# Patient Record
Sex: FEMALE | Race: OTHER | HISPANIC OR LATINO | Employment: FULL TIME | URBAN - METROPOLITAN AREA
[De-identification: names, ages, dates, MRNs, and addresses within clinical notes are randomized per-mention and may not be internally consistent; named-entity substitution may affect disease eponyms.]

---

## 2017-04-05 ENCOUNTER — ALLSCRIPTS OFFICE VISIT (OUTPATIENT)
Dept: OTHER | Facility: OTHER | Age: 32
End: 2017-04-05

## 2017-07-27 ENCOUNTER — ALLSCRIPTS OFFICE VISIT (OUTPATIENT)
Dept: OTHER | Facility: OTHER | Age: 32
End: 2017-07-27

## 2017-07-27 DIAGNOSIS — Z11.3 ENCOUNTER FOR SCREENING FOR INFECTIONS WITH PREDOMINANTLY SEXUAL MODE OF TRANSMISSION: ICD-10-CM

## 2017-08-08 ENCOUNTER — APPOINTMENT (OUTPATIENT)
Dept: LAB | Facility: CLINIC | Age: 32
End: 2017-08-08
Payer: COMMERCIAL

## 2017-08-08 ENCOUNTER — TRANSCRIBE ORDERS (OUTPATIENT)
Dept: LAB | Facility: CLINIC | Age: 32
End: 2017-08-08

## 2017-08-08 DIAGNOSIS — Z11.3 SCREENING EXAMINATION FOR VENEREAL DISEASE: ICD-10-CM

## 2017-08-08 DIAGNOSIS — Z11.3 SCREENING EXAMINATION FOR VENEREAL DISEASE: Primary | ICD-10-CM

## 2017-08-08 DIAGNOSIS — Z11.3 ENCOUNTER FOR SCREENING FOR INFECTIONS WITH PREDOMINANTLY SEXUAL MODE OF TRANSMISSION: ICD-10-CM

## 2017-08-08 LAB — HBV SURFACE AB SER-ACNC: 260.58 MIU/ML

## 2017-08-08 PROCEDURE — 36415 COLL VENOUS BLD VENIPUNCTURE: CPT

## 2017-08-08 PROCEDURE — 86694 HERPES SIMPLEX NES ANTBDY: CPT

## 2017-08-08 PROCEDURE — 87591 N.GONORRHOEAE DNA AMP PROB: CPT

## 2017-08-08 PROCEDURE — 86592 SYPHILIS TEST NON-TREP QUAL: CPT

## 2017-08-08 PROCEDURE — 87389 HIV-1 AG W/HIV-1&-2 AB AG IA: CPT

## 2017-08-08 PROCEDURE — 86706 HEP B SURFACE ANTIBODY: CPT

## 2017-08-08 PROCEDURE — 87491 CHLMYD TRACH DNA AMP PROBE: CPT

## 2017-08-09 LAB
CHLAMYDIA DNA CVX QL NAA+PROBE: ABNORMAL
HIV 1+2 AB+HIV1 P24 AG SERPL QL IA: NORMAL
HSV1+2 IGM SER IA-ACNC: <0.91 RATIO (ref 0–0.9)
N GONORRHOEA DNA GENITAL QL NAA+PROBE: ABNORMAL
RPR SER QL: NORMAL

## 2017-08-11 ENCOUNTER — GENERIC CONVERSION - ENCOUNTER (OUTPATIENT)
Dept: OTHER | Facility: OTHER | Age: 32
End: 2017-08-11

## 2018-01-11 NOTE — RESULT NOTES
Verified Results  (1) THIN PREP PAP WITH IMAGING [id number] 12:00AM Hilary Issa 350     Test Name Result Flag Reference   LAB AP CASE REPORT (Report)     Gynecologic Cytology Report            Case: [id number]                  Authorizing Provider: Leslie Feliciano MD    Collected:      08/22/2016           First Screen:     MICHAEL Kingsley    Received:      08/24/2016 1105        Rescreen:       MICHAEL Vivas                             Specimen:  LIQUID-BASED PAP, SCREENING, Endocervical   HPV HIGH RISK RESULT (Report)     HPV, High Risk: HPV NEG, HPV16 NEG, HPV18 NEG      Other High Risk HPV Negative, HPV 16 Negative, HPV 18 Negative  HPV types: 16,18,31,33,35,39,45,51,52,56,58,59,66 and 68 DNA are undetectable or below the pre-set threshold  Roche?s FDA approved Hiren 4800 is utilized with strict adherence to the ?s instruction  manual to test for the presence of High-Risk HPV DNA, as well as HPV 16 and HPV 18  This instrument  has been validated by our laboratory and/or by the   A negative result does not preclude the presence of HPV infection because results depend on adequate  specimen collection, absence of inhibitors and sufficient DNA to be detected  Additionally, HPV negative  results are not intended to prevent women from proceeding to colposcopy if clinically warranted  Positive HPV test results indicate the presence of any one or more of the high risk types, but since patients  are often co-infected with low-risk types it does not rule out the presence of low-risk types in patients  with mixed infections  LAB AP GYN PRIMARY INTERPRETATION      Negative for intraepithelial lesion or malignancy  Electronically signed by MICHAEL Vivas on 9/2/2016 at 11:19 AM   LAB AP GYN SPECIMEN ADEQUACY      Satisfactory for evaluation  Endocervical/transformation zone component present     LAB AP GYN ADDITIONAL INFORMATION (Report)     [employer] FDA approved ,  and ThinPrep Imaging System are   utilized with strict adherence to the 's instruction manual to   prepare gynecologic and non-gynecologic cytology specimens for the   production of ThinPrep slides as well as for gynecologic ThinPrep imaging  These processes have been validated by our laboratory and/or by the     The Pap test is not a diagnostic procedure and should not be used as the   sole means to detect cervical cancer  It is only a screening procedure to   aid in the detection of cervical cancer and its precursors  Both   false-negative and false-positive results have been experienced  Your   patient's test result should be interpreted in this context together with   the history and clinical findings         Signatures   Electronically signed by : JOCY Godoy ; Sep 12 2016  9:39AM EST                       (Author)

## 2018-01-11 NOTE — RESULT NOTES
Discussion/Summary   SUMMARY OF TEST RESULTS:      --POSITIVE chlamydia test   May or may not cause symptoms in women (vaginal burning, discharge), but as a precaution we should go ahead and treat with antibiotic (one time dose; Rx sent to pharmacy)  --Other tests negative  Hepatitis B test shows that you have immunity (presumably you were immunized in the past)  Let me know if you have any questions--Daniel      Verified Results  (1) CHLAMYDIA/GC AMPLIFIED DNA, PCR 08Aug2017 10:16AM Zeke Fury     Test Name Result Flag Reference   CHLAMYDIA,AMPLIFIED DNA PROBE  A C  trachomatis Amplified DNA Negative   C  trachomatis Amplified DNA POSITIVE   For optimal microbe detection, urine samples should be a first catch specimen (20-60 ml of urine)  Patient should not have urinated for at least 1 hour prior to collection  A specimen not collected in this manner may have falsely negative results  Calos Prado AMPLIFIED DNA   N  gonorrhoeae Amplified DNA Negative   N  gonorrhoeae Amplified DNA Negative     (1) RPR 60Rgj8223 10:15AM Zeke Fury    Order Number: [id number]     Test Name Result Flag Reference   RPR Non-Reactive  Non-Reactive     (1) HIV AG/AB Eulice Saas GEN [id number] 10:15AM Zeke Fury TW Order Number: [id number]     Test Name Result Flag Reference   HIV 1/2 AND P24 Non-Reactive  Non-Reactive   This test detects HIV 1, HIV2 and p24 Antigen       (1) HEP B SURFACE ANTIBODY 44Tdq3741 10:15AM Zeke Fury TW Order Number: [id number]     Test Name Result Flag Reference   HEPATITIS B SURFACE ANTIBODY 260 58 mIU/mL     Protective Immunity: Hep B Surface Antibody >= 10 mIu/ml (Traceable to [facility] Reference Preparation)     (1) HERPES I/II ANTIBODIES, IGM 97Pjr0036 10:15AM Zeke Fury     Test Name Result Flag Reference   HSVI/II COMB AB IGM <0 91 Ratio  0 00 - 0 90   Negative        <0 91                                   Equivocal 0 91 - 1 09 Positive        >1 09  Performed at:  [address]  [id number]  : Lauren Mendez MD, Phone:  [phone]       Plan  Chlamydia Trachomatis Urethritis    · Azithromycin 500 MG Oral Tablet; TAKE 2 TABLETS X 1   TAKE WITH FOOD

## 2018-01-13 VITALS
HEIGHT: 65 IN | HEART RATE: 85 BPM | BODY MASS INDEX: 30.82 KG/M2 | DIASTOLIC BLOOD PRESSURE: 70 MMHG | SYSTOLIC BLOOD PRESSURE: 128 MMHG | WEIGHT: 185 LBS | TEMPERATURE: 98.3 F | OXYGEN SATURATION: 98 %

## 2018-01-14 VITALS
HEIGHT: 65 IN | BODY MASS INDEX: 32.56 KG/M2 | DIASTOLIC BLOOD PRESSURE: 84 MMHG | SYSTOLIC BLOOD PRESSURE: 132 MMHG | WEIGHT: 195.44 LBS

## 2018-01-18 NOTE — PROGRESS NOTES
Assessment    1  Well adult exam (V70 0) (Z00 00)   2  Chronic migraine without aura without status migrainosus, not intractable (346 70)   (G43 709)   3  Carpal tunnel syndrome, bilateral (354 0) (G56 03)   4  Obesity (278 00) (E66 9)   5  Routine screening for STI (sexually transmitted infection) (V74 5) (Z11 3)   6  Bunion, right foot (727 1) (M21 611)   7  Mild depression (311) (F32 0)    Plan  Bunion, right foot    · 2 - Corey Giles DPM  (Podiatry) Co-Management  *  Status: Active  Requested for:  54ZKJ0949  Care Summary provided  : Yes  Routine screening for STI (sexually transmitted infection)    · (1) HEP B SURFACE ANTIBODY; Status:Active; Requested for:38Cgb8766;    · (1) HIV AG/AB COMBO, 4TH GEN; [Do Not Release]; Status:Active; Requested  for:67Acb5013;    · (1) RPR; Status:Active; Requested for:86Pqc3064;    · CHLAMYDIA/N  GONORRHEAE RNA; Status:Active; Requested for:15God2833;    · HSV 1/2 AB (IGM), IFA W/RFL TO TITER, SERUM; Status:Active; Requested  for:84Cjl6047;   Screening for depression    · *VB-Depression Screening; Status:Complete;   Done: 01CIW2631 06:56PM  Well adult exam    · Call [phone] if: You find a new or different kind of lump in your breast ;  Status:Complete;   Done: 97EIX0533   · Call [phone] if: You have any warning signs of skin cancer ; Status:Complete;    Done: 53OMZ9238   · Always use a seat belt and shoulder strap when riding or driving a motor vehicle ;  Status:Complete;   Done: 35WMZ6449   · Begin or continue regular aerobic exercise   Gradually work up to at least 3 sessions of 30  minutes of exercise a week ; Status:Complete;   Done: 69QQG2169   · Brush your teeth 3 times a day and floss at least once a day ; Status:Complete;   Done:  12TLM2384   · Decreasing the stress in your life may help your condition improve ; Status:Complete;    Done: 73NKR4695   · Limit your use of alcohol to 2 drinks or cans of beer a day ; Status:Complete;   Done:  69DIH4445   · Stretch and warm up your muscles during the first 10 minutes , then cool down your  muscles for the last 10 minutes of exercise ; Status:Complete;   Done: 99LCJ4133   · There are many ways to reduce your risk of catching or spreading a sexually transmitted  Infection ; Status:Complete;   Done: 48EXB7459   · Vitamins can help you get daily requirements that your diet may not be giving you ;  Status:Complete;   Done: 76TZA6212   · We recommend routine visits to a dentist ; Status:Complete;   Done: 05NQF0758   · We recommend that you bring your body mass index down to 26 ; Status:Complete;    Done: 69RGY8571   · We recommend that you follow the "Mediterranean diet "; Status:Complete;   Done:  75SLH9503   · You need to stop smoking  Though it is not easy, more than half of all adult smokers  have quit  We encourage you to write down all the reasons you should quit smoking and  set a quit date for yourself  Ask us how we can help  You may also call  [facility] for free resources and assistance ; Status:Complete;   Done:  06MRT0082    Discussion/Summary  health maintenance visit Currently, she eats an adequate diet and has an adequate exercise regimen  cervical cancer screening is managed by GYN Breast cancer screening: monthly self breast exam was advised and breast cancer screening is managed by GYN  Colorectal cancer screening: colorectal cancer screening is not indicated  Osteoporosis screening: bone mineral density testing is not indicated  The immunizations are up to date  Advice and education were given regarding nutrition, aerobic exercise and weight loss  Preventative + obesity: Applauded for her recent weight loss efforts  Encouraged to continue with this  Normal lipids, A1C, TSH in the past 2 years  UTD with GYN care  UTD with Tdap  Slips given for STI testing per patient request--asymptomatic  Migraines: Controlled on Topamax  Advil, Tylenol prn  Followed by neurology       Mild depression: Attributes to her recent separation from her   Moods improving  Good support system, declines need for additional intervention at this time  Regular exercise encouraged  Right bunion: Podiatry referral  Continued weight loss measures encouraged  Bilateral CTS (mild): Continue Advil, other conservative therapy  Call if becomes more bothersome-->ortho hand  RTO 1 year  Chief Complaint  Pt here for annual physical exam      History of Present Illness  HM, Adult Female: The patient is being seen for a health maintenance evaluation  The last health maintenance visit was 1 year(s) ago  Social History: Household members include 1 son(s), mother and father  She is   Work status: working full time  The patient has never smoked cigarettes  She reports rare alcohol use  She has never used illicit drugs  General Health: The patient's health since the last visit is described as good  She has regular dental visits  She denies vision problems  She denies hearing loss  Immunizations status: up to date  Lifestyle:  She consumes a diverse and healthy diet  She has weight concerns  She exercises regularly  She does not use tobacco  She denies alcohol use  She denies drug use  Reproductive health: the patient is premenopausal   she reports abnormal menses  Menstrual history: Menstrual Problems: dysmenorrhea  she is sexually active  Screening: cancer screening reviewed and updated  metabolic screening reviewed and updated  risk screening reviewed and updated  HPI:   Here for routine well exam      Migraines improved on Topamax  Once a week on average  Advil, Tylenol helps   from  2-3 months ago because he was cheating on her  Mildly depressed initially, but doing better now  Would like to get full STI testing done just in case  No symptoms  Currently living with her parents, 3year old son  Working full time at [employer]  Fairly physically demanding job   Ongoing mild CTS symptoms, both hands  Right great toe more prominent, painful over the past 2 months  No injury  On her feet a lot at work  Tries to wear comfortable shoes  Tries to eat healthy  Has cut down on her portion size  Tdap 2014 (at time of miscarriage)  Review of Systems    Constitutional: recent 10 lb weight loss, but no fever and not feeling tired  Eyes: no eyesight problems  ENT: no sore throat and no hearing loss  Cardiovascular: no palpitations  Respiratory: no shortness of breath and no cough  Gastrointestinal: no abdominal pain, no nausea, no vomiting, no constipation, no diarrhea and no blood in stools  Genitourinary: no dysuria and no incontinence  Musculoskeletal: as noted in HPI  Integumentary: no rashes  Neurological: as noted in HPI  Psychiatric: as noted in HPI and no sleep disturbances  Hematologic/Lymphatic: no swollen glands  Over the past 2 weeks, how often have you been bothered by the following problems? 1 ) Little interest or pleasure in doing things? Several days  2 ) Feeling down, depressed or hopeless? Several days  3 ) Trouble falling asleep or sleeping too much? Half the days or more  4 ) Feeling tired or having little energy? Nearly every day  5 ) Poor appetite or overeating? Half the days or more  6 ) Feeling bad about yourself, or that you are a failure, or have let yourself or your family down? Not at all    7 ) Trouble concentrating on things, such as reading a newspaper or watching television? Several days  8 ) Moving or speaking so slowly that other people could have noticed, or the opposite, moving or speaking faster than usual? Not at all    9 ) Thoughts that you would be better off dead or of hurting yourself in some way? Not at all  Score 10      Active Problems    1  Attempting to conceive (V49 89) (Z78 9)   2  History of Candidal intertrigo (112 3) (B37 2)   3  Carpal tunnel syndrome, bilateral (354 0) (G56 03)   4  Chlamydia Trachomatis Urethritis (099 41)   5  Chronic migraine without aura without status migrainosus, not intractable (346 70)   (G43 709)   6  Contraception (V25 9) (Z30 9)   7  Dyspareunia (625 0)   8  Female pelvic pain (625 9) (R10 2)   9  Flu vaccine need (V04 81) (Z23)   10  Laboratory examination ordered as part of a routine general medical examination    (V72 62) (Z00 00)   11  History of Mid back pain (724 5) (M54 9)   12  Obesity (278 00) (E66 9)   13  Oligomenorrhea (626 1) (N91 5)   14  Pregnancy with threatened  (640 00) (O20 0)   15  Screening examination for sexually transmitted disease (V74 5) (Z11 3)   16  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)   17  Spontaneous  (634 9)   18  Vaginal discharge (623 5) (N89 8)   19   Well female exam with routine gynecological exam (V72 31) (Z01 419)    Past Medical History    · History of Age At First Period 15 Years Old (Menarche)   · Bunion, right foot (727 1) (M21 611)   · History of Candidal intertrigo (112 3) (B37 2)   · History of vitamin D deficiency (V12 1) (Z86 39)   · Laboratory examination ordered as part of a routine general medical examination  (V72 62) (Z00 00)   · History of Mid back pain (724 5) (M54 9)   · Mild depression (311) (F32 0)   · Obesity (278 00) (E66 9)   · History of Previous Spontaneous Vaginal Delivery   · Routine screening for STI (sexually transmitted infection) (V74 5) (Z11 3)   · History of Urinary Tract Infection (V13 02)   · Well adult exam (V70 0) (Z00 00)    Surgical History    · Denied: History Of Prior Surgery    Family History  Father    · Family history of gout (V18 19) (Z82 69)   · Family history of hypertension (V17 49) (Z82 49)  Brother    · Family history of Leukemia (V16 6)  Maternal Grandfather    · Family history of diabetes mellitus (V18 0) (Z83 3)  Family History    · Family history of Alzheimer's disease (V17 2) (Z82 0)   · Family history of anemia (V18 2) (Z83 2)   · Family history of migraine headaches (V17 2) (Z82 0)   · Family history of Vitamin D deficiency    Social History    · Denied: History of Alcohol Use (History)   · Attempting to conceive (V49 89) (Z78 9)   · Denied: History of Drug Use   ·    · Never A Smoker   · One child   · Unemployed (V62 0) (Z56 0)    Current Meds   1  Advil TABS; PRN; Therapy: (Recorded:05Apr2017) to Recorded   2  B-6 50 MG Oral Tablet; TAKE 1 TABLET DAILY; Therapy: 57KGQ5490 to (Evaluate:63Yvy5902)  Requested for: 45ITO8836; Last   Rx:16Nov2016 Ordered   3  Cyproheptadine HCl - 4 MG Oral Tablet; TAKE 1 TABLET AT BEDTIME; Therapy: 60VHH0484 to (Evaluate:11Nov2017)  Requested for: 48BCE7758; Last   Rx:16Nov2016 Ordered   4  Topiramate 25 MG Oral Tablet; Take 1 tab at bedtime for 1 week then 2 tabs at bedtime   for 1 week then 3 tabs at bedtime for 1 week and then 4 tabs at bedtime; Therapy: 77FZZ0302 to (Evaluate:61Mcv1367)  Requested for: 05Apr2017; Last   Rx:05Apr2017 Ordered   5  Tylenol CAPS; PRN; Therapy: (Recorded:05Apr2017) to Recorded    Allergies    1  Milk-related Compounds    Vitals   Recorded: 89Cdx8788 06:50PM   Temperature 98 3 F, Tympanic   Heart Rate 85   Systolic 848   Diastolic 70   Height 5 ft 5 in   Weight 185 lb    BMI Calculated 30 79   BSA Calculated 1 91   O2 Saturation 98     Physical Exam    Constitutional   General appearance: No acute distress, well appearing and well nourished  Head and Face   Head and face: Normal     Eyes   Conjunctiva and lids: No swelling, erythema or discharge  Pupils and irises: Equal, round, reactive to light  Ears, Nose, Mouth, and Throat   External inspection of ears and nose: Normal     Otoscopic examination: Tympanic membranes translucent with normal light reflex  Canals patent without erythema  Nasal mucosa, septum, and turbinates: Normal without edema or erythema  Lips, teeth, and gums: Normal, good dentition      Oropharynx: Normal with no erythema, edema, exudate or lesions  Neck   Neck: Supple, symmetric, trachea midline, no masses  Thyroid: Normal, no thyromegaly  Pulmonary   Respiratory effort: No increased work of breathing or signs of respiratory distress  Auscultation of lungs: Clear to auscultation  Cardiovascular   Auscultation of heart: Normal rate and rhythm, normal S1 and S2, no murmurs  Abdomen   Abdomen: Non-tender, no masses  Liver and spleen: No hepatomegaly or splenomegaly  Lymphatic   Palpation of lymph nodes in neck: No lymphadenopathy  Musculoskeletal   Gait and station: Normal     Joints, bones, and muscles: Abnormal   Mild medial prominence + tenderness of right 1st MTP joint  No erythema, swelling  Muscle strength/tone: Normal     Skin   Skin and subcutaneous tissue: Normal without rashes or lesions  Neurologic   Reflexes: 2+ and symmetric  Psychiatric   Orientation to person, place, and time: Normal     Mood and affect: Normal        Results/Data  *VB-Depression Screening 19Dxs6748 06:56PM Cameron Prisca     Test Name Result Flag Reference   Depression Scale Result      Depression Screen - Positive Findings       Health Management  Well female exam with routine gynecological exam   Health Maintenance Flow Sheet; every 5 years; Last 06ZNV6226; Next Due: 30KCZ0785;   Overdue    Future Appointments    Date/Time Provider Specialty Site   08/17/2017 09:30 AM Ana Maria Galdamez MD Neurology  [address] [facility]     Signatures   Electronically signed by : Faizan Araiza, [address] [place] [address]; Jul 27 2017  7:48PM EST                       (Author)    Electronically signed by : JOCY Hercules ; Jul 28 2017 11:40AM EST

## 2018-04-13 RX ORDER — CYPROHEPTADINE HYDROCHLORIDE 4 MG/1
1 TABLET ORAL
COMMUNITY
Start: 2016-11-16 | End: 2018-04-17 | Stop reason: ALTCHOICE

## 2018-04-13 RX ORDER — TOPIRAMATE 25 MG/1
TABLET ORAL
COMMUNITY
Start: 2017-04-05 | End: 2018-04-17 | Stop reason: ALTCHOICE

## 2018-04-13 RX ORDER — IBUPROFEN 200 MG
TABLET ORAL AS NEEDED
COMMUNITY
End: 2018-08-21

## 2018-04-13 RX ORDER — PYRIDOXINE HCL (VITAMIN B6) 50 MG
1 TABLET ORAL DAILY
COMMUNITY
Start: 2016-11-16 | End: 2018-08-21 | Stop reason: ALTCHOICE

## 2018-04-17 ENCOUNTER — OFFICE VISIT (OUTPATIENT)
Dept: NEUROLOGY | Facility: CLINIC | Age: 33
End: 2018-04-17
Payer: COMMERCIAL

## 2018-04-17 VITALS
DIASTOLIC BLOOD PRESSURE: 100 MMHG | SYSTOLIC BLOOD PRESSURE: 160 MMHG | WEIGHT: 193 LBS | HEART RATE: 88 BPM | BODY MASS INDEX: 32.15 KG/M2 | HEIGHT: 65 IN

## 2018-04-17 DIAGNOSIS — G43.711 INTRACTABLE CHRONIC MIGRAINE WITHOUT AURA AND WITH STATUS MIGRAINOSUS: Primary | ICD-10-CM

## 2018-04-17 PROCEDURE — 99214 OFFICE O/P EST MOD 30 MIN: CPT | Performed by: PSYCHIATRY & NEUROLOGY

## 2018-04-17 PROCEDURE — 96372 THER/PROPH/DIAG INJ SC/IM: CPT | Performed by: PSYCHIATRY & NEUROLOGY

## 2018-04-17 RX ORDER — PROCHLORPERAZINE MALEATE 10 MG
TABLET ORAL
Qty: 10 TABLET | Refills: 0 | Status: SHIPPED | OUTPATIENT
Start: 2018-04-17 | End: 2018-08-21 | Stop reason: ALTCHOICE

## 2018-04-17 RX ORDER — KETOROLAC TROMETHAMINE 30 MG/ML
60 INJECTION, SOLUTION INTRAMUSCULAR; INTRAVENOUS ONCE
Status: COMPLETED | OUTPATIENT
Start: 2018-04-17 | End: 2018-04-17

## 2018-04-17 RX ORDER — KETOROLAC TROMETHAMINE 10 MG/1
TABLET, FILM COATED ORAL
Qty: 10 TABLET | Refills: 0 | Status: SHIPPED | OUTPATIENT
Start: 2018-04-17 | End: 2018-08-21 | Stop reason: ALTCHOICE

## 2018-04-17 RX ORDER — PROPRANOLOL HYDROCHLORIDE 20 MG/1
TABLET ORAL
Qty: 60 TABLET | Refills: 3 | Status: SHIPPED | OUTPATIENT
Start: 2018-04-17 | End: 2018-08-21 | Stop reason: ALTCHOICE

## 2018-04-17 RX ADMIN — KETOROLAC TROMETHAMINE 60 MG: 30 INJECTION, SOLUTION INTRAMUSCULAR; INTRAVENOUS at 14:20

## 2018-04-17 NOTE — PROGRESS NOTES
Patient ID: Lee Knox is a 28 y o  female  Assessment/Plan:   Problem List Items Addressed This Visit        Cardiovascular and Mediastinum    Intractable chronic migraine without aura and with status migrainosus - Primary    Relevant Medications    ibuprofen (ADVIL) 200 mg tablet    Acetaminophen (TYLENOL) 325 MG CAPS    propranolol (INDERAL) 20 mg tablet    ketorolac (TORADOL) 10 mg tablet    prochlorperazine (COMPAZINE) 10 mg tablet    ketorolac (TORADOL) 60 mg/2 mL IM injection 60 mg (Start on 4/17/2018  2:00 PM)    prochlorperazine edisylate (COMPAZINE) injection 10 mg (Start on 4/17/2018  2:00 PM)            preventive therapy for migraine headaches:  -  I will start her on propanolol 20 mg twice a day   abortive therapy for migraine headaches  -  At the onset of a migraine headache she is to take Toradol  ( ketorolac ) 10 mg  And Compazine (  prochlorperazine) 10 mg      hyper pressure was elevated today  We will see if blood pressure continues to stay elevated with propanolol  We will see patient back in 2 months    Subjective:  HPI  We had the pleasure of evaluating Lee Knox in neurological follow-up today  As you know she is a 32year old right handed female  She has been working at an [employer]  Carpal tunnel syndrome:   - At her last appt she had been experiencing paresthesias in the bilat hands in the median nerve distribution for the past 2 weeks  They would awaken her at night and occur when she is using her phone or driving  She started B6 and the symptoms resolved  Chronic Migraine headaches:   PREVENTIVE: magnesium, B2, cyproheptadine  ABORTIVE: Advil, Tylenol    - Headaches are worse if the patient: putting hair in a tight band  - Headache trigger: stress, Weather change - heat    Aura: None     current headache is 7-8/10  How often do the headaches occur - 2 to 3 times a week  This week she had almost daily headaches    She says that they were really severe headaches  She states that even taking Motrin was not helping  She also noted that with this she had severe neck pain  Which started on the right side and then involved the whole neck  What time of the day do the headaches start - can happen at any time  How long do the headaches last - a few hours  Where are they located - bilateral temporal, frontal, occipital, top of head  What is the intensity of pain - 3-5/10  Describe your usual headache - throbbing, pulsing, achy, stabbing, pressure  Associated symptoms: photophobia, blurred vision, phonophobia, sensitive to smells, Problem with concentration, lacrimation, prefer to be alone and in a dark room    Reviewed ROS  The following portions of the patient's history were reviewed and updated as appropriate: allergies, current medications, past family history, past medical history, past social history, past surgical history and problem list        Objective:    Blood pressure 160/100, pulse 88, height 5' 5" (1 651 m), weight 87 5 kg (193 lb)  Physical Exam   Constitutional: She appears well-developed  Eyes: EOM are normal  Pupils are equal, round, and reactive to light  Neck: Normal range of motion  Neck supple  Cardiovascular: Normal rate  Pulmonary/Chest: Effort normal    Abdominal: Soft  Musculoskeletal: Normal range of motion  Neurological: She has normal strength and normal reflexes  Gait and coordination normal    Skin: Skin is warm  Psychiatric: She has a normal mood and affect  Her speech is normal        Neurological Exam    Mental Status  The patient is alert  Her speech is normal  Her language is fluent with no aphasia  She has normal attention span and concentration       Cranial Nerves    CN II: The patient's visual acuity and visual fields are normal   CN III, IV, VI: The patient's pupils are equally round and reactive to light and ocular movements are normal   CN V: The patient has normal facial sensation  CN VII:  The patient has symmetric facial movement  CN VIII:  The patient's hearing is normal   CN IX, X: The patient has symmetric palate movement and normal gag reflex  CN XI: The patient's shoulder shrug strength is normal   CN XII: The patient's tongue is midline without atrophy or fasciculations  Motor  The patient has normal muscle bulk throughout  Her overall muscle tone is normal throughout  Her strength is 5/5 throughout all four extremities  Sensory  The patient's sensation is normal in all four extremities  Reflexes  Deep tendon reflexes are 2+ and symmetric in all four extremities with downgoing toes bilaterally  Gait and Coordination  The patient has normal gait and station and normal casual, toe, heel, and tandem gait  She has normal coordination bilaterally  ROS:  Review of Systems   Constitutional: Negative  HENT: Positive for ear pain  Eyes: Positive for photophobia and pain  Respiratory: Negative  Cardiovascular: Negative  Gastrointestinal: Negative  Endocrine: Negative  Genitourinary: Negative  Musculoskeletal: Positive for neck pain  Skin: Negative  Allergic/Immunologic: Negative  Neurological: Positive for dizziness, light-headedness and headaches  Hematological: Negative  Psychiatric/Behavioral: Positive for sleep disturbance

## 2018-05-31 NOTE — PROGRESS NOTES
Patient ID: Adi Steward is a 28 y o  female  Assessment/Plan:    Paresthesia  We will proceed with EMG testing on your upper extremities to evaluate your symptoms  Call us if anything worsens    Chronic migraine without aura without status migrainosus, not intractable  Preventative:  Magnesium 400 mg a day  Vitamin B2 (riboflavin) 400 mg a day  Continue propranolol twice a day    Abortive: At onset of migraine, take rizatriptan  May repeat in 2 hours if needed  Limit of 3/week or 9/month  Limit Tylenol and Ibuprofen to less than 3 times a week         Problem List Items Addressed This Visit        Cardiovascular and Mediastinum    Chronic migraine without aura without status migrainosus, not intractable - Primary     Preventative:  Magnesium 400 mg a day  Vitamin B2 (riboflavin) 400 mg a day  Continue propranolol twice a day    Abortive: At onset of migraine, take rizatriptan  May repeat in 2 hours if needed  Limit of 3/week or 9/month  Limit Tylenol and Ibuprofen to less than 3 times a week         Relevant Medications    rizatriptan (MAXALT) 10 MG tablet       Other    Paresthesia     We will proceed with EMG testing on your upper extremities to evaluate your symptoms  Call us if anything worsens         Relevant Orders    EMG 2 Limb Upper Extremity             Subjective:    HPI  We had the pleasure of evaluating Adi Steward in neurological follow-up today  As you know she is a 32year old right handed female  She has been working at an [employer]          Carpal tunnel syndrome:   - At her last appt she had been experiencing paresthesias in the bilat hands in the median nerve distribution for the past 2 weeks  They would awaken her at night and occur when she is using her phone or driving  Was better but now worse  Complains of numbness in thumb, and first 2 fingers    Also has numbness in both hands after sleeping and sometimes even just with arm extended     Chronic Migraine headaches: PREVENTIVE: magnesium, B2, cyproheptadine, Indural  ABORTIVE: Advil, Tylenol  Headaches are worse if the patient: putting hair in a tight band  Headache trigger: stress, Weather change - heat     Aura: None  current headache is 2/10  How often do the headaches occur - 2 times a week  What time of the day do the headaches start - can happen at any time  How long do the headaches last - a few hours (3-4 hours), occasionally can last into next day  Where are they located - bilateral temporal, frontal, occipital, top of head  What is the intensity of pain - 6-10/10 (average 5-6/10)  Describe your usual headache - throbbing, pulsing, achy, stabbing, pressure  Associated symptoms: photophobia, blurred vision, phonophobia, sensitive to smells, Problem with concentration, lacrimation, prefer to be alone and in a dark room  Not currently pregnant, however may have more children in future     The following portions of the patient's history were reviewed and updated as appropriate: allergies, current medications, past family history, past medical history, past social history, past surgical history and problem list          Objective:    Blood pressure 146/90, pulse 74, height 5' 5" (1 651 m), weight 89 kg (196 lb 1 6 oz)  Physical Exam   Constitutional: She appears well-developed and well-nourished  HENT:   Head: Normocephalic and atraumatic  Eyes: EOM are normal  Pupils are equal, round, and reactive to light  Neck: Normal range of motion  Cardiovascular: Normal rate  Pulmonary/Chest: Effort normal    Musculoskeletal: Normal range of motion  Neurological: She has normal strength and normal reflexes  Gait and coordination normal    Skin: Skin is warm and dry  Psychiatric: She has a normal mood and affect  Her speech is normal    Nursing note and vitals reviewed  Neurological Exam    Mental Status  The patient is alert and oriented to person, place, time, and situation   Her recent and remote memory are normal  She has no visuospatial neglect  Her speech is normal  Her language is fluent with no aphasia  She has normal attention span and concentration  She has a normal fund of knowledge  Cranial Nerves    CN II: The patient's visual acuity and visual fields are normal   CN III, IV, VI: The patient's pupils are equally round and reactive to light and ocular movements are normal   CN V: The patient has normal facial sensation  CN VII:  The patient has symmetric facial movement  CN VIII:  The patient's hearing is normal   CN IX, X: The patient has symmetric palate movement and normal gag reflex  CN XI: The patient's shoulder shrug strength is normal   CN XII: The patient's tongue is midline without atrophy or fasciculations  Motor  The patient has normal muscle bulk throughout  Her overall muscle tone is normal throughout  Her strength is 5/5 throughout all four extremities  Sensory  The patient's sensation is normal in all four extremities  She has normal cortical sensation    Reflexes  Deep tendon reflexes are 2+ and symmetric in all four extremities with downgoing toes bilaterally  Gait and Coordination  The patient has normal gait and station and normal casual, toe, heel, and tandem gait  She has normal coordination bilaterally  ROS:    Review of Systems   Constitutional: Positive for chills and fatigue  HENT: Negative  Eyes: Negative  Respiratory: Negative  Cardiovascular: Negative  Gastrointestinal: Negative  Endocrine: Negative  Genitourinary: Negative  Musculoskeletal: Positive for neck pain  Skin: Negative  Allergic/Immunologic: Negative  Neurological: Positive for dizziness, light-headedness, numbness (b/l hands ) and headaches  Hematological: Negative  Psychiatric/Behavioral: Positive for sleep disturbance

## 2018-06-05 ENCOUNTER — OFFICE VISIT (OUTPATIENT)
Dept: NEUROLOGY | Facility: CLINIC | Age: 33
End: 2018-06-05
Payer: COMMERCIAL

## 2018-06-05 ENCOUNTER — OFFICE VISIT (OUTPATIENT)
Dept: FAMILY MEDICINE CLINIC | Facility: OTHER | Age: 33
End: 2018-06-05
Payer: COMMERCIAL

## 2018-06-05 VITALS
SYSTOLIC BLOOD PRESSURE: 146 MMHG | HEART RATE: 74 BPM | DIASTOLIC BLOOD PRESSURE: 90 MMHG | BODY MASS INDEX: 32.67 KG/M2 | HEIGHT: 65 IN | WEIGHT: 196.1 LBS

## 2018-06-05 VITALS
WEIGHT: 195.1 LBS | HEART RATE: 80 BPM | HEIGHT: 66 IN | TEMPERATURE: 98.1 F | BODY MASS INDEX: 31.36 KG/M2 | DIASTOLIC BLOOD PRESSURE: 96 MMHG | SYSTOLIC BLOOD PRESSURE: 152 MMHG | OXYGEN SATURATION: 99 %

## 2018-06-05 DIAGNOSIS — I10 ESSENTIAL HYPERTENSION: Primary | ICD-10-CM

## 2018-06-05 DIAGNOSIS — R20.2 PARESTHESIA: ICD-10-CM

## 2018-06-05 DIAGNOSIS — E66.9 CLASS 1 OBESITY WITH BODY MASS INDEX (BMI) OF 31.0 TO 31.9 IN ADULT, UNSPECIFIED OBESITY TYPE, UNSPECIFIED WHETHER SERIOUS COMORBIDITY PRESENT: ICD-10-CM

## 2018-06-05 DIAGNOSIS — B37.0 ORAL CANDIDIASIS: ICD-10-CM

## 2018-06-05 DIAGNOSIS — G43.709 CHRONIC MIGRAINE WITHOUT AURA WITHOUT STATUS MIGRAINOSUS, NOT INTRACTABLE: Primary | ICD-10-CM

## 2018-06-05 DIAGNOSIS — G43.709 CHRONIC MIGRAINE WITHOUT AURA WITHOUT STATUS MIGRAINOSUS, NOT INTRACTABLE: ICD-10-CM

## 2018-06-05 DIAGNOSIS — G47.30 SLEEP-DISORDERED BREATHING: ICD-10-CM

## 2018-06-05 DIAGNOSIS — R35.89 POLYURIA: ICD-10-CM

## 2018-06-05 DIAGNOSIS — G56.03 CARPAL TUNNEL SYNDROME, BILATERAL: ICD-10-CM

## 2018-06-05 PROCEDURE — 99214 OFFICE O/P EST MOD 30 MIN: CPT | Performed by: PHYSICIAN ASSISTANT

## 2018-06-05 PROCEDURE — 99214 OFFICE O/P EST MOD 30 MIN: CPT | Performed by: NURSE PRACTITIONER

## 2018-06-05 RX ORDER — RIZATRIPTAN BENZOATE 10 MG/1
10 TABLET ORAL ONCE AS NEEDED
Qty: 9 TABLET | Refills: 0 | Status: SHIPPED | OUTPATIENT
Start: 2018-06-05 | End: 2018-08-21 | Stop reason: ALTCHOICE

## 2018-06-05 NOTE — PATIENT INSTRUCTIONS
Preventative:  Magnesium 400 mg a day  Vitamin B2 (riboflavin) 400 mg a day  Continue propranolol twice a day    Abortive: At onset of migraine, take rizatriptan  May repeat in 2 hours if needed  Limit of 3/week or 9/month  Limit Tylenol and Ibuprofen to less than 3 times a week    We will proceed with EMG testing on your upper extremities to evaluate your symptoms  Call us if anything worsens    May take these over-the-counter supplements to decrease intensity and frequency of migraines  - Magnesium Oxide 400-500 mg a day  If any diarrhea or upset stomach, decrease dose  as tolerated  -  B2 200 mg a day  May take once a day in am  This supplement will change the color of the urine to fluorescent yellow no matter how hydrated, which is normal      Discussed side effects of all medications prescribed today to the patient in detail  Patient education was completed today and we also discussed precautions for rebound headaches  When patient has a moderate to severe headache, they should seek rest, initiate relaxation and apply cold compresses to the head  1  Maintain regular sleep schedule  Adults need at least 7-8 hours of uninterrupted a night  2  Limit over the counter medications such as Tylenol, Ibuprofen, Aleve, Excedrin  (No more than 3 times a week)  3  Maintain headache diary  We discussed an VLADIMIR for a smart phone is "Migraine eDiary"  4  Limit caffeine to 1-2 cups a day or less  5  Avoid dietary trigger  (list given to the patient and reviewed with them)  6  Patient is to have regular frequent meals to prevent headache onset  7   Please drink at least 64 ounces of water a day to help remain hydrated

## 2018-06-05 NOTE — ASSESSMENT & PLAN NOTE
We will proceed with EMG testing on your upper extremities to evaluate your symptoms  Call us if anything worsens

## 2018-06-05 NOTE — ASSESSMENT & PLAN NOTE
Preventative:  Magnesium 400 mg a day  Vitamin B2 (riboflavin) 400 mg a day  Continue propranolol twice a day    Abortive: At onset of migraine, take rizatriptan  May repeat in 2 hours if needed    Limit of 3/week or 9/month  Limit Tylenol and Ibuprofen to less than 3 times a week

## 2018-06-05 NOTE — PATIENT INSTRUCTIONS

## 2018-06-05 NOTE — PROGRESS NOTES
Assessment/Plan:         Problem List Items Addressed This Visit     Essential hypertension   --Elevated, despite daily compliance with B-blocker, which was recently prescribed by neurologist for her migraines  Potential contributing factors including obesity, stress level, regular NSAID use, possible ANNMARIE  --No recent labs-->will perform secondary workup including sleep study, per below  --Given female of childbearing age, will avoid additional medication including ACEI/ARBs for now  --Weight loss, dietary interventions, home BP readings, stress relieving measures encouraged   Other Relevant Orders   CBC and differential   Comprehensive metabolic panel   HEMOGLOBIN A1C W/ EAG ESTIMATION   Lipid Panel with Direct LDL reflex   TSH, 3rd generation with T4 reflex   Aldosterone/Renin Ratio   Chronic migraine without aura without status migrainosus, not intractable  --Followed by  neurology  --Remains on B-blocker + abortive therapy prn (NSAID, Compazine)  Avoid overuse of NSAIDs  Carpal tunnel syndrome, bilateral    Other Relevant Orders    Ambulatory referral to Physical Therapy    Obesity  --Continued weight loss measures encouraged      Sleep-disordered breathing  --Weight loss measres    Other Relevant Orders    Diagnostic Sleep Study    Polyuria    Other Relevant Orders    Urinalysis with reflex to microscopic    Osmolality    Diagnostic Sleep Study    Osmolality, urine  Hgb A1C      Other Visit Diagnoses     Oral candidiasis        Relevant Medications    nystatin (MYCOSTATIN) 100,000 units/mL suspension            RTO 1 month  Call for new/worsening symptoms in the interim    Subjective:      Patient ID: Sami Teague is a 28 y o  female  Here because of recent elevated blood pressure readings at neurologist, most recently this morning  This is despite taking B-blocker (propranolol) twice a day, as prescribed, since April  Started by neurologist for her migraines    She states she has not missed any doses  May be helping a bit with her headaches  Frequency down to twice a week on average  Takes OTC NSAID often also, most recently two days ago (four [address])  Rates headache 3/10 at present  No associated N/V lately  Occasional dizziness  No vision changes, photophobia, CP, palpitations  BP was previously (last year) normal without medication  No recent dietary changes, other reasons for her blood pressure to be high that she can think of, although admits to ongoing high stress level, mainly attributed to warehouse job at [employer]  Frequent urination the past 2-3 months  3-4 times a night  Every 2 hours during the day also  No polydipsia, but tends to drink a lot (non-caffeinated, no ETOH)  Urine dark, however  No dysuria, odor  No recent labs  Ongoing bilateral CTS  Needs extensive disability form completed  Was told by neurologist that PCP needs to fill out  Not currently undergoing PT  Fairly healthy diet  Regular fruits and vegetables  No added salt  Unrefreshed sleep most days, despite adequate hours in bed  Frequent night time wakening (either to urinate or for no particular reason)  Son states she snores loudly, but not aware of any apnea  No nighttime choking episodes  Headaches sometimes present when she wakes in morning  Tingling, burning sensation in mouth (tongue primarily) x 2-3 weeks  No recent change in mouthwash, toothpaste  No alcohol  1 cup of coffee per day  Father with hypertension  The following portions of the patient's history were reviewed and updated as appropriate: allergies, current medications, past family history, past medical history, past social history, past surgical history and problem list     Review of Systems   Constitutional: Positive for fatigue  Negative for fever and unexpected weight change  HENT: Negative for sore throat and trouble swallowing  Eyes: Negative for visual disturbance  Respiratory: Negative for shortness of breath  Cardiovascular: Negative for chest pain and palpitations  Gastrointestinal: Negative for abdominal pain, diarrhea, nausea and vomiting  Genitourinary: Positive for frequency  Negative for dysuria  Musculoskeletal: Negative for arthralgias  Neurological: Positive for dizziness and headaches  Hematological: Negative for adenopathy  Psychiatric/Behavioral:        Per HPI         Objective:      /98 (BP Location: Left arm, Patient Position: Sitting, Cuff Size: Adult)   Pulse 80   Temp 98 1 °F (36 7 °C) (Tympanic)   Ht 5' 5 5" (1 664 m)   Wt 88 5 kg (195 lb 1 6 oz)   SpO2 99%   BMI 31 97 kg/m²          Physical Exam   Constitutional: She is oriented to person, place, and time  She appears well-developed and well-nourished  HENT:   Head: Normocephalic  Right Ear: External ear normal    Left Ear: External ear normal    Nose: Nose normal    Posterior half of tongue, dorsal aspect, with adherent white exudate  Remainder of oropharynx WNL  Eyes: Conjunctivae are normal  Pupils are equal, round, and reactive to light  Neck: Normal range of motion  Neck supple  No thyromegaly present  Cardiovascular: Normal rate, regular rhythm and normal heart sounds  Pulmonary/Chest: Effort normal and breath sounds normal    Abdominal: Soft  Bowel sounds are normal  There is no tenderness  Musculoskeletal: She exhibits no edema  Lymphadenopathy:     She has no cervical adenopathy  Neurological: She is alert and oriented to person, place, and time  She has normal reflexes  She displays normal reflexes  Skin: Skin is warm and dry  Psychiatric: She has a normal mood and affect   Her behavior is normal  Thought content normal

## 2018-06-12 ENCOUNTER — TELEPHONE (OUTPATIENT)
Dept: FAMILY MEDICINE CLINIC | Facility: OTHER | Age: 33
End: 2018-06-12

## 2018-06-12 ENCOUNTER — TRANSCRIBE ORDERS (OUTPATIENT)
Dept: LAB | Facility: CLINIC | Age: 33
End: 2018-06-12

## 2018-06-12 ENCOUNTER — APPOINTMENT (OUTPATIENT)
Dept: LAB | Facility: CLINIC | Age: 33
End: 2018-06-12
Payer: COMMERCIAL

## 2018-06-12 DIAGNOSIS — R35.89 POLYURIA: Primary | ICD-10-CM

## 2018-06-12 DIAGNOSIS — I10 ESSENTIAL HYPERTENSION: ICD-10-CM

## 2018-06-12 DIAGNOSIS — R35.89 POLYURIA: ICD-10-CM

## 2018-06-12 LAB
ALBUMIN SERPL BCP-MCNC: 3.5 G/DL (ref 3.5–5)
ALP SERPL-CCNC: 81 U/L (ref 46–116)
ALT SERPL W P-5'-P-CCNC: 18 U/L (ref 12–78)
ANION GAP SERPL CALCULATED.3IONS-SCNC: 8 MMOL/L (ref 4–13)
AST SERPL W P-5'-P-CCNC: 16 U/L (ref 5–45)
BACTERIA UR QL AUTO: ABNORMAL /HPF
BASOPHILS # BLD AUTO: 0.02 THOUSANDS/ΜL (ref 0–0.1)
BASOPHILS NFR BLD AUTO: 0 % (ref 0–1)
BILIRUB SERPL-MCNC: 0.6 MG/DL (ref 0.2–1)
BILIRUB UR QL STRIP: NEGATIVE
BUN SERPL-MCNC: 7 MG/DL (ref 5–25)
CALCIUM SERPL-MCNC: 8.2 MG/DL (ref 8.3–10.1)
CHLORIDE SERPL-SCNC: 104 MMOL/L (ref 100–108)
CHOLEST SERPL-MCNC: 164 MG/DL (ref 50–200)
CLARITY UR: CLEAR
CO2 SERPL-SCNC: 26 MMOL/L (ref 21–32)
COLOR UR: YELLOW
CREAT SERPL-MCNC: 0.62 MG/DL (ref 0.6–1.3)
EOSINOPHIL # BLD AUTO: 0.18 THOUSAND/ΜL (ref 0–0.61)
EOSINOPHIL NFR BLD AUTO: 2 % (ref 0–6)
ERYTHROCYTE [DISTWIDTH] IN BLOOD BY AUTOMATED COUNT: 12 % (ref 11.6–15.1)
EST. AVERAGE GLUCOSE BLD GHB EST-MCNC: 100 MG/DL
GFR SERPL CREATININE-BSD FRML MDRD: 120 ML/MIN/1.73SQ M
GLUCOSE P FAST SERPL-MCNC: 89 MG/DL (ref 65–99)
GLUCOSE UR STRIP-MCNC: NEGATIVE MG/DL
HBA1C MFR BLD: 5.1 % (ref 4.2–6.3)
HCT VFR BLD AUTO: 43.4 % (ref 34.8–46.1)
HDLC SERPL-MCNC: 53 MG/DL (ref 40–60)
HGB BLD-MCNC: 14.9 G/DL (ref 11.5–15.4)
HGB UR QL STRIP.AUTO: ABNORMAL
KETONES UR STRIP-MCNC: NEGATIVE MG/DL
LDLC SERPL CALC-MCNC: 85 MG/DL (ref 0–100)
LEUKOCYTE ESTERASE UR QL STRIP: ABNORMAL
LYMPHOCYTES # BLD AUTO: 2.04 THOUSANDS/ΜL (ref 0.6–4.47)
LYMPHOCYTES NFR BLD AUTO: 25 % (ref 14–44)
MCH RBC QN AUTO: 28.3 PG (ref 26.8–34.3)
MCHC RBC AUTO-ENTMCNC: 34.3 G/DL (ref 31.4–37.4)
MCV RBC AUTO: 82 FL (ref 82–98)
MONOCYTES # BLD AUTO: 0.27 THOUSAND/ΜL (ref 0.17–1.22)
MONOCYTES NFR BLD AUTO: 3 % (ref 4–12)
NEUTROPHILS # BLD AUTO: 5.79 THOUSANDS/ΜL (ref 1.85–7.62)
NEUTS SEG NFR BLD AUTO: 70 % (ref 43–75)
NITRITE UR QL STRIP: NEGATIVE
NON-SQ EPI CELLS URNS QL MICRO: ABNORMAL /HPF
OSMOLALITY UR/SERPL-RTO: 284 MMOL/KG (ref 282–298)
OSMOLALITY UR: 679 MMOL/KG
PH UR STRIP.AUTO: 7 [PH] (ref 4.5–8)
PLATELET # BLD AUTO: 263 THOUSANDS/UL (ref 149–390)
PMV BLD AUTO: 9.7 FL (ref 8.9–12.7)
POTASSIUM SERPL-SCNC: 3.7 MMOL/L (ref 3.5–5.3)
PROT SERPL-MCNC: 7.7 G/DL (ref 6.4–8.2)
PROT UR STRIP-MCNC: NEGATIVE MG/DL
RBC # BLD AUTO: 5.27 MILLION/UL (ref 3.81–5.12)
RBC #/AREA URNS AUTO: ABNORMAL /HPF
SODIUM SERPL-SCNC: 138 MMOL/L (ref 136–145)
SP GR UR STRIP.AUTO: 1.01 (ref 1–1.03)
TRIGL SERPL-MCNC: 131 MG/DL
TSH SERPL DL<=0.05 MIU/L-ACNC: 1.33 UIU/ML (ref 0.36–3.74)
UROBILINOGEN UR QL STRIP.AUTO: 0.2 E.U./DL
WBC # BLD AUTO: 8.3 THOUSAND/UL (ref 4.31–10.16)
WBC #/AREA URNS AUTO: ABNORMAL /HPF

## 2018-06-12 PROCEDURE — 80053 COMPREHEN METABOLIC PANEL: CPT

## 2018-06-12 PROCEDURE — 82088 ASSAY OF ALDOSTERONE: CPT

## 2018-06-12 PROCEDURE — 84244 ASSAY OF RENIN: CPT

## 2018-06-12 PROCEDURE — 83935 ASSAY OF URINE OSMOLALITY: CPT | Performed by: NURSE PRACTITIONER

## 2018-06-12 PROCEDURE — 85025 COMPLETE CBC W/AUTO DIFF WBC: CPT

## 2018-06-12 PROCEDURE — 83036 HEMOGLOBIN GLYCOSYLATED A1C: CPT

## 2018-06-12 PROCEDURE — 81001 URINALYSIS AUTO W/SCOPE: CPT

## 2018-06-12 PROCEDURE — 84443 ASSAY THYROID STIM HORMONE: CPT

## 2018-06-12 PROCEDURE — 36415 COLL VENOUS BLD VENIPUNCTURE: CPT

## 2018-06-12 PROCEDURE — 83930 ASSAY OF BLOOD OSMOLALITY: CPT

## 2018-06-12 PROCEDURE — 80061 LIPID PANEL: CPT

## 2018-06-12 NOTE — TELEPHONE ENCOUNTER
I left message for to call back     ----- Message from Ana Client, Marie Barreto sent at 6/12/2018  2:11 PM EDT -----  Can we let Alyssa Saba know that her blood work results came back normal including blood sugar, thyroid, and very good cholesterol numbers    Thanks

## 2018-06-14 LAB — ALDOST SERPL-MCNC: 7.1 NG/DL (ref 0–30)

## 2018-06-19 LAB — RENIN PLAS-CCNC: 2.17 NG/ML/HR (ref 0.17–5.38)

## 2018-07-03 ENCOUNTER — TELEPHONE (OUTPATIENT)
Dept: NEUROLOGY | Facility: CLINIC | Age: 33
End: 2018-07-03

## 2018-07-09 ENCOUNTER — EVALUATION (OUTPATIENT)
Dept: OCCUPATIONAL THERAPY | Facility: CLINIC | Age: 33
End: 2018-07-09
Payer: COMMERCIAL

## 2018-07-09 DIAGNOSIS — G56.03 CARPAL TUNNEL SYNDROME, BILATERAL: ICD-10-CM

## 2018-07-09 DIAGNOSIS — G56.03 CARPAL TUNNEL SYNDROME ON BOTH SIDES: Primary | ICD-10-CM

## 2018-07-09 PROCEDURE — G8985 CARRY GOAL STATUS: HCPCS | Performed by: OCCUPATIONAL THERAPIST

## 2018-07-09 PROCEDURE — 97165 OT EVAL LOW COMPLEX 30 MIN: CPT | Performed by: OCCUPATIONAL THERAPIST

## 2018-07-09 PROCEDURE — G8984 CARRY CURRENT STATUS: HCPCS | Performed by: OCCUPATIONAL THERAPIST

## 2018-07-09 NOTE — PROGRESS NOTES
OT Evaluation     Today's date: 2018  Patient name: Tushar Dejesus  : 1985  MRN: [id number]  Referring provider: LITA Prasad  Dx:   Encounter Diagnosis     ICD-10-CM    1  Carpal tunnel syndrome on both sides G56 03        Start Time: 1630  Stop Time: 1715  Total time in clinic (min): 45 minutes    Assessment  Impairments: impaired physical strength and pain with function    Assessment details: 34 yo female presents to OT with complaints of pain in her  B hands  MD diagnosed with Bilateral CTS  [place] normal on B hands  Negative  tinel at wrist and elbows B  She notes she does have electrical shocks at wrist at times  Medium nerve glides increased symptoms of numbness and tingling  She has one right wrist splint but doesn't wear  She was requested to bring in to assess for proper fit  Physically, she presents with decreased strength and increased pain  Functionally, her job is repetitive over 10 hours and elicits symptoms of numbness and pain  She reports that she has nocturnal awakening and her entire hand can become numb  Izzy Sinks She would benefit from skilled OT to remediate symptoms of pain, numbness and decreased strength as well as splint management  Understanding of Dx/Px/POC: good   Prognosis: good    Goals  STG:  In 3 weeks  1  Decreased pain to 2-3/10 when performing work/IADL tasks for periods of 2 to 4 hours  2   Patient will be compliant with hs splint schedule for right hand  3   Assess need for left splint  4   I with HEP  LTG 6 weeks  1  Patient will have decreased pain 0-2 after a 10 hour work day  2   Increase FOTO by 10 points  3   Sufficient strength for a 40 hour work week  3   Assess need for left splint  3   Improve FOTO score 10%    LTG's 8 weeks  1    Patient will report minimal to no pain with all activities  2   Sufficient strength to perform ADL's and IADL's        Plan  Patient would benefit from: custom splinting, orthotics, OT eval and skilled occupational therapy  Planned modality interventions: ultrasound, thermotherapy: paraffin bath, thermotherapy: hydrocollator packs and cryotherapy  Planned therapy interventions: joint mobilization, neuromuscular re-education, patient education, body mechanics training, strengthening, stretching, therapeutic activities, therapeutic exercise, home exercise program, graded exercise, graded activity, functional ROM exercises, flexibility and orthotic fitting/training  Frequency: 2x week  Duration in weeks: 6        Subjective Evaluation    History of Present Illness  Date of onset: 2017  Mechanism of injury: At work I lift heavy stuff every day doing the same movement  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  She works 10 hour shifts and will perform the same task for the entire 10 hours  Has been employed at this job for 2 years /40 hour work week  Patient reports the symptoms have worsened over the past year  Recurrent probem    Pain  Current pain ratin  At best pain ratin  At worst pain ratin  Location: Pain eminates on dorsum of hand    Quality: sharp  Relieving factors: rest  Aggravating factors: lifting  Progression: worsening    Social Support  Lives in: multiple-level home    Employment status: working  Hand dominance: right      Diagnostic Tests  No diagnostic tests performed  Abnormal EMG results: scheduled in the first week in August   Treatments  Previous treatment: medication  Patient Goals  Patient goals for therapy: decreased pain  Patient goal:  " I need my hands back with no pain"        Objective     Active Range of Motion     Left Wrist   Wrist flexion: 65 degrees   Wrist extension: 55 degrees   Radial deviation: 15 degrees   Ulnar deviation: 30 degrees     Right Wrist   Wrist flexion: 72 degrees   Wrist extension: 60 degrees   Radial deviation: 20 degrees   Ulnar deviation: 30 degrees     Strength/Myotome Testing     Left Wrist/Hand   Normal wrist strength     (2nd hand position)     Trial 1: 45    Trial 2: 30    Trial 3: 26    Thumb Strength  Key/Lateral Pinch     Trail 1: 5 5  Tip/Two-Point Pinch     Trial 1: 2  Palmar/Three-Point Pinch     Trial 1: 4    Right Wrist/Hand   Normal wrist strength     (2nd hand position)     Trial 1: 29    Trial 2: 28    Trial 3: 30    Thumb Strength   Key/Lateral Pinch     Trial 1: 9 5  Tip/Two-Point Pinch     Trial 1: 2  Palmar/Three-Point Pinch     Trial 1: 4    Tests     Additional Tests Details  - tinel at wrist and elbow bilateral  + [place]in on left with tenderness noted in the 1 st dorsal compartment  [place] 2 83 bilateral all digits          Flowsheet Rows      Most Recent Value   PT/OT G-Codes   Current Score  56   Projected Score  66   FOTO information reviewed  Yes   Assessment Type  Evaluation   G code set  Carrying, Moving & Handling Objects   Carrying, Moving and Handling Objects Current Status ()  CK   Carrying, Moving and Handling Objects Goal Status ()  CJ          Precautions: none    Daily Treatment Diary     Manual  7/9            IASTM                                                                     Exercise Diary              TGE HEP            MNG HEP                                                                                                                                                                                                                                                          Modalities                           CP

## 2018-07-10 ENCOUNTER — TELEPHONE (OUTPATIENT)
Dept: SLEEP CENTER | Facility: CLINIC | Age: 33
End: 2018-07-10

## 2018-07-10 NOTE — TELEPHONE ENCOUNTER
----- Message from Deborah Orozco MD sent at 7/10/2018  8:55 AM EDT -----  Regarding: RE: sleep study approval  Approved  ----- Message -----  From: Fredy Blow: 7/6/2018  11:50 AM  To: [facility] [place], #  Subject: sleep study approval                             Please review for approval or denial   Facundo [email or web address]/5/18 note

## 2018-07-16 ENCOUNTER — OFFICE VISIT (OUTPATIENT)
Dept: OCCUPATIONAL THERAPY | Facility: CLINIC | Age: 33
End: 2018-07-16
Payer: COMMERCIAL

## 2018-07-16 DIAGNOSIS — G56.03 CARPAL TUNNEL SYNDROME ON BOTH SIDES: Primary | ICD-10-CM

## 2018-07-16 PROCEDURE — 97140 MANUAL THERAPY 1/> REGIONS: CPT | Performed by: OCCUPATIONAL THERAPIST

## 2018-07-16 NOTE — PROGRESS NOTES
OT Evaluation     Today's date: 2018  Patient name: Jem Duff  : 1985  MRN: [id number]  Referring provider: LITA Trujillo  Dx:   Encounter Diagnosis     ICD-10-CM    1  Carpal tunnel syndrome on both sides G56 03                   Assessment  Impairments: impaired physical strength and pain with function    Assessment details: 34 yo female presents to OT with complaints of pain in her  B hands  MD diagnosed with Bilateral CTS  [place] normal on B hands  Negative  tinel at wrist and elbows B  She notes she does have electrical shocks at wrist at times  Medium nerve glides increased symptoms of numbness and tingling  She has one right wrist splint but doesn't wear  She was requested to bring in to assess for proper fit  Physically, she presents with decreased strength and increased pain  Functionally, her job is repetitive over 10 hours and elicits symptoms of numbness and pain  She reports that she has nocturnal awakening and her entire hand can become numb  Miguel Ángel Bishop She would benefit from skilled OT to remediate symptoms of pain, numbness and decreased strength as well as splint management  Understanding of Dx/Px/POC: good   Prognosis: good    Goals  STG:  In 3 weeks  1  Decreased pain to 2-3/10 when performing work/IADL tasks for periods of 2 to 4 hours  2   Patient will be compliant with hs splint schedule for right hand  3   Assess need for left splint  4   I with HEP  LTG 6 weeks  1  Patient will have decreased pain 0-2 after a 10 hour work day  2   Increase FOTO by 10 points  3   Sufficient strength for a 40 hour work week  3   Assess need for left splint  3   Improve FOTO score 10%    LTG's 8 weeks  1    Patient will report minimal to no pain with all activities  2   Sufficient strength to perform ADL's and IADL's        Plan  Patient would benefit from: custom splinting, orthotics, OT eval and skilled occupational therapy  Planned modality interventions: ultrasound, thermotherapy: paraffin bath, thermotherapy: hydrocollator packs and cryotherapy  Planned therapy interventions: joint mobilization, neuromuscular re-education, patient education, body mechanics training, strengthening, stretching, therapeutic activities, therapeutic exercise, home exercise program, graded exercise, graded activity, functional ROM exercises, flexibility and orthotic fitting/training  Frequency: 2x week  Duration in weeks: 6        Subjective Evaluation    History of Present Illness  Date of onset: 2017  Mechanism of injury: At work I lift heavy stuff every day doing the same movement  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  She works 10 hour shifts and will perform the same task for the entire 10 hours  Has been employed at this job for 2 years /40 hour work week  Patient reports the symptoms have worsened over the past year  Recurrent probem    Pain  Current pain ratin  At best pain ratin  At worst pain ratin  Location: Pain eminates on dorsum of hand    Quality: sharp  Relieving factors: rest  Aggravating factors: lifting  Progression: worsening    Social Support  Lives in: multiple-level home    Employment status: working  Hand dominance: right      Diagnostic Tests  No diagnostic tests performed  Abnormal EMG results: scheduled in the first week in August   Treatments  Previous treatment: medication  Patient Goals  Patient goals for therapy: decreased pain  Patient goal:  " I need my hands back with no pain"        Objective     Active Range of Motion     Left Wrist   Wrist flexion: 65 degrees   Wrist extension: 55 degrees   Radial deviation: 15 degrees   Ulnar deviation: 30 degrees     Right Wrist   Wrist flexion: 72 degrees   Wrist extension: 60 degrees   Radial deviation: 20 degrees   Ulnar deviation: 30 degrees     Strength/Myotome Testing     Left Wrist/Hand   Normal wrist strength     (2nd hand position)     Trial 1: 45    Trial 2: 30 Trial 3: 26    Thumb Strength  Key/Lateral Pinch     Trail 1: 5 5  Tip/Two-Point Pinch     Trial 1: 2  Palmar/Three-Point Pinch     Trial 1: 4    Right Wrist/Hand   Normal wrist strength     (2nd hand position)     Trial 1: 29    Trial 2: 28    Trial 3: 30    Thumb Strength   Key/Lateral Pinch     Trial 1: 9 5  Tip/Two-Point Pinch     Trial 1: 2  Palmar/Three-Point Pinch     Trial 1: 4    Tests     Additional Tests Details  - tinel at wrist and elbow bilateral  + finklestein on left with tenderness noted in the 1 st dorsal compartment  [place] 2 83 bilateral all digits              Precautions: none    Daily Treatment Diary     Manual  7/9            IASTM                                                                     Exercise Diary              TGE HEP            MNG HEP                                                                                                                                                                                                                                                          Modalities                           CP

## 2018-07-16 NOTE — PROGRESS NOTES
Daily Note     Today's date: 2018  Patient name: Damir Delong  : 1985  MRN: [id number]  Referring provider: LITA Aguilar  Dx:   Encounter Diagnosis     ICD-10-CM    1  Carpal tunnel syndrome on both sides G56 03                   Subjective: "I do the same thing for 8 hours a day"      Objective: See treatment diary below      Assessment:  R sided MN tension >L  Numbness during MNG with medium excursion  Also demonstrating some EDC irritation  Tolerated treatment well  Patient would benefit from continued OT  Plan: Progress treatment as tolerated        Precautions: Universal     Daily Treatment Diary      Manual                     IASTM                       Supine B/L MNG    10'                                                                                                 Exercise Diary                        TGE HEP                     MNG HEP                     Self MNG    3x B/L                   Keypegs   1x B/L                   Wrist strength   3# 2x10                                                                                                                                                                                                                                                                                                                                                                                                 Modalities                        MH  10'                     CP

## 2018-07-19 ENCOUNTER — APPOINTMENT (OUTPATIENT)
Dept: OCCUPATIONAL THERAPY | Facility: CLINIC | Age: 33
End: 2018-07-19
Payer: COMMERCIAL

## 2018-07-23 ENCOUNTER — APPOINTMENT (OUTPATIENT)
Dept: OCCUPATIONAL THERAPY | Facility: CLINIC | Age: 33
End: 2018-07-23
Payer: COMMERCIAL

## 2018-07-24 ENCOUNTER — OFFICE VISIT (OUTPATIENT)
Dept: OCCUPATIONAL THERAPY | Facility: CLINIC | Age: 33
End: 2018-07-24
Payer: COMMERCIAL

## 2018-07-24 DIAGNOSIS — G56.03 CARPAL TUNNEL SYNDROME ON BOTH SIDES: Primary | ICD-10-CM

## 2018-07-24 PROCEDURE — 97140 MANUAL THERAPY 1/> REGIONS: CPT | Performed by: OCCUPATIONAL THERAPIST

## 2018-07-24 PROCEDURE — 97110 THERAPEUTIC EXERCISES: CPT | Performed by: OCCUPATIONAL THERAPIST

## 2018-07-24 NOTE — PROGRESS NOTES
Daily Note     Today's date: 2018  Patient name: Tone Martínez  : 1985  MRN: [id number]  Referring provider: LITA Meyers  Dx:   No diagnosis found  Subjective:       Objective: See treatment diary below      Assessment:  Median nerve tension greatest at last 30 degrees EE  R>L  Numbness persists during gliding in the RUE  Tolerated treatment fair  Patient would benefit from continued OT  Plan: Progress treatment as tolerated        Precautions: Universal     Daily Treatment Diary      Manual                   IASTM                       Supine B/L MNG    10'  15'                                                                                               Exercise Diary                      TGE HEP                     MNG HEP                     Self MNG    3x B/L  2x B/L                 Keypegs   1x B/L  1x B/L                 Wrist strength   3# 2x10  3# 2x10                                                                                                                                                                                                                                                                                                                                                                                               Modalities                        10'  10'                   CP

## 2018-07-26 ENCOUNTER — OFFICE VISIT (OUTPATIENT)
Dept: OCCUPATIONAL THERAPY | Facility: CLINIC | Age: 33
End: 2018-07-26
Payer: COMMERCIAL

## 2018-07-26 DIAGNOSIS — G56.03 CARPAL TUNNEL SYNDROME ON BOTH SIDES: Primary | ICD-10-CM

## 2018-07-26 PROCEDURE — 97110 THERAPEUTIC EXERCISES: CPT | Performed by: OCCUPATIONAL THERAPIST

## 2018-07-26 PROCEDURE — 97140 MANUAL THERAPY 1/> REGIONS: CPT | Performed by: OCCUPATIONAL THERAPIST

## 2018-07-26 PROCEDURE — 97010 HOT OR COLD PACKS THERAPY: CPT | Performed by: OCCUPATIONAL THERAPIST

## 2018-07-26 NOTE — PROGRESS NOTES
Daily Note     Today's date: 2018  Patient name: Justino Baca  : 1985  MRN: [id number]  Referring provider: LITA Abdi  Dx:   Encounter Diagnosis     ICD-10-CM    1  Carpal tunnel syndrome on both sides G56 03                   Subjective:       Objective: See treatment diary below      Assessment:  Continued neural tension, however reports some improvement  Trialing kinesiotape R  Tolerated treatment fair  Patient would benefit from continued OT  Plan: Progress treatment as tolerated        Precautions: Universal     Daily Treatment Diary      Manual                 IASTM                       Supine B/L MNG    10'  15'  15                                                                                             Exercise Diary                    TGE HEP                     MNG HEP                     Self MNG    3x B/L  2x B/L                 Keypegs   1x B/L  1x B/L  1x B               Wrist strength   3# 2x10  3# 2x10  3# 2x10                EDC/ABp       Y gum x 30               Opposition roll       Green ball x 12                                                                                                                                                                                                                                                                                                                                             Modalities                    MH  10'  10'  10                 CP

## 2018-07-30 ENCOUNTER — APPOINTMENT (OUTPATIENT)
Dept: OCCUPATIONAL THERAPY | Facility: CLINIC | Age: 33
End: 2018-07-30
Payer: COMMERCIAL

## 2018-08-02 ENCOUNTER — APPOINTMENT (OUTPATIENT)
Dept: OCCUPATIONAL THERAPY | Facility: CLINIC | Age: 33
End: 2018-08-02
Payer: COMMERCIAL

## 2018-08-06 ENCOUNTER — OFFICE VISIT (OUTPATIENT)
Dept: OCCUPATIONAL THERAPY | Facility: CLINIC | Age: 33
End: 2018-08-06
Payer: COMMERCIAL

## 2018-08-06 DIAGNOSIS — G56.03 CARPAL TUNNEL SYNDROME ON BOTH SIDES: Primary | ICD-10-CM

## 2018-08-06 PROCEDURE — 97140 MANUAL THERAPY 1/> REGIONS: CPT | Performed by: OCCUPATIONAL THERAPIST

## 2018-08-06 PROCEDURE — 97112 NEUROMUSCULAR REEDUCATION: CPT | Performed by: OCCUPATIONAL THERAPIST

## 2018-08-06 PROCEDURE — 97110 THERAPEUTIC EXERCISES: CPT | Performed by: OCCUPATIONAL THERAPIST

## 2018-08-06 PROCEDURE — 97010 HOT OR COLD PACKS THERAPY: CPT | Performed by: OCCUPATIONAL THERAPIST

## 2018-08-06 NOTE — PROGRESS NOTES
Pt did not return for follow up visits  Daily Note     Today's date: 2018  Patient name: Elif Doctor  : 1985  MRN: [id number]  Referring provider: LITA Leon  Dx:   Encounter Diagnosis     ICD-10-CM    1  Carpal tunnel syndrome on both sides G56 03                   Subjective: "I guess it's a little better"      Objective: See treatment diary below      Assessment: Focusing on  Proximal decompression/posture training  EMG tomorrow, continued neural tension and pain  Plan: Progress treatment as tolerated        Precautions: Universal     Daily Treatment Diary      Manual    8             IASTM                       Supine B/L MNG    10'  15'  15  10                                                                                           Exercise Diary       86             TGE HEP                     MNG HEP                     Self MNG    3x B/L  2x B/L                 Keypegs   1x B/L  1x B/L  1x B  x1 B             Wrist strength   3# 2x10  3# 2x10  3# 2x10                EDC/ABp       Y gum x 30  Y gum x 30 B             Opposition roll       Green ball x 12  Green nx12 B               Scap adduction U/M/L         RTB 3x10                                                                                                                                                                                                                                                                                                                   Modalities     8/6               MH  10'  10'  10  10               CP

## 2018-08-07 ENCOUNTER — OFFICE VISIT (OUTPATIENT)
Dept: FAMILY MEDICINE CLINIC | Facility: OTHER | Age: 33
End: 2018-08-07
Payer: COMMERCIAL

## 2018-08-07 ENCOUNTER — HOSPITAL ENCOUNTER (OUTPATIENT)
Dept: RADIOLOGY | Facility: CLINIC | Age: 33
Discharge: HOME | End: 2018-08-07
Admitting: PHYSICAL MEDICINE & REHABILITATION
Payer: COMMERCIAL

## 2018-08-07 VITALS
SYSTOLIC BLOOD PRESSURE: 132 MMHG | HEART RATE: 87 BPM | TEMPERATURE: 98.7 F | WEIGHT: 196.8 LBS | BODY MASS INDEX: 32.25 KG/M2 | DIASTOLIC BLOOD PRESSURE: 76 MMHG | OXYGEN SATURATION: 98 %

## 2018-08-07 DIAGNOSIS — G43.709 CHRONIC MIGRAINE WITHOUT AURA WITHOUT STATUS MIGRAINOSUS, NOT INTRACTABLE: Primary | ICD-10-CM

## 2018-08-07 DIAGNOSIS — R20.2 PARESTHESIA: ICD-10-CM

## 2018-08-07 DIAGNOSIS — I10 ESSENTIAL HYPERTENSION: ICD-10-CM

## 2018-08-07 DIAGNOSIS — Z3A.01 LESS THAN 8 WEEKS GESTATION OF PREGNANCY: ICD-10-CM

## 2018-08-07 PROCEDURE — 99214 OFFICE O/P EST MOD 30 MIN: CPT | Performed by: NURSE PRACTITIONER

## 2018-08-07 PROCEDURE — 95909 NRV CNDJ TST 5-6 STUDIES: CPT | Performed by: PHYSICAL MEDICINE & REHABILITATION

## 2018-08-07 PROCEDURE — 95885 MUSC TST DONE W/NERV TST LIM: CPT

## 2018-08-07 PROCEDURE — 95885 MUSC TST DONE W/NERV TST LIM: CPT | Performed by: PHYSICAL MEDICINE & REHABILITATION

## 2018-08-07 NOTE — PROCEDURES
Procedures      Electromyogram and Nerve Conduction Velocity Procedure Note    HX:   24-year-old right-hand-dominant female with paresthesias and discomfort in both hands right greater than left  No history of trauma but she does  Notice symptoms increased with gripping and grasping activities as well as sometimes nocturnally  She denies any associated neck or shoulder pain  PMH:     Migraine headaches  Exam:      Reflex, motor, and sensory exam are entirely normal   There are no long tract signs, no fasciculations, tremors, or atrophy are noted  Positive Tinel sign over both median nerves  Procedure:  Verbal informed consent was obtained as with all electrodiagnostic medicine patients  As with all patients this patient was informed that they may terminate the  examine at any time  Patient tolerated the procedure well with no adverse effects reported or observed  Findings:  Please see the [employer] data printout  There is mild relative slowing of the median sensory fibers across the carpal ligament segment on the right side  Otherwise nerve conduction velocities were entirely normal     Electromyography: Monopolar needle exploration failed to reveal any abnormal spontaneous potentials the following muscles : Bilateral cervical paraspinals bilateral deltoid, bilateral triceps, bilateral brachioradialis, right abductor pollicis brevis and left 1st dorsal interosseous  In all muscles motor units were normal for amplitude, duration, and configuration, and recruitment pattern normal as well  Conclusion:     1  There is evidence for only mild median mononeuropathy at the right wrist with supports clinical diagnosis of a mild "carpal tunnel syndrome  Median nerve function is otherwise entirely normal on the right and normal on the left      2   There is no electrophysiologic data in the nerves and muscles tested today to indicate or suggest a cervical radiculopathy, plexopathy, or large fiber polyneuropathy  Recommendations:        careful clinical correlation is advised

## 2018-08-07 NOTE — PROGRESS NOTES
Assessment/Plan:         Problem List Items Addressed This Visit     Chronic migraine without aura without status migrainosus, not intractable - Primary    Essential hypertension    Less than 8 weeks gestation of pregnancy  --Referred to OB per below  --Has stopped all Rx/OTC meds except occasional Tylenol  Reminded not to take NSAIDs during pregnancy  OTC loratadine prn OK for dog allergies  --Taking prenatal vitamin  --BP close to normal today off B-blocker  Monitor at home and during pregnancy, notifying us if > 140/90 or if LE edema develops  --Sleep study scheduled for next month  Relevant Orders    Ambulatory referral to Obstetrics / Gynecology            Subjective:      Patient ID: Corky Severin is a 35 y o  female  Here for follow-up to blood pressure  No home readings since last visit  Stopped propranolol last week because she just found out she was pregnant  No AE's from abrupt discontinuance  Stopped all other meds as well  Positive home pregnancy test x 2  No menses since 8/2  No spotting, abdominal pain  Mild nausea when brushing teeth only  Started prenatal MVI  No recent migraines  Recent normal labs  Remains un refreshed in the morning  Sleep study scheduled next month  Gets allergies when she is around dogs (runny nose, watery/itchy eyes)  Wondering what is safe to take  The following portions of the patient's history were reviewed and updated as appropriate: allergies, current medications, past family history, past medical history, past social history, past surgical history and problem list     Review of Systems   Respiratory: Negative for shortness of breath  Gastrointestinal: Negative for abdominal pain  Neurological: Negative for dizziness and headaches           Objective:      /76 (BP Location: Left arm, Patient Position: Sitting, Cuff Size: Standard)   Pulse 87   Temp 98 7 °F (37 1 °C) (Tympanic)   Wt 89 3 kg (196 lb 12 8 oz)   SpO2 98%   BMI 32 25 kg/m²          Physical Exam   Constitutional: She is oriented to person, place, and time  She appears well-developed  HENT:   Mouth/Throat: Oropharynx is clear and moist    Cardiovascular: Normal rate and normal heart sounds  Pulmonary/Chest: Effort normal and breath sounds normal    Musculoskeletal: She exhibits no edema  Neurological: She is alert and oriented to person, place, and time  Psychiatric: She has a normal mood and affect

## 2018-08-09 ENCOUNTER — APPOINTMENT (OUTPATIENT)
Dept: OCCUPATIONAL THERAPY | Facility: CLINIC | Age: 33
End: 2018-08-09
Payer: COMMERCIAL

## 2018-08-13 ENCOUNTER — APPOINTMENT (OUTPATIENT)
Dept: OCCUPATIONAL THERAPY | Facility: CLINIC | Age: 33
End: 2018-08-13
Payer: COMMERCIAL

## 2018-08-16 ENCOUNTER — APPOINTMENT (OUTPATIENT)
Dept: OCCUPATIONAL THERAPY | Facility: CLINIC | Age: 33
End: 2018-08-16
Payer: COMMERCIAL

## 2018-08-21 ENCOUNTER — OFFICE VISIT (OUTPATIENT)
Dept: FAMILY MEDICINE CLINIC | Facility: CLINIC | Age: 33
End: 2018-08-21
Payer: COMMERCIAL

## 2018-08-21 VITALS
RESPIRATION RATE: 16 BRPM | BODY MASS INDEX: 31.79 KG/M2 | OXYGEN SATURATION: 98 % | TEMPERATURE: 98.2 F | HEART RATE: 79 BPM | DIASTOLIC BLOOD PRESSURE: 80 MMHG | HEIGHT: 66 IN | WEIGHT: 197.8 LBS | SYSTOLIC BLOOD PRESSURE: 130 MMHG

## 2018-08-21 DIAGNOSIS — Z3A.01 LESS THAN 8 WEEKS GESTATION OF PREGNANCY: ICD-10-CM

## 2018-08-21 DIAGNOSIS — O26.851 SPOTTING AFFECTING PREGNANCY IN FIRST TRIMESTER: ICD-10-CM

## 2018-08-21 DIAGNOSIS — I10 ESSENTIAL HYPERTENSION: Primary | ICD-10-CM

## 2018-08-21 PROBLEM — F32.A MILD DEPRESSION: Status: ACTIVE | Noted: 2017-07-27

## 2018-08-21 PROBLEM — R20.2 PARESTHESIA: Status: RESOLVED | Noted: 2018-06-05 | Resolved: 2018-08-21

## 2018-08-21 PROBLEM — F32.A MILD DEPRESSION: Status: RESOLVED | Noted: 2017-07-27 | Resolved: 2018-08-21

## 2018-08-21 PROBLEM — R35.89 POLYURIA: Status: RESOLVED | Noted: 2018-06-05 | Resolved: 2018-08-21

## 2018-08-21 PROBLEM — G43.711 INTRACTABLE CHRONIC MIGRAINE WITHOUT AURA AND WITH STATUS MIGRAINOSUS: Status: RESOLVED | Noted: 2018-04-17 | Resolved: 2018-08-21

## 2018-08-21 LAB — SL AMB POCT URINE HCG: POSITIVE

## 2018-08-21 PROCEDURE — 3079F DIAST BP 80-89 MM HG: CPT | Performed by: NURSE PRACTITIONER

## 2018-08-21 PROCEDURE — 81025 URINE PREGNANCY TEST: CPT | Performed by: NURSE PRACTITIONER

## 2018-08-21 PROCEDURE — 99214 OFFICE O/P EST MOD 30 MIN: CPT | Performed by: NURSE PRACTITIONER

## 2018-08-21 PROCEDURE — 3008F BODY MASS INDEX DOCD: CPT | Performed by: NURSE PRACTITIONER

## 2018-08-21 PROCEDURE — 3075F SYST BP GE 130 - 139MM HG: CPT | Performed by: NURSE PRACTITIONER

## 2018-08-21 RX ORDER — LANOLIN ALCOHOL/MO/W.PET/CERES
400 CREAM (GRAM) TOPICAL DAILY
COMMUNITY
End: 2021-08-09 | Stop reason: ALTCHOICE

## 2018-08-21 NOTE — PROGRESS NOTES
Assessment/Plan:    No problem-specific Assessment & Plan notes found for this encounter  Diagnoses and all orders for this visit:    Essential hypertension    Less than 8 weeks gestation of pregnancy    Spotting affecting pregnancy in first trimester  -     POCT urine HCG    Other orders  -     Cancel: POCT urine HCG  -     folic acid (FOLVITE) 545 mcg tablet; Take 400 mcg by mouth daily        Patient given literature about approved over-the-counter medications during pregnancy  Urine HCG remains positive status post 3 hours of spotting last week  Patient has gyn appointment coming up next week  Patient encouraged to keep Neurology appointment  Patient advised to RTO -recheck blood pressure in 3 months - may need to start methyldopa or metoprolol for blood pressure management during preg  Obtain flu shot when avail  Subjective:   Chief Complaint   Patient presents with   [address]   Patient says she took 3 pregnancy test and they all were positive   PHQ 2 done        Patient ID: Myrna Lara is a 35 y o  female  HPI  Patient is a transfer from another [address] [place] [address] primary care provider  She is approximately 8 weeks pregnant - last mense 18 - [place]   3 para 1 with 1 spontaneous miscarriage at 11 weeks -back in   Patient reports having a gyn visit coming up next week  Patient also reports being on folic acid/prenatals  Patient reports had 3 hours of spotting last week bright red blood no clots no cramps  Urine POC for HCG is still positive  Patient reports nausea in the morning and tender breasts; no vomiting  Past medical history: significant for hypertension and chronic migraine without aura  She is off topiramate/propranolol  BP is stable 130/ 80s at this time  + bilateral carpal tunnel in which she has been treating with occupational therapy and bracing      Patient does follow with Neurology and sleep specialist     Lab review done: Lipid profile June of 2018 164, triglycerides 131, HDL 53, LDL 85; hemoglobin A1c 5 1; CMP normal; CBC normal    PHQ-9 Depression Screening    PHQ-9:    Frequency of the following problems over the past two weeks:       Little interest or pleasure in doing things:  0 - not at all  Feeling down, depressed, or hopeless:  0 - not at all  PHQ-2 Score:  0           The following portions of the patient's history were reviewed and updated as appropriate: allergies, past social history, past surgical history and problem list     Review of Systems   Constitutional: Negative for activity change and appetite change  HENT: Negative  Eyes: Negative  Respiratory: Negative  Cardiovascular: Negative  Negative for chest pain  Gastrointestinal: Positive for nausea  Endocrine: Negative  Negative for cold intolerance  Genitourinary: Negative  See hpi   Musculoskeletal: Negative  Skin: Negative  Allergic/Immunologic: Negative  Neurological: Negative  Hematological: Negative  Psychiatric/Behavioral: Negative  All other systems reviewed and are negative  Objective:      /80 (BP Location: Left arm, Patient Position: Sitting, Cuff Size: Large)   Pulse 79   Temp 98 2 °F (36 8 °C) (Oral)   Resp 16   Ht 5' 5 75" (1 67 m)   Wt 89 7 kg (197 lb 12 8 oz)   LMP 07/02/2018   SpO2 98%   BMI 32 17 kg/m²          Physical Exam   Constitutional: She is oriented to person, place, and time  She appears well-developed and well-nourished  No distress  HENT:   Head: Normocephalic  Right Ear: Tympanic membrane and external ear normal  No decreased hearing is noted  Left Ear: Tympanic membrane and external ear normal  No decreased hearing is noted  Mouth/Throat: Oropharynx is clear and moist    Eyes: Conjunctivae are normal  Pupils are equal, round, and reactive to light  Neck: Normal range of motion  Neck supple  No thyromegaly present     Cardiovascular: Normal rate, regular rhythm, normal heart sounds and intact distal pulses  No murmur heard  Pulmonary/Chest: Effort normal and breath sounds normal  No respiratory distress  Abdominal: Soft  Bowel sounds are normal    Musculoskeletal: Normal range of motion  Lymphadenopathy:     She has no cervical adenopathy  Neurological: She is alert and oriented to person, place, and time  She has normal reflexes  No cranial nerve deficit  Coordination normal    Skin: Skin is warm and dry  Psychiatric: She has a normal mood and affect   Her behavior is normal  Judgment and thought content normal

## 2018-08-27 ENCOUNTER — INITIAL PRENATAL (OUTPATIENT)
Dept: OBGYN CLINIC | Facility: CLINIC | Age: 33
End: 2018-08-27

## 2018-08-27 VITALS
HEART RATE: 88 BPM | SYSTOLIC BLOOD PRESSURE: 128 MMHG | WEIGHT: 199 LBS | RESPIRATION RATE: 20 BRPM | BODY MASS INDEX: 33.15 KG/M2 | DIASTOLIC BLOOD PRESSURE: 82 MMHG | HEIGHT: 65 IN

## 2018-08-27 DIAGNOSIS — Z34.81 ENCOUNTER FOR SUPERVISION OF OTHER NORMAL PREGNANCY IN FIRST TRIMESTER: Primary | ICD-10-CM

## 2018-08-27 DIAGNOSIS — Z3A.01 LESS THAN 8 WEEKS GESTATION OF PREGNANCY: ICD-10-CM

## 2018-08-27 PROCEDURE — OBC: Performed by: OBSTETRICS & GYNECOLOGY

## 2018-08-27 NOTE — PROGRESS NOTES
OB intake complete  Prenatal labs ordered including - prenatal panel, varicella, and ultrasound (labs ordered at [address] per patient request)  S/S of pregnancy - nausea, breast tenderness, and fatigue  Genetic screening reviewed -- unsure  PHQ 9 screening results - 0  FOB involved and supportive

## 2018-08-29 PROCEDURE — G8990 OTHER PT/OT CURRENT STATUS: HCPCS | Performed by: OCCUPATIONAL THERAPIST

## 2018-08-29 PROCEDURE — G8992 OTHER PT/OT  D/C STATUS: HCPCS | Performed by: OCCUPATIONAL THERAPIST

## 2018-08-29 PROCEDURE — G8991 OTHER PT/OT GOAL STATUS: HCPCS | Performed by: OCCUPATIONAL THERAPIST

## 2018-09-05 ENCOUNTER — LAB REQUISITION (OUTPATIENT)
Dept: LAB | Facility: HOSPITAL | Age: 33
End: 2018-09-05
Payer: COMMERCIAL

## 2018-09-05 ENCOUNTER — APPOINTMENT (OUTPATIENT)
Dept: LAB | Facility: AMBULARY SURGERY CENTER | Age: 33
End: 2018-09-05
Attending: OBSTETRICS & GYNECOLOGY
Payer: COMMERCIAL

## 2018-09-05 DIAGNOSIS — Z3A.01 LESS THAN 8 WEEKS GESTATION OF PREGNANCY: ICD-10-CM

## 2018-09-05 DIAGNOSIS — Z34.81 ENCOUNTER FOR SUPERVISION OF OTHER NORMAL PREGNANCY IN FIRST TRIMESTER: ICD-10-CM

## 2018-09-05 LAB
ABO GROUP BLD: NORMAL
BACTERIA UR QL AUTO: ABNORMAL /HPF
BASOPHILS # BLD AUTO: 0.01 THOUSANDS/ΜL (ref 0–0.1)
BASOPHILS NFR BLD AUTO: 0 % (ref 0–1)
BILIRUB UR QL STRIP: NEGATIVE
BLD GP AB SCN SERPL QL: NEGATIVE
CLARITY UR: ABNORMAL
COLOR UR: YELLOW
EOSINOPHIL # BLD AUTO: 0.06 THOUSAND/ΜL (ref 0–0.61)
EOSINOPHIL NFR BLD AUTO: 1 % (ref 0–6)
ERYTHROCYTE [DISTWIDTH] IN BLOOD BY AUTOMATED COUNT: 12.8 % (ref 11.6–15.1)
GLUCOSE UR STRIP-MCNC: NEGATIVE MG/DL
HCT VFR BLD AUTO: 39.9 % (ref 34.8–46.1)
HGB BLD-MCNC: 13.6 G/DL (ref 11.5–15.4)
HGB UR QL STRIP.AUTO: NEGATIVE
HYALINE CASTS #/AREA URNS LPF: ABNORMAL /LPF
IMM GRANULOCYTES # BLD AUTO: 0.01 THOUSAND/UL (ref 0–0.2)
IMM GRANULOCYTES NFR BLD AUTO: 0 % (ref 0–2)
KETONES UR STRIP-MCNC: NEGATIVE MG/DL
LEUKOCYTE ESTERASE UR QL STRIP: ABNORMAL
LYMPHOCYTES # BLD AUTO: 1.36 THOUSANDS/ΜL (ref 0.6–4.47)
LYMPHOCYTES NFR BLD AUTO: 18 % (ref 14–44)
MCH RBC QN AUTO: 29 PG (ref 26.8–34.3)
MCHC RBC AUTO-ENTMCNC: 34.1 G/DL (ref 31.4–37.4)
MCV RBC AUTO: 85 FL (ref 82–98)
MONOCYTES # BLD AUTO: 0.47 THOUSAND/ΜL (ref 0.17–1.22)
MONOCYTES NFR BLD AUTO: 6 % (ref 4–12)
NEUTROPHILS # BLD AUTO: 5.76 THOUSANDS/ΜL (ref 1.85–7.62)
NEUTS SEG NFR BLD AUTO: 75 % (ref 43–75)
NITRITE UR QL STRIP: NEGATIVE
NON-SQ EPI CELLS URNS QL MICRO: ABNORMAL /HPF
NRBC BLD AUTO-RTO: 0 /100 WBCS
PH UR STRIP.AUTO: 8 [PH] (ref 4.5–8)
PLATELET # BLD AUTO: 244 THOUSANDS/UL (ref 149–390)
PMV BLD AUTO: 10.5 FL (ref 8.9–12.7)
PROT UR STRIP-MCNC: ABNORMAL MG/DL
RBC # BLD AUTO: 4.69 MILLION/UL (ref 3.81–5.12)
RBC #/AREA URNS AUTO: ABNORMAL /HPF
RH BLD: POSITIVE
RUBV IGG SERPL IA-ACNC: 167.7 IU/ML
SP GR UR STRIP.AUTO: 1.02 (ref 1–1.03)
SPECIMEN EXPIRATION DATE: NORMAL
UROBILINOGEN UR QL STRIP.AUTO: 0.2 E.U./DL
WBC # BLD AUTO: 7.67 THOUSAND/UL (ref 4.31–10.16)
WBC #/AREA URNS AUTO: ABNORMAL /HPF

## 2018-09-05 PROCEDURE — 86787 VARICELLA-ZOSTER ANTIBODY: CPT

## 2018-09-05 PROCEDURE — 81001 URINALYSIS AUTO W/SCOPE: CPT

## 2018-09-05 PROCEDURE — 36415 COLL VENOUS BLD VENIPUNCTURE: CPT

## 2018-09-05 PROCEDURE — 80081 OBSTETRIC PANEL INC HIV TSTG: CPT

## 2018-09-05 PROCEDURE — 87086 URINE CULTURE/COLONY COUNT: CPT

## 2018-09-06 DIAGNOSIS — G43.709 CHRONIC MIGRAINE WITHOUT AURA WITHOUT STATUS MIGRAINOSUS, NOT INTRACTABLE: Primary | ICD-10-CM

## 2018-09-06 LAB
BACTERIA UR CULT: NORMAL
HBV SURFACE AG SER QL: NORMAL
RPR SER QL: NORMAL
VZV IGG SER IA-ACNC: NORMAL

## 2018-09-06 NOTE — PROGRESS NOTES
Patient ID: Nina Esquivel is a 35 y o  female  Assessment/Plan:    Chronic migraine without aura without status migrainosus, not intractable  Preventative:  Magnesium 400 mg a day  Vitamin B2 (riboflavin) 400 mg a day  Continue all of your prenatal vitamins    Abortive:  Limit Tylenol less than 3 times a week  But check with OB         Problem List Items Addressed This Visit        Cardiovascular and Mediastinum    Chronic migraine without aura without status migrainosus, not intractable     Preventative:  Magnesium 400 mg a day  Vitamin B2 (riboflavin) 400 mg a day  Continue all of your prenatal vitamins    Abortive:  Limit Tylenol less than 3 times a week  But check with OB                  Subjective:    HPI  We had the pleasure of evaluating Nina Esquivel in neurological follow-up today  As you know she is a 35year old right handed female  She has been working at an [employer]  Currently pregnant and due in April 2019         Carpal tunnel syndrome:   - At her last appt she had been experiencing paresthesias in the bilat hands in the median nerve distribution for the past 2 weeks  They would awaken her at night and occur when she is using her phone or driving  Was better but now worse  Complains of numbness in thumb, and first 2 fingers  Also has numbness in both hands after sleeping and sometimes even just with arm extended    EMG 8/2018 Conclusion:     1  There is evidence for only mild median mononeuropathy at the right wrist with supports clinical diagnosis of a mild "carpal tunnel syndrome  Median nerve function is otherwise entirely normal on the right and normal on the left      2    There is no electrophysiologic data in the nerves and muscles tested today to indicate or suggest a cervical radiculopathy, plexopathy, or large fiber polyneuropathy     Chronic Migraine headaches:   current headache is 1-2/10  How often do the headaches occur - 1 time a week     What time of the day do the headaches start - can happen at any time  How long do the headaches last - a few hours (3-4 hours)  Where are they located - bilateral temporal, frontal, occipital, top of head  What is the intensity of pain - 6-10/10 (average 6-7/10)  Describe your usual headache - pounding  Aura: None  Associated symptoms: photophobia, blurred vision, phonophobia, sensitive to smells, Problem with concentration, lacrimation, prefer to be alone and in a dark room  Headache trigger: stress, Weather change - heat  Headaches are worse if the patient: putting hair in a tight band  PREVENTIVE: magnesium, B2, cyproheptadine, Indural  ABORTIVE: Advil, Tylenol        The following portions of the patient's history were reviewed and updated as appropriate: allergies, current medications, past family history, past medical history, past social history, past surgical history and problem list          Objective:    Blood pressure 130/87, pulse 80, height 5' 5" (1 651 m), weight 89 kg (196 lb 1 6 oz), last menstrual period 07/02/2018  Physical Exam   Constitutional: She appears well-developed and well-nourished  HENT:   Head: Normocephalic and atraumatic  Eyes: EOM are normal  Pupils are equal, round, and reactive to light  Neck: Normal range of motion  Cardiovascular: Normal rate  Pulmonary/Chest: Effort normal    Musculoskeletal: Normal range of motion  Neurological: She has normal strength and normal reflexes  Gait and coordination normal    Skin: Skin is warm and dry  Psychiatric: She has a normal mood and affect  Her speech is normal    Nursing note and vitals reviewed  Neurological Exam    Mental Status  The patient is alert and oriented to person, place, time, and situation  Her recent and remote memory are normal  She has no visuospatial neglect  Her speech is normal  Her language is fluent with no aphasia  She has normal attention span and concentration  She has a normal fund of knowledge      Cranial Nerves    CN II: The patient's visual acuity and visual fields are normal   CN III, IV, VI: The patient's pupils are equally round and reactive to light and ocular movements are normal   CN V: The patient has normal facial sensation  CN VII:  The patient has symmetric facial movement  CN VIII:  The patient's hearing is normal   CN IX, X: The patient has symmetric palate movement and normal gag reflex  CN XI: The patient's shoulder shrug strength is normal   CN XII: The patient's tongue is midline without atrophy or fasciculations  Motor  The patient has normal muscle bulk throughout  Her overall muscle tone is normal throughout  Her strength is 5/5 throughout all four extremities  Sensory  The patient's sensation is normal in all four extremities  She has normal cortical sensation    Reflexes  Deep tendon reflexes are 2+ and symmetric in all four extremities with downgoing toes bilaterally  Gait and Coordination  The patient has normal gait and station and normal casual, toe, heel, and tandem gait  She has normal coordination bilaterally  ROS:    Review of Systems   HENT: Negative  Eyes: Negative  Respiratory: Negative  Cardiovascular: Negative  Gastrointestinal: Positive for nausea  Endocrine: Negative  Genitourinary: Negative  Musculoskeletal: Positive for back pain  Skin: Negative  Allergic/Immunologic: Negative  Neurological: Positive for weakness, numbness and headaches  Hematological: Bruises/bleeds easily  Psychiatric/Behavioral: Negative

## 2018-09-08 LAB — HIV 1+2 AB+HIV1 P24 AG SERPL QL IA: NORMAL

## 2018-09-10 ENCOUNTER — HOSPITAL ENCOUNTER (OUTPATIENT)
Dept: ULTRASOUND IMAGING | Facility: HOSPITAL | Age: 33
Discharge: HOME | End: 2018-09-10
Attending: OBSTETRICS & GYNECOLOGY
Payer: COMMERCIAL

## 2018-09-10 DIAGNOSIS — Z34.81 ENCOUNTER FOR SUPERVISION OF OTHER NORMAL PREGNANCY IN FIRST TRIMESTER: ICD-10-CM

## 2018-09-10 DIAGNOSIS — Z3A.01 LESS THAN 8 WEEKS GESTATION OF PREGNANCY: ICD-10-CM

## 2018-09-10 PROCEDURE — 76801 OB US < 14 WKS SINGLE FETUS: CPT

## 2018-09-11 ENCOUNTER — DOCUMENTATION (OUTPATIENT)
Dept: OBGYN CLINIC | Facility: CLINIC | Age: 33
End: 2018-09-11

## 2018-09-11 ENCOUNTER — OFFICE VISIT (OUTPATIENT)
Dept: NEUROLOGY | Facility: CLINIC | Age: 33
End: 2018-09-11
Payer: COMMERCIAL

## 2018-09-11 VITALS
WEIGHT: 196.1 LBS | DIASTOLIC BLOOD PRESSURE: 87 MMHG | BODY MASS INDEX: 32.67 KG/M2 | HEART RATE: 80 BPM | SYSTOLIC BLOOD PRESSURE: 130 MMHG | HEIGHT: 65 IN

## 2018-09-11 DIAGNOSIS — G43.709 CHRONIC MIGRAINE WITHOUT AURA WITHOUT STATUS MIGRAINOSUS, NOT INTRACTABLE: ICD-10-CM

## 2018-09-11 PROCEDURE — 99214 OFFICE O/P EST MOD 30 MIN: CPT | Performed by: PHYSICIAN ASSISTANT

## 2018-09-11 NOTE — ASSESSMENT & PLAN NOTE
Preventative:  Magnesium 400 mg a day  Vitamin B2 (riboflavin) 400 mg a day  Continue all of your prenatal vitamins    Abortive:  Limit Tylenol less than 3 times a week  But check with OB

## 2018-09-11 NOTE — PATIENT INSTRUCTIONS
Preventative:  Magnesium 400 mg a day  Vitamin B2 (riboflavin) 400 mg a day  Continue all of your prenatal vitamins    Abortive:  Limit Tylenol less than 3 times a week  But check with OB    If your headaches worsen then we can start cyproheptadine at bedtime during pregnancy  Call us if anything worsens    May take these over-the-counter supplements to decrease intensity and frequency of migraines  - Magnesium Oxide 400-500 mg a day  If any diarrhea or upset stomach, decrease dose  as tolerated  -  B2 200 mg a day  May take once a day in am  This supplement will change the color of the urine to fluorescent yellow no matter how hydrated, which is normal      Discussed side effects of all medications prescribed today to the patient in detail  Patient education was completed today and we also discussed precautions for rebound headaches  When patient has a moderate to severe headache, they should seek rest, initiate relaxation and apply cold compresses to the head  1  Maintain regular sleep schedule  Adults need at least 7-8 hours of uninterrupted a night  2  Limit over the counter medications such as Tylenol, Ibuprofen, Aleve, Excedrin  (No more than 3 times a week)  3  Maintain headache diary  We discussed an VLADIMIR for a smart phone is "Migraine eDiary"  4  Limit caffeine to 1-2 cups a day or less  5  Avoid dietary trigger  (list given to the patient and reviewed with them)  6  Patient is to have regular frequent meals to prevent headache onset  7   Please drink at least 64 ounces of water a day to help remain hydrated

## 2018-09-11 NOTE — PROGRESS NOTES
Review of Systems   HENT: Negative  Eyes: Negative  Respiratory: Negative  Cardiovascular: Negative  Gastrointestinal: Positive for nausea  Endocrine: Negative  Genitourinary: Negative  Musculoskeletal: Positive for back pain  Skin: Negative  Allergic/Immunologic: Negative  Neurological: Positive for weakness (Bilateral hands), numbness (Bilateral fingers) and headaches  Hematological: Bruises/bleeds easily  Psychiatric/Behavioral: Negative

## 2018-09-17 ENCOUNTER — APPOINTMENT (OUTPATIENT)
Dept: LAB | Facility: AMBULARY SURGERY CENTER | Age: 33
End: 2018-09-17
Attending: OBSTETRICS & GYNECOLOGY
Payer: COMMERCIAL

## 2018-09-17 ENCOUNTER — ROUTINE PRENATAL (OUTPATIENT)
Dept: OBGYN CLINIC | Facility: CLINIC | Age: 33
End: 2018-09-17

## 2018-09-17 VITALS — DIASTOLIC BLOOD PRESSURE: 74 MMHG | BODY MASS INDEX: 32.28 KG/M2 | SYSTOLIC BLOOD PRESSURE: 122 MMHG | WEIGHT: 194 LBS

## 2018-09-17 DIAGNOSIS — Z34.81 ENCOUNTER FOR SUPERVISION OF OTHER NORMAL PREGNANCY IN FIRST TRIMESTER: Primary | ICD-10-CM

## 2018-09-17 DIAGNOSIS — Z11.3 SCREENING FOR STDS (SEXUALLY TRANSMITTED DISEASES): ICD-10-CM

## 2018-09-17 DIAGNOSIS — I10 ESSENTIAL HYPERTENSION: ICD-10-CM

## 2018-09-17 DIAGNOSIS — Z3A.11 11 WEEKS GESTATION OF PREGNANCY: ICD-10-CM

## 2018-09-17 PROBLEM — Z34.90 SUPERVISION OF NORMAL PREGNANCY: Status: ACTIVE | Noted: 2018-09-17

## 2018-09-17 LAB
ALBUMIN SERPL BCP-MCNC: 3.4 G/DL (ref 3.5–5)
ALP SERPL-CCNC: 66 U/L (ref 46–116)
ALT SERPL W P-5'-P-CCNC: 22 U/L (ref 12–78)
ANION GAP SERPL CALCULATED.3IONS-SCNC: 6 MMOL/L (ref 4–13)
AST SERPL W P-5'-P-CCNC: 12 U/L (ref 5–45)
BILIRUB SERPL-MCNC: 0.66 MG/DL (ref 0.2–1)
BUN SERPL-MCNC: 5 MG/DL (ref 5–25)
CALCIUM SERPL-MCNC: 8.8 MG/DL (ref 8.3–10.1)
CHLORIDE SERPL-SCNC: 105 MMOL/L (ref 100–108)
CO2 SERPL-SCNC: 27 MMOL/L (ref 21–32)
CREAT SERPL-MCNC: 0.51 MG/DL (ref 0.6–1.3)
CREAT UR-MCNC: 246 MG/DL
GFR SERPL CREATININE-BSD FRML MDRD: 127 ML/MIN/1.73SQ M
GLUCOSE SERPL-MCNC: 79 MG/DL (ref 65–140)
POTASSIUM SERPL-SCNC: 4 MMOL/L (ref 3.5–5.3)
PROT SERPL-MCNC: 7.5 G/DL (ref 6.4–8.2)
PROT UR-MCNC: 15 MG/DL
PROT/CREAT UR: 0.06 MG/G{CREAT} (ref 0–0.1)
SODIUM SERPL-SCNC: 138 MMOL/L (ref 136–145)

## 2018-09-17 PROCEDURE — 87591 N.GONORRHOEAE DNA AMP PROB: CPT | Performed by: OBSTETRICS & GYNECOLOGY

## 2018-09-17 PROCEDURE — 36415 COLL VENOUS BLD VENIPUNCTURE: CPT

## 2018-09-17 PROCEDURE — 87491 CHLMYD TRACH DNA AMP PROBE: CPT | Performed by: OBSTETRICS & GYNECOLOGY

## 2018-09-17 PROCEDURE — 84156 ASSAY OF PROTEIN URINE: CPT

## 2018-09-17 PROCEDURE — PNV: Performed by: OBSTETRICS & GYNECOLOGY

## 2018-09-17 PROCEDURE — 80053 COMPREHEN METABOLIC PANEL: CPT

## 2018-09-17 PROCEDURE — 82570 ASSAY OF URINE CREATININE: CPT

## 2018-09-17 NOTE — PROGRESS NOTES
35 y o    female at [place] [id number] for PNV  BP : 122/74  TWG: -2    Feeling well  Mild nausea  Reviewed practice  Reviewed labs    SMITH/Ena collected    First pregnancy - presented to L+D complete and pushed for 3 mins    Hx of CHTN - never treated, b/w pregnancy - gave baseline preE labs, start ASA at 12weeks  Declines genetic screening  Referral for level 2 placed    F/u in 4 weeks

## 2018-09-17 NOTE — PROGRESS NOTES
Pt is here for first prenatal visit  Pt is due for GC/Ch testing, no pap  Pt lost some weight because she doesn't have much of an appetite but is eating small meals

## 2018-09-19 LAB
CHLAMYDIA DNA CVX QL NAA+PROBE: NORMAL
N GONORRHOEA DNA GENITAL QL NAA+PROBE: NORMAL

## 2018-09-23 DIAGNOSIS — G43.711 INTRACTABLE CHRONIC MIGRAINE WITHOUT AURA AND WITH STATUS MIGRAINOSUS: ICD-10-CM

## 2018-09-24 RX ORDER — PROPRANOLOL HYDROCHLORIDE 20 MG/1
TABLET ORAL
Qty: 60 TABLET | Refills: 3 | Status: SHIPPED | OUTPATIENT
Start: 2018-09-24 | End: 2019-03-04

## 2018-10-16 ENCOUNTER — ROUTINE PRENATAL (OUTPATIENT)
Dept: OBGYN CLINIC | Facility: CLINIC | Age: 33
End: 2018-10-16
Payer: COMMERCIAL

## 2018-10-16 VITALS — BODY MASS INDEX: 32.68 KG/M2 | DIASTOLIC BLOOD PRESSURE: 70 MMHG | SYSTOLIC BLOOD PRESSURE: 122 MMHG | WEIGHT: 196.4 LBS

## 2018-10-16 DIAGNOSIS — Z3A.15 15 WEEKS GESTATION OF PREGNANCY: ICD-10-CM

## 2018-10-16 DIAGNOSIS — Z23 NEED FOR INFLUENZA VACCINATION: Primary | ICD-10-CM

## 2018-10-16 PROCEDURE — 90471 IMMUNIZATION ADMIN: CPT | Performed by: OBSTETRICS & GYNECOLOGY

## 2018-10-16 PROCEDURE — 90686 IIV4 VACC NO PRSV 0.5 ML IM: CPT | Performed by: OBSTETRICS & GYNECOLOGY

## 2018-10-16 PROCEDURE — PNV: Performed by: OBSTETRICS & GYNECOLOGY

## 2018-10-16 NOTE — PROGRESS NOTES
This is a 35 y o   at 15w1d who presents for return OB visit  No complaints  Still having some nausea but improving  Denies cramping and bleeding  BP: 122/70 TW oz   Flu shot today  Level 2 US scheduled  CHTN: Has started LD ASA  Normotensive today  Baseline labs nl     Hx migraines: taking propranolol PRN (managed by neuro)

## 2018-11-12 ENCOUNTER — ROUTINE PRENATAL (OUTPATIENT)
Dept: OBGYN CLINIC | Facility: CLINIC | Age: 33
End: 2018-11-12

## 2018-11-12 VITALS — WEIGHT: 194 LBS | DIASTOLIC BLOOD PRESSURE: 60 MMHG | BODY MASS INDEX: 32.28 KG/M2 | SYSTOLIC BLOOD PRESSURE: 114 MMHG

## 2018-11-12 DIAGNOSIS — I10 ESSENTIAL HYPERTENSION: ICD-10-CM

## 2018-11-12 DIAGNOSIS — Z34.82 ENCOUNTER FOR SUPERVISION OF OTHER NORMAL PREGNANCY IN SECOND TRIMESTER: Primary | ICD-10-CM

## 2018-11-12 DIAGNOSIS — Z3A.19 19 WEEKS GESTATION OF PREGNANCY: ICD-10-CM

## 2018-11-12 PROCEDURE — PNV: Performed by: OBSTETRICS & GYNECOLOGY

## 2018-11-12 NOTE — PROGRESS NOTES
Has [facility] appt on Monday for 20 wk scan     C/o upper abdominal cramping after a stomach ache/vomiting on Saturday , is concerned because she has had a SAB before

## 2018-11-12 NOTE — PROGRESS NOTES
This is a 35 y o   at 19w0d who presents for return OB visit  Had 2 episodes of vomiting and cramping over the weekend after eating salad  No fever, diarrhea, chills  After 2nd episode of vomiting felt much better  Denies contractions, leakage, bleeding  Started feeling fetal movement recently  BP: 114/60 TWG: -2lb   CHTN: On low dose ASA   Normotensive  Has level 2 US scheduled for next week

## 2018-11-19 ENCOUNTER — ROUTINE PRENATAL (OUTPATIENT)
Dept: PERINATAL CARE | Facility: CLINIC | Age: 33
End: 2018-11-19
Payer: COMMERCIAL

## 2018-11-19 VITALS
HEART RATE: 86 BPM | DIASTOLIC BLOOD PRESSURE: 72 MMHG | HEIGHT: 66 IN | WEIGHT: 195.2 LBS | SYSTOLIC BLOOD PRESSURE: 107 MMHG | BODY MASS INDEX: 31.37 KG/M2

## 2018-11-19 DIAGNOSIS — Z3A.20 20 WEEKS GESTATION OF PREGNANCY: ICD-10-CM

## 2018-11-19 DIAGNOSIS — Z36.86 ENCOUNTER FOR ANTENATAL SCREENING FOR CERVICAL LENGTH: ICD-10-CM

## 2018-11-19 DIAGNOSIS — Z36.3 ENCOUNTER FOR ANTENATAL SCREENING FOR MALFORMATIONS: ICD-10-CM

## 2018-11-19 DIAGNOSIS — O10.912 MATERNAL CHRONIC HYPERTENSION, SECOND TRIMESTER: Primary | ICD-10-CM

## 2018-11-19 DIAGNOSIS — O99.212 OBESITY COMPLICATING PREGNANCY IN SECOND TRIMESTER: ICD-10-CM

## 2018-11-19 PROCEDURE — 76817 TRANSVAGINAL US OBSTETRIC: CPT | Performed by: OBSTETRICS & GYNECOLOGY

## 2018-11-19 PROCEDURE — 99201 PR OFFICE OUTPATIENT NEW 10 MINUTES: CPT | Performed by: OBSTETRICS & GYNECOLOGY

## 2018-11-19 PROCEDURE — 76805 OB US >/= 14 WKS SNGL FETUS: CPT | Performed by: OBSTETRICS & GYNECOLOGY

## 2018-11-19 NOTE — PROGRESS NOTES
A transvaginal ultrasound was performed  Sonographer note on use of High Level Disinfection Process (Trophon) for transvaginal probe# 1 used, serial [id number]    Jose Eduardo Kate

## 2018-11-19 NOTE — LETTER
November 22, 2018     DO Alfredito Paulino 67  [place] [address] [facility]    Patient: Elidia Rivera   YOB: 1985   Date of Visit: 11/19/2018       Dear Dr Iraida Horne:    Thank you for referring Elidia Rivera to me for evaluation  Below are my notes for this consultation  If you have questions, please do not hesitate to call me  I look forward to following your patient along with you  Sincerely,        Chiara Foley MD        CC: No Recipients  Chiara Foley MD  11/19/2018  3:03 PM  Sign at close encounter  Please refer to the [facility] ultrasound report in Ob Procedures for additional information regarding the [facility] the [facility]  today

## 2018-11-19 NOTE — PROGRESS NOTES
Please refer to the [facility] ultrasound report in Ob Procedures for additional information regarding the visit to the [facility]  today

## 2018-12-11 ENCOUNTER — ROUTINE PRENATAL (OUTPATIENT)
Dept: OBGYN CLINIC | Facility: CLINIC | Age: 33
End: 2018-12-11

## 2018-12-11 VITALS — BODY MASS INDEX: 31.64 KG/M2 | WEIGHT: 196 LBS | SYSTOLIC BLOOD PRESSURE: 104 MMHG | DIASTOLIC BLOOD PRESSURE: 60 MMHG

## 2018-12-11 DIAGNOSIS — Z34.82 ENCOUNTER FOR SUPERVISION OF OTHER NORMAL PREGNANCY IN SECOND TRIMESTER: Primary | ICD-10-CM

## 2018-12-11 DIAGNOSIS — Z3A.23 23 WEEKS GESTATION OF PREGNANCY: ICD-10-CM

## 2018-12-11 PROCEDURE — PNV: Performed by: OBSTETRICS & GYNECOLOGY

## 2018-12-11 NOTE — PROGRESS NOTES
Pt is complaining of vaginal pain on her left side that she would like looked at  She states she feels something on that side and it hurts to sit down

## 2018-12-11 NOTE — PROGRESS NOTES
35 y o    female at 23w1d [facility] for PNV  BP : 104/60  TWG: none  28 wk lab slips given  Pain on left side of labia  Started a week ago, has not gone away  Feels uncomfortable when she sits  Vaginal exam: no lesions or cysts in vagina or on labia  Patient unable to point to location of pain  Offered PT, patient declines  Has f/u at  in 3rd trimester

## 2018-12-31 ENCOUNTER — LAB (OUTPATIENT)
Dept: LAB | Facility: CLINIC | Age: 33
End: 2018-12-31
Payer: COMMERCIAL

## 2018-12-31 DIAGNOSIS — Z34.82 PRENATAL CARE, SUBSEQUENT PREGNANCY, SECOND TRIMESTER: Primary | ICD-10-CM

## 2018-12-31 LAB
BASOPHILS # BLD AUTO: 0.01 THOUSANDS/ΜL (ref 0–0.1)
BASOPHILS NFR BLD AUTO: 0 % (ref 0–1)
EOSINOPHIL # BLD AUTO: 0.09 THOUSAND/ΜL (ref 0–0.61)
EOSINOPHIL NFR BLD AUTO: 1 % (ref 0–6)
ERYTHROCYTE [DISTWIDTH] IN BLOOD BY AUTOMATED COUNT: 12.5 % (ref 11.6–15.1)
GLUCOSE 1H P 50 G GLC PO SERPL-MCNC: 119 MG/DL
HCT VFR BLD AUTO: 37.7 % (ref 34.8–46.1)
HGB BLD-MCNC: 12.3 G/DL (ref 11.5–15.4)
IMM GRANULOCYTES # BLD AUTO: 0.03 THOUSAND/UL (ref 0–0.2)
IMM GRANULOCYTES NFR BLD AUTO: 0 % (ref 0–2)
LYMPHOCYTES # BLD AUTO: 1.29 THOUSANDS/ΜL (ref 0.6–4.47)
LYMPHOCYTES NFR BLD AUTO: 15 % (ref 14–44)
MCH RBC QN AUTO: 29.2 PG (ref 26.8–34.3)
MCHC RBC AUTO-ENTMCNC: 32.6 G/DL (ref 31.4–37.4)
MCV RBC AUTO: 90 FL (ref 82–98)
MONOCYTES # BLD AUTO: 0.37 THOUSAND/ΜL (ref 0.17–1.22)
MONOCYTES NFR BLD AUTO: 4 % (ref 4–12)
NEUTROPHILS # BLD AUTO: 7.13 THOUSANDS/ΜL (ref 1.85–7.62)
NEUTS SEG NFR BLD AUTO: 80 % (ref 43–75)
NRBC BLD AUTO-RTO: 0 /100 WBCS
PLATELET # BLD AUTO: 221 THOUSANDS/UL (ref 149–390)
PMV BLD AUTO: 9.6 FL (ref 8.9–12.7)
RBC # BLD AUTO: 4.21 MILLION/UL (ref 3.81–5.12)
WBC # BLD AUTO: 8.92 THOUSAND/UL (ref 4.31–10.16)

## 2018-12-31 PROCEDURE — 85025 COMPLETE CBC W/AUTO DIFF WBC: CPT

## 2018-12-31 PROCEDURE — 86592 SYPHILIS TEST NON-TREP QUAL: CPT

## 2018-12-31 PROCEDURE — 36415 COLL VENOUS BLD VENIPUNCTURE: CPT

## 2018-12-31 PROCEDURE — 82950 GLUCOSE TEST: CPT

## 2019-01-02 LAB — RPR SER QL: NORMAL

## 2019-01-08 ENCOUNTER — ROUTINE PRENATAL (OUTPATIENT)
Dept: OBGYN CLINIC | Facility: CLINIC | Age: 34
End: 2019-01-08
Payer: COMMERCIAL

## 2019-01-08 VITALS — SYSTOLIC BLOOD PRESSURE: 112 MMHG | BODY MASS INDEX: 32.25 KG/M2 | DIASTOLIC BLOOD PRESSURE: 64 MMHG | WEIGHT: 199.8 LBS

## 2019-01-08 DIAGNOSIS — Z3A.27 27 WEEKS GESTATION OF PREGNANCY: ICD-10-CM

## 2019-01-08 DIAGNOSIS — Z34.82 ENCOUNTER FOR SUPERVISION OF OTHER NORMAL PREGNANCY IN SECOND TRIMESTER: ICD-10-CM

## 2019-01-08 DIAGNOSIS — I10 ESSENTIAL HYPERTENSION: ICD-10-CM

## 2019-01-08 DIAGNOSIS — Z23 NEED FOR TDAP VACCINATION: ICD-10-CM

## 2019-01-08 PROCEDURE — 90471 IMMUNIZATION ADMIN: CPT | Performed by: OBSTETRICS & GYNECOLOGY

## 2019-01-08 PROCEDURE — PNV: Performed by: OBSTETRICS & GYNECOLOGY

## 2019-01-08 PROCEDURE — 90715 TDAP VACCINE 7 YRS/> IM: CPT | Performed by: OBSTETRICS & GYNECOLOGY

## 2019-01-08 PROCEDURE — 90472 IMMUNIZATION ADMIN EACH ADD: CPT | Performed by: OBSTETRICS & GYNECOLOGY

## 2019-01-08 NOTE — PROGRESS NOTES
This is a 35 y o   at 27w1d who presents for return OB visit  No complaints  Denies contractions, leakage, bleeding  Endorses fetal movement  BP: 112/64 TWG: 3lb   28 wk labs reviewed  TDAP offered and accepted  Rhogam not indicated  [address] reviewed  Baby & Me booklet given and reviewed  Skin to skin, rooming in, delayed cord clamp,  pain management in labor discussed  Consent signed  Full code  OK with blood transfusion    CHTN: LD ASA, has US with MFM next week

## 2019-01-15 ENCOUNTER — ULTRASOUND (OUTPATIENT)
Dept: PERINATAL CARE | Facility: CLINIC | Age: 34
End: 2019-01-15
Payer: COMMERCIAL

## 2019-01-15 VITALS
WEIGHT: 200.2 LBS | HEIGHT: 66 IN | DIASTOLIC BLOOD PRESSURE: 80 MMHG | SYSTOLIC BLOOD PRESSURE: 122 MMHG | BODY MASS INDEX: 32.17 KG/M2

## 2019-01-15 DIAGNOSIS — Z3A.28 28 WEEKS GESTATION OF PREGNANCY: ICD-10-CM

## 2019-01-15 DIAGNOSIS — O99.213 OBESITY COMPLICATING PREGNANCY, THIRD TRIMESTER: ICD-10-CM

## 2019-01-15 DIAGNOSIS — O10.913 MATERNAL CHRONIC HYPERTENSION, THIRD TRIMESTER: Primary | ICD-10-CM

## 2019-01-15 PROCEDURE — 76816 OB US FOLLOW-UP PER FETUS: CPT | Performed by: OBSTETRICS & GYNECOLOGY

## 2019-01-15 PROCEDURE — 99212 OFFICE O/P EST SF 10 MIN: CPT | Performed by: OBSTETRICS & GYNECOLOGY

## 2019-01-15 NOTE — PROGRESS NOTES
Please refer to the [facility] ultrasound report in Ob Procedures for additional information regarding the visit to the [facility]  today

## 2019-01-15 NOTE — LETTER
January 15, 2019     DO Alfredito Lynn 67  [place] [address] [facility]    Patient: Moraima Treviño   YOB: 1985   Date of Visit: 1/15/2019       Dear Dr César Ty:    Thank you for referring Moraima Treviño to me for evaluation  Below are my notes for this consultation  If you have questions, please do not hesitate to call me  I look forward to following your patient along with you  Sincerely,        Cathleen De La Garza MD        CC: No Recipients  Cathleen De La Garza MD  1/15/2019  3:32 PM  Sign at close encounter  Please refer to the [facility] ultrasound report in Ob Procedures for additional information regarding the visit to the [facility]  today

## 2019-01-21 ENCOUNTER — ROUTINE PRENATAL (OUTPATIENT)
Dept: OBGYN CLINIC | Facility: CLINIC | Age: 34
End: 2019-01-21

## 2019-01-21 VITALS — WEIGHT: 200 LBS | DIASTOLIC BLOOD PRESSURE: 64 MMHG | BODY MASS INDEX: 32.28 KG/M2 | SYSTOLIC BLOOD PRESSURE: 112 MMHG

## 2019-01-21 DIAGNOSIS — Z34.83 ENCOUNTER FOR SUPERVISION OF OTHER NORMAL PREGNANCY IN THIRD TRIMESTER: ICD-10-CM

## 2019-01-21 DIAGNOSIS — Z3A.29 29 WEEKS GESTATION OF PREGNANCY: Primary | ICD-10-CM

## 2019-01-21 PROCEDURE — PNV: Performed by: OBSTETRICS & GYNECOLOGY

## 2019-01-21 NOTE — PROGRESS NOTES
Rianna Morris is a 35y o  year old  at 29w0d for routine prenatal visit  BP: 112/64 TWlb  FKC reviewed  No current complaints  Will schedule checking in visit  Birth plan signed today  Will have 10year old son at the delivery, and he will have a support person  Proven pelvis to [id number]  Pushed for 10 minutes  Went into labor at 42 weeks  chtn- not on meds  No elevated bp in this pregnancy  Had two elevated bp prior to pregnancy which normalized without meds  On LDASA   Has repeat growth @ 36 weeks

## 2019-02-04 ENCOUNTER — ROUTINE PRENATAL (OUTPATIENT)
Dept: OBGYN CLINIC | Facility: CLINIC | Age: 34
End: 2019-02-04

## 2019-02-04 VITALS — SYSTOLIC BLOOD PRESSURE: 116 MMHG | WEIGHT: 199.6 LBS | DIASTOLIC BLOOD PRESSURE: 64 MMHG | BODY MASS INDEX: 32.22 KG/M2

## 2019-02-04 DIAGNOSIS — I10 ESSENTIAL HYPERTENSION: ICD-10-CM

## 2019-02-04 DIAGNOSIS — Z3A.31 31 WEEKS GESTATION OF PREGNANCY: Primary | ICD-10-CM

## 2019-02-04 DIAGNOSIS — Z34.83 ENCOUNTER FOR SUPERVISION OF OTHER NORMAL PREGNANCY IN THIRD TRIMESTER: ICD-10-CM

## 2019-02-04 PROCEDURE — PNV: Performed by: OBSTETRICS & GYNECOLOGY

## 2019-02-04 NOTE — PROGRESS NOTES
This is a 35 y o   at 31w0d who presents for return OB visit  No complaints  Denies contractions, leakage, bleeding  Endorses fetal movement  BP: 116/64 TWG: 3lb   CHTN: Normotensive  Continue LD ASA  Has growth US at 36 wks  Patient plans to breastfeed    Plans for contraception: Progestin only pills

## 2019-02-19 ENCOUNTER — ROUTINE PRENATAL (OUTPATIENT)
Dept: OBGYN CLINIC | Facility: CLINIC | Age: 34
End: 2019-02-19

## 2019-02-19 VITALS — BODY MASS INDEX: 33.25 KG/M2 | DIASTOLIC BLOOD PRESSURE: 60 MMHG | WEIGHT: 206 LBS | SYSTOLIC BLOOD PRESSURE: 114 MMHG

## 2019-02-19 DIAGNOSIS — Z34.83 ENCOUNTER FOR SUPERVISION OF OTHER NORMAL PREGNANCY IN THIRD TRIMESTER: ICD-10-CM

## 2019-02-19 DIAGNOSIS — I10 ESSENTIAL HYPERTENSION: Primary | ICD-10-CM

## 2019-02-19 DIAGNOSIS — Z3A.33 33 WEEKS GESTATION OF PREGNANCY: ICD-10-CM

## 2019-02-19 PROCEDURE — PNV: Performed by: OBSTETRICS & GYNECOLOGY

## 2019-02-19 NOTE — PROGRESS NOTES
This is a 35 y o   at 33w1d who presents for return OB visit  No complaints  Denies contractions, leakage, bleeding  Endorses fetal movement  BP: 114/60 TWG: 10lb  CHTN: Normotensive  On LD ASA daily     Has US scheduled for 36 wk growth

## 2019-03-04 ENCOUNTER — ROUTINE PRENATAL (OUTPATIENT)
Dept: OBGYN CLINIC | Facility: CLINIC | Age: 34
End: 2019-03-04

## 2019-03-04 VITALS — DIASTOLIC BLOOD PRESSURE: 70 MMHG | SYSTOLIC BLOOD PRESSURE: 116 MMHG | BODY MASS INDEX: 32.93 KG/M2 | WEIGHT: 204 LBS

## 2019-03-04 DIAGNOSIS — Z34.03 ENCOUNTER FOR SUPERVISION OF NORMAL FIRST PREGNANCY IN THIRD TRIMESTER: Primary | ICD-10-CM

## 2019-03-04 DIAGNOSIS — Z3A.35 35 WEEKS GESTATION OF PREGNANCY: ICD-10-CM

## 2019-03-04 PROCEDURE — PNV: Performed by: OBSTETRICS & GYNECOLOGY

## 2019-03-04 NOTE — PROGRESS NOTES
35 y o    female at [address] [facility] [address] for PNV  BP : 116/70  TW  Feeling well  Some back pain  No leaking or bleeding  Baby moving well    No contractions  Growth at [facility] next week  GBS next visit

## 2019-03-04 NOTE — PROGRESS NOTES
Pt is tired, and having lower back pain  Pt having contractions and pressure, but nothing consistent

## 2019-03-12 ENCOUNTER — ULTRASOUND (OUTPATIENT)
Dept: PERINATAL CARE | Facility: CLINIC | Age: 34
End: 2019-03-12
Payer: COMMERCIAL

## 2019-03-12 VITALS
HEIGHT: 65 IN | BODY MASS INDEX: 34.66 KG/M2 | HEART RATE: 80 BPM | DIASTOLIC BLOOD PRESSURE: 80 MMHG | SYSTOLIC BLOOD PRESSURE: 114 MMHG | WEIGHT: 208 LBS

## 2019-03-12 DIAGNOSIS — Z36.89 ENCOUNTER FOR ULTRASOUND TO CHECK FETAL GROWTH: ICD-10-CM

## 2019-03-12 DIAGNOSIS — Z3A.36 36 WEEKS GESTATION OF PREGNANCY: ICD-10-CM

## 2019-03-12 DIAGNOSIS — O99.213 OBESITY AFFECTING PREGNANCY IN THIRD TRIMESTER: Primary | ICD-10-CM

## 2019-03-12 PROCEDURE — 76816 OB US FOLLOW-UP PER FETUS: CPT | Performed by: OBSTETRICS & GYNECOLOGY

## 2019-03-12 PROCEDURE — PNV: Performed by: OBSTETRICS & GYNECOLOGY

## 2019-03-12 NOTE — PROGRESS NOTES
[address] [address]: Ms Leonard Galindo was seen today at 36w1d for fetal growth assessment ultrasound  See ultrasound report under "OB Procedures" tab  Please don't hesitate to contact our office with any concerns or questions    Terra Dyer MD

## 2019-03-12 NOTE — PATIENT INSTRUCTIONS
Thank you for choosing [id number] [facility] for your  care today  If you have any questions about your ultrasound or care, please do not hesitate to contact us or your primary obstetrician  At this time, no additional ultrasounds are advised through the  center, however, if your doctors would like you to have any additional ultrasounds, they will let us know  Here is a handout on preeclampsia:    Preeclampsia   WHAT YOU NEED TO KNOW:   What is preeclampsia? Preeclampsia is a condition that can develop during week 20 or later of your pregnancy  Preeclampsia means you have high blood pressure and may have protein in your urine  Preeclampsia can cause mild to life-threatening health problems for you and your unborn baby  What are the signs and symptoms of preeclampsia? You may not have any symptoms  Severe preeclampsia may cause any of the following symptoms:  · Swollen face and hands    · A sudden weight gain of 5 pounds or more    · Headache    · Spotted or blurred vision     · Pain in your upper abdomen  What increases my risk for preeclampsia? · First pregnancy    · Pregnant with twins or multiples    · Personal or family history of preeclampsia or eclampsia    · Overweight    · Diabetes, high blood pressure, or kidney disease    · Age older than 40 years  How is preeclampsia diagnosed? · A blood pressure  of 140/90 mmHg or more for at least 2 readings may mean you have preeclampsia  Your blood pressure will need to be checked 1 to 2 times a week until your baby is born  · Blood tests  are done to check your liver and kidney function  You may need blood tests every week while you are pregnant  · Urine tests  are used to check for protein  You may need to give healthcare providers a urine sample at each visit  You may also need to collect your urine every time you urinate for 24 hours  How will my unborn baby be monitored?   You may need to keep track of how often your baby moves or kicks over a certain amount of time  Ask your healthcare provider how to do kick counts and how often to do them  You may also need the following tests at each visit until your baby is born:  · A fetal biophysical profile  combines a nonstress test and an ultrasound of your unborn baby  The nonstress test measures changes in your baby's heartbeat when he moves  The ultrasound will show your baby's movement, growth, and how his breathing muscles are working  Healthcare providers can check the amount of fluid around your baby  The ultrasound will also show how well your baby's lungs are working  · An umbilical cord Doppler  checks blood flow through the umbilical cord  How is preeclampsia treated? · Medicines  may be given to lower your blood pressure, protect your organs, or prevent seizures  Low doses of aspirin after 12 weeks of pregnancy may be recommended if you are at high risk for preeclampsia  Aspirin may help prevent preeclampsia or problems that can happen from preeclampsia  Do not take aspirin unless directed by your healthcare provider  · Rest  as directed  Your healthcare provider may tell you to rest more often if you have mild symptoms of preeclampsia  Lie on your left side as often as you can  You may need complete bedrest if you have more severe symptoms  You may need to be in the hospital if your condition worsens  · Delivery  usually stops preeclampsia  Healthcare providers may deliver your baby right away if he is full-term (37 weeks or more)  He may need to be delivered early if you or the baby has life-threatening symptoms  What are the risks of preeclampsia? Your baby may not grow as he should and may need to be delivered early  Placental abruption can occur if the placenta pulls away from the uterus too soon  This condition is life-threatening for your baby   High blood pressure that is not controlled can lead to blood clots, kidney or liver failure, or stroke  Severe preeclampsia can cause seizures or coma  This condition is called eclampsia  Eclampsia is a life-threatening condition for you and your unborn baby  Call 911 for any of the following:   · You have a seizure  · You have severe abdominal pain with nausea and vomiting  When should I seek immediate care? · You develop a severe headache that does not go away  · You have blurred or spotted vision that does not go away  · You are bleeding from your vagina  · You have new or increased swelling in your face or hands  · You are urinating little or not at all  When should I contact my healthcare provider? · You are urinating less than usual      · You do not feel your baby's movements as often as usual     · You have questions or concerns about your condition or care  CARE AGREEMENT:   You have the right to help plan your care  Learn about your health condition and how it may be treated  Discuss treatment options with your caregivers to decide what care you want to receive  You always have the right to refuse treatment  The above information is an  only  It is not intended as medical advice for individual conditions or treatments  Talk to your doctor, nurse or pharmacist before following any medical regimen to see if it is safe and effective for you  © 2017 [employer]  Information is for End User's use only and may not be sold, redistributed or otherwise used for commercial purposes  All illustrations and images included in CareNotes® are the copyrighted property of A D A M [employer] Inc  or [employer]rel Bey

## 2019-03-19 ENCOUNTER — ROUTINE PRENATAL (OUTPATIENT)
Dept: OBGYN CLINIC | Facility: CLINIC | Age: 34
End: 2019-03-19

## 2019-03-19 VITALS — WEIGHT: 209.6 LBS | BODY MASS INDEX: 34.88 KG/M2 | DIASTOLIC BLOOD PRESSURE: 74 MMHG | SYSTOLIC BLOOD PRESSURE: 118 MMHG

## 2019-03-19 DIAGNOSIS — Z3A.37 37 WEEKS GESTATION OF PREGNANCY: ICD-10-CM

## 2019-03-19 DIAGNOSIS — Z34.83 ENCOUNTER FOR SUPERVISION OF OTHER NORMAL PREGNANCY IN THIRD TRIMESTER: Primary | ICD-10-CM

## 2019-03-19 LAB — EXTERNAL GROUP B STREP ANTIGEN: NEGATIVE

## 2019-03-19 PROCEDURE — PNV: Performed by: OBSTETRICS & GYNECOLOGY

## 2019-03-19 NOTE — PROGRESS NOTES
This is a 35 y o   at 42w4d who presents for return OB visit  No complaints  Denies contractions, leakage, bleeding  Endorses fetal movement  BP: 118/74 TW   GBS done: Yes  PCN allergy: No  Labor precautions given  [address] reviewed     US on 3/12:  EFW (Ac/Fl/Hc)  2717 grams - 5 lbs 15 oz  (35%)

## 2019-03-23 ENCOUNTER — HOSPITAL ENCOUNTER (OUTPATIENT)
Facility: HOSPITAL | Age: 34
Discharge: HOME | End: 2019-03-23
Attending: OBSTETRICS & GYNECOLOGY | Admitting: OBSTETRICS & GYNECOLOGY
Payer: COMMERCIAL

## 2019-03-23 VITALS
TEMPERATURE: 98.2 F | HEART RATE: 105 BPM | RESPIRATION RATE: 16 BRPM | BODY MASS INDEX: 34.82 KG/M2 | SYSTOLIC BLOOD PRESSURE: 123 MMHG | HEIGHT: 65 IN | WEIGHT: 209 LBS | OXYGEN SATURATION: 98 % | DIASTOLIC BLOOD PRESSURE: 83 MMHG

## 2019-03-23 PROCEDURE — 99214 OFFICE O/P EST MOD 30 MIN: CPT

## 2019-03-23 RX ORDER — ACETAMINOPHEN 325 MG/1
975 TABLET ORAL EVERY 6 HOURS PRN
Status: DISCONTINUED | OUTPATIENT
Start: 2019-03-23 | End: 2019-03-24 | Stop reason: HOSPADM

## 2019-03-23 RX ADMIN — ACETAMINOPHEN 975 MG: 325 TABLET ORAL at 22:41

## 2019-03-24 NOTE — DISCHARGE INSTRUCTIONS
El embarazo de la semana 35 a la 38   LO QUE NECESITA SABER:   Al principio de las 37 semanas se considera que usted está en término completo  Podría ser mas fácil que usted respire si alston bebé se ha posicionado con la jerod hacia abajo  Es posible que usted necesite orinar con mayor frecuencia ya que el bebé podría estar presionando alston vejiga  Usted también podría sentir más molestias y cansarse fácilmente  INSTRUCCIONES SOBRE EL ADAL HOSPITALARIA:   Busque atención médica de inmediato si:   · Usted presenta un aura dolor de jerod que no desaparece  · Usted tiene cambios en la visión nuevos o en aumento, kimberlee visión borrosa o con manchas  · Usted tiene inflamación nueva o creciente en alston tarah o kirill  · Usted tiene manchado o sangrado vaginal     · Usted rompe william o siente un chorro o gotas de agua tibia que le está bajando por alston vagina  Pregúntele a alston [place] vitaminas y minerales son adecuados para usted  · Usted tiene más de 5 contracciones en 1 hora  · Usted nota algún cambio en los movimientos de alston bebé  · Usted tiene calambres, presión o tensión abdominal     · Usted tiene un cambio en la secreción vaginal     · Usted tiene escalofríos o fiebre  · Usted tiene comezón, ardor o dolor vaginal      · Usted tiene matthieu secreción vaginal amarillenta, verdosa, loly o de [employer]  · Usted tiene dolor o ardor al Aj Hiss, orina menos de lo habitual o tiene [place] rosada o sanguinolenta  · Usted tiene preguntas o inquietudes acerca de alston condición o cuidado  Cómo cuidarse en esta etapa de alston embarazo:   · Consuma alimentos saludables y variados  Alimentos saludables incluyen frutas, verduras, panes de moni integral, alimentos lácteos bajos en grasa, frijoles, laurent magras y pescado  Neche líquidos kimberlee se le haya indicado  Pregunte cuánto líquido debe nathan cada día y cuáles líquidos son los más adecuados para usted   Limite el consumo de cafeína a menos de 200 miligramos cada día  Limite el consumo de pescado a 2 porciones cada semana  Escoja pescado con concentraciones bajas de omar kimberlee atún al natural enlatado, camarón, salmón, bacalao o tilapia  No  coma pescado con concentraciones altas de omar kimberlee pez jeremias, caballa gigante, pargo rayado y tiburón  · [address] [place]  Alston necesidad de ciertas vitaminas y [address], kimberlee el ácido fólico, aumenta shante el [facility]  Las vitaminas prenatales proporcionan algunas de las vitaminas y minerales adicionales que usted necesita  Las vitaminas prenatales también podrían ayudar a disminuir el riesgo de ciertos defectos de nacimiento  · Descanse tanto kimberlee sea necesario  Levante gayathri pies si tiene inflamación en gayathri tobillos y pies  · No fume  Si usted fuma, nunca es demasiado tarde para dejar de hacerlo  Fumar aumenta el riesgo de aborto espontáneo y otros problemas de ammy shante alston [facility] [place]  Fumar puede causar que alston bebé nazca antes de tiempo o que pese menos al nacer  Solicite información a alston médico si usted necesita ayuda para dejar de fumar  · No consuma alcohol  El alcohol pasa de alston cuerpo al bebé a través de la placenta  Puede afectar el desarrollo del cerebro de alston bebé y provocar el síndrome de alcoholismo fetal (SAF)  FAS es un kelly de condiciones que causan [address], de comportamiento y de crecimiento  · Consulte con alston médico antes de nathan cualquier medicamento  Muchos medicamentos pueden perjudicar a alston bebé si usted los jonathan [facility] [address]  No tome ningún medicamento, vitaminas, hierbas o suplementos sin vazquez consultar con alston [place]  use drogas ilegales o de la coombs (kimberlee marihuana o cocaína) mientras está embarazada  · Hable con alston médico antes de viajar  Es posible que no pueda viajar en avión después de las 36 [facility]  También le puede recomendar que evite largos viajes por carretera    Consejos de seguridad:   · Evite jacuzzis y saunas  No use un jacuzzi o un sauna mientras usted está embarazada, especialmente shante el primer trimestre  [facility] y los saunas aumentan la temperatura de alston bebé y el riesgo de defectos de nacimiento  · Evite la toxoplasmosis  El Jebel es matthieu infección causada por comer carne cruda o estar cerca del excremento de un tarn infectado  El Jebel puede causar malformaciones congénitas, aborto espontáneo y [place]  Lávese las kirill después de tocar carne cruda  Asegúrese de que la carne esté arnie cocida antes de comerla  Evite los huevos crudos y la [place]  Use guantes o pida que alguien la ayude a limpiar la caja de arena del tran mientras usted [employer]  · Consulte con alston médico acerca de viajar  El [address] cómodo para viajar es shante el lorin trimestre  Pregunte a alston médico si usted puede viajar después de las 36 semanas  Es posible que no pueda viajar en avión después de las 36 [facility]  También le puede recomendar que evite largos viajes por carretera  Cambios que están ocurriendo con alston bebé:  Para las 38 semanas, alston bebé podría pesar entre 6 y 5 libras  Alston bebé podría medir alrededor de 14 pulgadas de clayton desde la punta de la jerod hasta la rabadilla (parte inferior del bebé)  Alston bebé escucha lo suficientemente arnie kimberlee para reconocer alston voz  Conforme alston bebé crece más, usted podría sentir menos patadas y sentir más que alston bebé se estira y [place]  Alston bebé podría moverse en posición con la jerod hacia abajo  Alston bebé también descansará en la parte inferior de alston abdomen  Lo que necesita saber acerca del cuidado prenatal:  Alston médico le revisará alston presión arterial y [place]  Es posible que también necesite lo siguiente:  · Un examen de orina  también podría realizarse para revisarle el azúcar y la proteína  Estas son señales de diabetes gestacional o de infección  La proteína en alston orina también podría ser matthieu señal de preeclampsia   Virgilio Bee preeclampsia es matthieu condición que puede desarrollarse shante la semana 21 o después en deras embarazo  Esta provoca presión arterial hollie y [employer] con gayathri riñones y [place]  · Los análisis de nika  se pueden realizar para revisar si tiene signos de anemia (nivel bajo del jd)  · La vacuna Tdap  podría ser recomendado por deras médico      · El examen del estreptococo del kelly B  es un examen que se realiza para verificar si hay infección por estreptococos del kelly B  El estreptococo del kelly B es un tipo de bacteria que se puede encontrar en la vagina o el recto  Podría ser transmitida a deras bebé shante el parto si usted la tiene  Deras médico podría nathan Erven Veronica de deras vagina o recto y terrence la [employer] [place] al laboratorio para que la examinen  · La altura uterina  es matthieu medición del útero para controlar el desarrollo de deras bebé  [employer] por lo general es igual al número de [facility] que usted tiene de embarazo  Deras médico también podría revisar la posición de deras bebé  · El ritmo cardíaco de deras bebé  será revisado  © 2017 [address] Information is for End User's use only and may not be sold, redistributed or otherwise used for commercial purposes  All illustrations and images included in CareNotes® are the copyrighted property of A D A M , Inc  or Jerel Bey  Esta información es sólo para uso en educación  Deras intención no es darle un consejo médico sobre enfermedades o tratamientos  Colsulte con deras [place] farmacéutico antes de seguir cualquier régimen médico para saber si es seguro y efectivo para usted

## 2019-03-24 NOTE — PROGRESS NOTES
L&D Triage Note - OB/GYN  Vangie Dickey 35 y o  female MRN: [id number]  Unit/Bed#: LD Triage  Encounter: [id number]    Patient is seen by [facility]  _________________________________________________________  ASSESSMENT:  _________________________________________________________  Vangie Dickey is a 35 y o   at 37w5d r/o labor  _________________________________________________________  PLAN:  _________________________________________________________  1) R/o labor   - SVE 3/50/-2 and unchanged from exam in office Tuesday   - Pt visibly comfortable, difficult to  contractions secondary to body habitus, q3-5min approximately   - Pt lives 20min away, offered 2hr recheck which pt declined  2) Continue routine prenatal care   - Next appt 3/28/19  3) Discharge from [facility] triage with term labor precautions   - Case discussed with Dr Peri Collins, reviewed false vs true labor, to call attending provider if any concerns arise, decreased fetal movement, vaginal bleeding, and leakage of fluid, pt verbalized understanding    Future Appointments   Date Time Provider Carolin Rojas   3/28/2019  1:30 PM DO JOJO Barajas  Practice-[facility]om   2019  1:15 PM SHIMON Terrell [facility]     _________________________________________________________  SUBJECTIVE:  _________________________________________________________  Vangie Keli 35 y o  [id number] at 37w5d with an Estimated Date of Delivery: 19 who presents with contractions that started at 1700 and became increasingly more intense  Per patient, q5min      Her current obstetrical history is significant for obesity    Her past obstetrical history is significant for a precipitous term  wherein she presented to L&D at 10cm after laboring at home and had her baby without labs drawn    Contractions: present  Leakage of fluid: none  Vaginal Bleeding: none  Fetal movement: present  _________________________________________________________  OBJECTIVE:  _________________________________________________________  Vitals:    03/23/19 2153   BP: 123/83   Pulse: 105   Resp: 16   Temp: 98 2 °F (36 8 °C)   SpO2:      ROS"  Constitutional: Negative  Respiratory: Negative  Cardiovascular: Negative    Gastrointestinal: Negative    General Physical Exam:  General: in no apparent distress, well developed and well nourished and non-toxic  Cardiovascular: Cor RRR  Lungs: non-labored breathing, CTAB  Abdomen: abdomen is soft without significant tenderness, masses, organomegaly or guarding  Lower extremeties: nontender    Cervical Exam  SVE: 3 / 50% / -2    Fetal monitoring:  FHT:  125 bpm/ Moderate 6 - 25 bpm / reactive 15x15 accelerations, no decelerations  [place]: q3-5min, difficult to trace, pt very comfortable during interview     Richard Gibbs DO  PGY-2 OB/GYN Resident   3/23/2019 10:56 PM

## 2019-03-28 ENCOUNTER — ROUTINE PRENATAL (OUTPATIENT)
Dept: OBGYN CLINIC | Facility: CLINIC | Age: 34
End: 2019-03-28

## 2019-03-28 VITALS — BODY MASS INDEX: 34.78 KG/M2 | DIASTOLIC BLOOD PRESSURE: 64 MMHG | SYSTOLIC BLOOD PRESSURE: 124 MMHG | WEIGHT: 209 LBS

## 2019-03-28 DIAGNOSIS — Z3A.38 38 WEEKS GESTATION OF PREGNANCY: ICD-10-CM

## 2019-03-28 DIAGNOSIS — Z34.03 ENCOUNTER FOR SUPERVISION OF NORMAL FIRST PREGNANCY IN THIRD TRIMESTER: Primary | ICD-10-CM

## 2019-03-28 PROCEDURE — PNV: Performed by: OBSTETRICS & GYNECOLOGY

## 2019-03-28 NOTE — PROGRESS NOTES
C/o maryann leung contractions that are becoming more consistent since at L+D triage   C/o stomach tightening, especially at night time   C/o vaginal pressure     Could not provide urine sample at time of vitals

## 2019-03-28 NOTE — PROGRESS NOTES
This is a 35 y o   at 38w3d who presents for return OB visit  Cont to have irregular contractions, nothing consistent  Denies bleeding, leakage of fluid   Endorses fetal movement  BP: 124/64 TWlb  GBS neg  SVE 50/-2 (was 3cm in triage over weekend)  Discussed labor precautions

## 2019-03-31 ENCOUNTER — HOSPITAL ENCOUNTER (INPATIENT)
Facility: HOSPITAL | Age: 34
LOS: 1 days | Discharge: HOME | End: 2019-04-01
Attending: OBSTETRICS & GYNECOLOGY | Admitting: OBSTETRICS & GYNECOLOGY
Payer: COMMERCIAL

## 2019-03-31 PROBLEM — Z34.90 SUPERVISION OF NORMAL PREGNANCY: Status: RESOLVED | Noted: 2018-09-17 | Resolved: 2019-03-31

## 2019-03-31 PROBLEM — Z3A.38 38 WEEKS GESTATION OF PREGNANCY: Status: RESOLVED | Noted: 2018-08-07 | Resolved: 2019-03-31

## 2019-03-31 LAB
ABO GROUP BLD: NORMAL
ALBUMIN SERPL BCP-MCNC: 2.7 G/DL (ref 3.5–5)
ALP SERPL-CCNC: 165 U/L (ref 46–116)
ALT SERPL W P-5'-P-CCNC: 21 U/L (ref 12–78)
ANION GAP SERPL CALCULATED.3IONS-SCNC: 7 MMOL/L (ref 4–13)
AST SERPL W P-5'-P-CCNC: 27 U/L (ref 5–45)
BASE EXCESS BLDCOA CALC-SCNC: -0.5 MMOL/L (ref 3–11)
BASE EXCESS BLDCOV CALC-SCNC: -1.9 MMOL/L (ref 1–9)
BILIRUB SERPL-MCNC: 0.49 MG/DL (ref 0.2–1)
BLD GP AB SCN SERPL QL: NEGATIVE
BUN SERPL-MCNC: 6 MG/DL (ref 5–25)
CALCIUM SERPL-MCNC: 8.3 MG/DL (ref 8.3–10.1)
CHLORIDE SERPL-SCNC: 109 MMOL/L (ref 100–108)
CO2 SERPL-SCNC: 19 MMOL/L (ref 21–32)
CREAT SERPL-MCNC: 0.48 MG/DL (ref 0.6–1.3)
CREAT UR-MCNC: 116 MG/DL
ERYTHROCYTE [DISTWIDTH] IN BLOOD BY AUTOMATED COUNT: 12.5 % (ref 11.6–15.1)
GFR SERPL CREATININE-BSD FRML MDRD: 129 ML/MIN/1.73SQ M
GLUCOSE SERPL-MCNC: 84 MG/DL (ref 65–140)
HCO3 BLDCOA-SCNC: 23.3 MMOL/L (ref 17.3–27.3)
HCO3 BLDCOV-SCNC: 18.7 MMOL/L (ref 12.2–28.6)
HCT VFR BLD AUTO: 38 % (ref 34.8–46.1)
HGB BLD-MCNC: 13.1 G/DL (ref 11.5–15.4)
MCH RBC QN AUTO: 29.5 PG (ref 26.8–34.3)
MCHC RBC AUTO-ENTMCNC: 34.5 G/DL (ref 31.4–37.4)
MCV RBC AUTO: 86 FL (ref 82–98)
O2 CT VFR BLDCOA CALC: 11.9 ML/DL
OXYHGB MFR BLDCOA: 59.9 %
OXYHGB MFR BLDCOV: 90.1 %
PCO2 BLDCOA: 35.8 MM[HG] (ref 30–60)
PCO2 BLDCOV: 22.7 MM HG (ref 27–43)
PH BLDCOA: 7.43 [PH] (ref 7.23–7.43)
PH BLDCOV: 7.53 [PH] (ref 7.19–7.49)
PLATELET # BLD AUTO: 173 THOUSANDS/UL (ref 149–390)
PMV BLD AUTO: 9.9 FL (ref 8.9–12.7)
PO2 BLDCOA: 24.1 MM HG (ref 5–25)
PO2 BLDCOV: 41.6 MM HG (ref 15–45)
POTASSIUM SERPL-SCNC: 3.8 MMOL/L (ref 3.5–5.3)
PROT SERPL-MCNC: 7 G/DL (ref 6.4–8.2)
PROT UR-MCNC: 27 MG/DL
PROT/CREAT UR: 0.23 MG/G{CREAT} (ref 0–0.1)
RBC # BLD AUTO: 4.44 MILLION/UL (ref 3.81–5.12)
RH BLD: POSITIVE
SAO2 % BLDCOV: 17.8 ML/DL
SODIUM SERPL-SCNC: 135 MMOL/L (ref 136–145)
SPECIMEN EXPIRATION DATE: NORMAL
WBC # BLD AUTO: 10.72 THOUSAND/UL (ref 4.31–10.16)

## 2019-03-31 PROCEDURE — 85027 COMPLETE CBC AUTOMATED: CPT | Performed by: OBSTETRICS & GYNECOLOGY

## 2019-03-31 PROCEDURE — 86592 SYPHILIS TEST NON-TREP QUAL: CPT | Performed by: OBSTETRICS & GYNECOLOGY

## 2019-03-31 PROCEDURE — 86901 BLOOD TYPING SEROLOGIC RH(D): CPT | Performed by: OBSTETRICS & GYNECOLOGY

## 2019-03-31 PROCEDURE — 82805 BLOOD GASES W/O2 SATURATION: CPT | Performed by: OBSTETRICS & GYNECOLOGY

## 2019-03-31 PROCEDURE — 80053 COMPREHEN METABOLIC PANEL: CPT | Performed by: OBSTETRICS & GYNECOLOGY

## 2019-03-31 PROCEDURE — 82570 ASSAY OF URINE CREATININE: CPT | Performed by: OBSTETRICS & GYNECOLOGY

## 2019-03-31 PROCEDURE — 84156 ASSAY OF PROTEIN URINE: CPT | Performed by: OBSTETRICS & GYNECOLOGY

## 2019-03-31 PROCEDURE — 86850 RBC ANTIBODY SCREEN: CPT | Performed by: OBSTETRICS & GYNECOLOGY

## 2019-03-31 PROCEDURE — 86900 BLOOD TYPING SEROLOGIC ABO: CPT | Performed by: OBSTETRICS & GYNECOLOGY

## 2019-03-31 PROCEDURE — 59400 OBSTETRICAL CARE: CPT | Performed by: OBSTETRICS & GYNECOLOGY

## 2019-03-31 PROCEDURE — 99214 OFFICE O/P EST MOD 30 MIN: CPT

## 2019-03-31 RX ORDER — IBUPROFEN 600 MG/1
600 TABLET ORAL EVERY 6 HOURS PRN
Status: DISCONTINUED | OUTPATIENT
Start: 2019-03-31 | End: 2019-04-01 | Stop reason: HOSPADM

## 2019-03-31 RX ORDER — OXYCODONE HYDROCHLORIDE AND ACETAMINOPHEN 5; 325 MG/1; MG/1
2 TABLET ORAL EVERY 4 HOURS PRN
Status: DISCONTINUED | OUTPATIENT
Start: 2019-03-31 | End: 2019-04-01 | Stop reason: HOSPADM

## 2019-03-31 RX ORDER — SODIUM CHLORIDE, SODIUM LACTATE, POTASSIUM CHLORIDE, CALCIUM CHLORIDE 600; 310; 30; 20 MG/100ML; MG/100ML; MG/100ML; MG/100ML
125 INJECTION, SOLUTION INTRAVENOUS CONTINUOUS
Status: DISCONTINUED | OUTPATIENT
Start: 2019-03-31 | End: 2019-03-31

## 2019-03-31 RX ORDER — BISACODYL 10 MG
10 SUPPOSITORY, RECTAL RECTAL DAILY PRN
Status: DISCONTINUED | OUTPATIENT
Start: 2019-03-31 | End: 2019-04-01 | Stop reason: HOSPADM

## 2019-03-31 RX ORDER — SIMETHICONE 80 MG
80 TABLET,CHEWABLE ORAL 4 TIMES DAILY PRN
Status: DISCONTINUED | OUTPATIENT
Start: 2019-03-31 | End: 2019-04-01 | Stop reason: HOSPADM

## 2019-03-31 RX ORDER — OXYTOCIN/RINGER'S LACTATE 30/500 ML
PLASTIC BAG, INJECTION (ML) INTRAVENOUS
Status: COMPLETED
Start: 2019-03-31 | End: 2019-03-31

## 2019-03-31 RX ORDER — DIAPER,BRIEF,INFANT-TODD,DISP
1 EACH MISCELLANEOUS AS NEEDED
Status: DISCONTINUED | OUTPATIENT
Start: 2019-03-31 | End: 2019-04-01 | Stop reason: HOSPADM

## 2019-03-31 RX ORDER — ACETAMINOPHEN 325 MG/1
650 TABLET ORAL EVERY 6 HOURS PRN
Status: DISCONTINUED | OUTPATIENT
Start: 2019-03-31 | End: 2019-04-01 | Stop reason: HOSPADM

## 2019-03-31 RX ORDER — DOCUSATE SODIUM 100 MG/1
100 CAPSULE, LIQUID FILLED ORAL 2 TIMES DAILY
Status: DISCONTINUED | OUTPATIENT
Start: 2019-03-31 | End: 2019-04-01 | Stop reason: HOSPADM

## 2019-03-31 RX ORDER — DIPHENHYDRAMINE HCL 25 MG
25 TABLET ORAL EVERY 6 HOURS PRN
Status: DISCONTINUED | OUTPATIENT
Start: 2019-03-31 | End: 2019-04-01 | Stop reason: HOSPADM

## 2019-03-31 RX ORDER — OXYCODONE HYDROCHLORIDE AND ACETAMINOPHEN 5; 325 MG/1; MG/1
1 TABLET ORAL EVERY 4 HOURS PRN
Status: DISCONTINUED | OUTPATIENT
Start: 2019-03-31 | End: 2019-04-01 | Stop reason: HOSPADM

## 2019-03-31 RX ORDER — METOCLOPRAMIDE HYDROCHLORIDE 5 MG/ML
10 INJECTION INTRAMUSCULAR; INTRAVENOUS EVERY 6 HOURS PRN
Status: DISCONTINUED | OUTPATIENT
Start: 2019-03-31 | End: 2019-04-01

## 2019-03-31 RX ORDER — ONDANSETRON 2 MG/ML
4 INJECTION INTRAMUSCULAR; INTRAVENOUS EVERY 6 HOURS PRN
Status: DISCONTINUED | OUTPATIENT
Start: 2019-03-31 | End: 2019-04-01 | Stop reason: HOSPADM

## 2019-03-31 RX ORDER — OXYTOCIN/RINGER'S LACTATE 30/500 ML
250 PLASTIC BAG, INJECTION (ML) INTRAVENOUS CONTINUOUS
Status: ACTIVE | OUTPATIENT
Start: 2019-03-31 | End: 2019-03-31

## 2019-03-31 RX ORDER — SENNOSIDES 8.6 MG
1 TABLET ORAL DAILY
Status: DISCONTINUED | OUTPATIENT
Start: 2019-03-31 | End: 2019-04-01 | Stop reason: HOSPADM

## 2019-03-31 RX ADMIN — Medication 250 MILLI-UNITS/MIN: at 10:48

## 2019-03-31 RX ADMIN — IBUPROFEN 600 MG: 600 TABLET ORAL at 20:43

## 2019-03-31 RX ADMIN — SODIUM CHLORIDE, SODIUM LACTATE, POTASSIUM CHLORIDE, AND CALCIUM CHLORIDE 125 ML/HR: .6; .31; .03; .02 INJECTION, SOLUTION INTRAVENOUS at 10:03

## 2019-03-31 RX ADMIN — DOCUSATE SODIUM 100 MG: 100 CAPSULE, LIQUID FILLED ORAL at 18:28

## 2019-03-31 RX ADMIN — IBUPROFEN 600 MG: 600 TABLET ORAL at 11:42

## 2019-03-31 RX ADMIN — DOCUSATE SODIUM 100 MG: 100 CAPSULE, LIQUID FILLED ORAL at 11:42

## 2019-04-01 VITALS
HEART RATE: 73 BPM | SYSTOLIC BLOOD PRESSURE: 126 MMHG | DIASTOLIC BLOOD PRESSURE: 89 MMHG | OXYGEN SATURATION: 97 % | TEMPERATURE: 98.6 F | BODY MASS INDEX: 33.3 KG/M2 | WEIGHT: 207.23 LBS | RESPIRATION RATE: 18 BRPM | HEIGHT: 66 IN

## 2019-04-01 LAB — RPR SER QL: NORMAL

## 2019-04-01 PROCEDURE — 99024 POSTOP FOLLOW-UP VISIT: CPT | Performed by: OBSTETRICS & GYNECOLOGY

## 2019-04-01 RX ORDER — METOCLOPRAMIDE HYDROCHLORIDE 5 MG/ML
10 INJECTION INTRAMUSCULAR; INTRAVENOUS EVERY 6 HOURS PRN
Status: DISCONTINUED | OUTPATIENT
Start: 2019-04-01 | End: 2019-04-01 | Stop reason: HOSPADM

## 2019-04-01 RX ORDER — DOCUSATE SODIUM 100 MG/1
100 CAPSULE, LIQUID FILLED ORAL 2 TIMES DAILY
Qty: 10 CAPSULE | Refills: 0 | Status: SHIPPED | OUTPATIENT
Start: 2019-04-01 | End: 2019-08-06 | Stop reason: ALTCHOICE

## 2019-04-01 RX ORDER — ACETAMINOPHEN 325 MG/1
650 TABLET ORAL EVERY 6 HOURS PRN
Qty: 30 TABLET | Refills: 0 | Status: CANCELLED | OUTPATIENT
Start: 2019-04-01

## 2019-04-01 RX ORDER — IBUPROFEN 600 MG/1
600 TABLET ORAL EVERY 6 HOURS PRN
Qty: 30 TABLET | Refills: 0 | Status: SHIPPED | OUTPATIENT
Start: 2019-04-01 | End: 2019-04-01

## 2019-04-01 RX ORDER — IBUPROFEN 600 MG/1
600 TABLET ORAL EVERY 6 HOURS PRN
Qty: 30 TABLET | Refills: 0 | Status: SHIPPED | OUTPATIENT
Start: 2019-04-01

## 2019-04-01 RX ORDER — DOCUSATE SODIUM 100 MG/1
100 CAPSULE, LIQUID FILLED ORAL 2 TIMES DAILY
Qty: 10 CAPSULE | Refills: 0 | Status: SHIPPED | OUTPATIENT
Start: 2019-04-01 | End: 2019-04-01

## 2019-04-01 RX ADMIN — IBUPROFEN 600 MG: 600 TABLET ORAL at 12:40

## 2019-04-01 RX ADMIN — OXYCODONE HYDROCHLORIDE AND ACETAMINOPHEN 1 TABLET: 5; 325 TABLET ORAL at 01:04

## 2019-04-01 RX ADMIN — DOCUSATE SODIUM 100 MG: 100 CAPSULE, LIQUID FILLED ORAL at 08:34

## 2019-04-01 RX ADMIN — SENNOSIDES 8.6 MG: 8.6 TABLET, FILM COATED ORAL at 08:34

## 2019-04-01 RX ADMIN — IBUPROFEN 600 MG: 600 TABLET ORAL at 04:45

## 2019-04-02 ENCOUNTER — TRANSITIONAL CARE MANAGEMENT (OUTPATIENT)
Dept: FAMILY MEDICINE CLINIC | Facility: CLINIC | Age: 34
End: 2019-04-02

## 2019-04-25 ENCOUNTER — TELEPHONE (OUTPATIENT)
Dept: OBGYN CLINIC | Facility: CLINIC | Age: 34
End: 2019-04-25

## 2019-05-14 ENCOUNTER — POSTPARTUM VISIT (OUTPATIENT)
Dept: OBGYN CLINIC | Facility: CLINIC | Age: 34
End: 2019-05-14

## 2019-05-14 ENCOUNTER — OFFICE VISIT (OUTPATIENT)
Dept: NEUROLOGY | Facility: CLINIC | Age: 34
End: 2019-05-14
Payer: COMMERCIAL

## 2019-05-14 VITALS — DIASTOLIC BLOOD PRESSURE: 74 MMHG | WEIGHT: 194 LBS | BODY MASS INDEX: 33.3 KG/M2 | SYSTOLIC BLOOD PRESSURE: 120 MMHG

## 2019-05-14 VITALS
DIASTOLIC BLOOD PRESSURE: 74 MMHG | SYSTOLIC BLOOD PRESSURE: 106 MMHG | HEIGHT: 64 IN | BODY MASS INDEX: 33.46 KG/M2 | HEART RATE: 64 BPM | WEIGHT: 196 LBS

## 2019-05-14 DIAGNOSIS — IMO0001 CONTRACEPTION: ICD-10-CM

## 2019-05-14 DIAGNOSIS — Z78.9 BREASTFEEDING (INFANT): ICD-10-CM

## 2019-05-14 DIAGNOSIS — G43.709 CHRONIC MIGRAINE WITHOUT AURA WITHOUT STATUS MIGRAINOSUS, NOT INTRACTABLE: Primary | ICD-10-CM

## 2019-05-14 PROCEDURE — 99214 OFFICE O/P EST MOD 30 MIN: CPT | Performed by: PHYSICIAN ASSISTANT

## 2019-05-14 PROCEDURE — 99024 POSTOP FOLLOW-UP VISIT: CPT | Performed by: OBSTETRICS & GYNECOLOGY

## 2019-05-14 RX ORDER — CYPROHEPTADINE HYDROCHLORIDE 4 MG/1
4 TABLET ORAL 3 TIMES DAILY PRN
Qty: 30 TABLET | Refills: 0 | Status: SHIPPED | OUTPATIENT
Start: 2019-05-14 | End: 2019-08-06 | Stop reason: ALTCHOICE

## 2019-05-14 RX ORDER — ACETAMINOPHEN AND CODEINE PHOSPHATE 120; 12 MG/5ML; MG/5ML
1 SOLUTION ORAL DAILY
Qty: 28 TABLET | Refills: 4 | Status: SHIPPED | OUTPATIENT
Start: 2019-05-14 | End: 2019-08-06

## 2019-05-14 RX ORDER — SUMATRIPTAN 100 MG/1
100 TABLET, FILM COATED ORAL ONCE AS NEEDED
Qty: 9 TABLET | Refills: 0 | Status: SHIPPED | OUTPATIENT
Start: 2019-05-14 | End: 2020-11-10 | Stop reason: SDUPTHER

## 2019-08-06 ENCOUNTER — ANNUAL EXAM (OUTPATIENT)
Dept: OBGYN CLINIC | Facility: CLINIC | Age: 34
End: 2019-08-06
Payer: COMMERCIAL

## 2019-08-06 VITALS
DIASTOLIC BLOOD PRESSURE: 82 MMHG | HEIGHT: 65 IN | BODY MASS INDEX: 34.12 KG/M2 | SYSTOLIC BLOOD PRESSURE: 124 MMHG | WEIGHT: 204.8 LBS

## 2019-08-06 DIAGNOSIS — Z11.51 SCREENING FOR HPV (HUMAN PAPILLOMAVIRUS): ICD-10-CM

## 2019-08-06 DIAGNOSIS — N84.1 CERVICAL POLYP: ICD-10-CM

## 2019-08-06 DIAGNOSIS — Z30.42 DEPO-PROVERA CONTRACEPTIVE STATUS: ICD-10-CM

## 2019-08-06 DIAGNOSIS — Z30.013 ENCOUNTER FOR INITIAL PRESCRIPTION OF INJECTABLE CONTRACEPTIVE: ICD-10-CM

## 2019-08-06 DIAGNOSIS — Z12.4 SCREENING FOR MALIGNANT NEOPLASM OF CERVIX: ICD-10-CM

## 2019-08-06 DIAGNOSIS — Z01.419 WELL FEMALE EXAM WITH ROUTINE GYNECOLOGICAL EXAM: Primary | ICD-10-CM

## 2019-08-06 PROCEDURE — 57500 BIOPSY OF CERVIX: CPT | Performed by: OBSTETRICS & GYNECOLOGY

## 2019-08-06 PROCEDURE — 99395 PREV VISIT EST AGE 18-39: CPT | Performed by: OBSTETRICS & GYNECOLOGY

## 2019-08-06 RX ORDER — MEDROXYPROGESTERONE ACETATE 150 MG/ML
150 INJECTION, SUSPENSION INTRAMUSCULAR
Qty: 1 ML | Refills: 3 | Status: SHIPPED | OUTPATIENT
Start: 2019-08-06 | End: 2020-01-14

## 2019-08-06 RX ORDER — MEDROXYPROGESTERONE ACETATE 150 MG/ML
150 INJECTION, SUSPENSION INTRAMUSCULAR ONCE
Status: COMPLETED | OUTPATIENT
Start: 2019-08-06 | End: 2019-08-06

## 2019-08-06 RX ADMIN — MEDROXYPROGESTERONE ACETATE 150 MG: 150 INJECTION, SUSPENSION INTRAMUSCULAR at 15:42

## 2019-08-06 NOTE — PROGRESS NOTES
ASSESSMENT & PLAN: Justino Baca is a 29 y o  [id number] with normal gynecologic exam     1   Routine well woman exam done today  2    Pap and HPV:Pap with HPV was done today  Current ASCCP Guidelines reviewed  3   The patient declined STD testing  No testing performed  4  Contraception: Depo-Provera  Refills sent  Will return today to initiate  5  The following were reviewed in today's visit: breast self exam, family planning choices, exercise and healthy diet  6  Patient to return to office in 12 months for annual exam    7  Polypectomy - will call with pathology results  All questions have been answered to her satisfaction  CC:  Annual Gynecologic Examination    HPI: Justino Baca is a 29 y o  [id number] who presents for annual gynecologic examination  She has the following concerns:  Wants to switch back to Depo  Health Maintenance:    She exercises 0 days per week  She wears her seatbelt routinely  She is practicing breast self awareness  She feels safe at home  Patient tries to follow a balanced diet  Past Medical History:   Diagnosis Date    Candidal intertrigo     Last Assessed:6/14/16    CTS (carpal tunnel syndrome)     Migraine     Migraines     Mild depression ([facility] [address] )     Last Assessed:7/27/17    Obesity     Last Assessed:7/27/17    Varicella     childhood    Vitamin D deficiency     Last Assessed:4/21/16       Past Surgical History:   Procedure Laterality Date    NO PAST SURGERIES         Past OB/Gyn History:  Period Cycle (Days): 28  Period Duration (Days): 4-5  Period Pattern: Regular  Menstrual Flow: Moderate  Menstrual Control: Maxi pad  Menstrual Control Change Freq (Hours): 3-4  Dysmenorrhea: (!) Mild  Dysmenorrhea Symptoms: CrampingPatient's last menstrual period was 08/03/2019      History of sexually transmitted infection: No  Patient is currently sexually active: heterosexual  Birth control:oral progesterone-only contraceptive    Last Pap  2016 :  no abnormalities;  HPV negative    Family History  Family History   Problem Relation Age of Onset    Hypertension Mother     Vitamin D deficiency Mother     Gout Father     Hypertension Father     Migraines Sister     Vitamin D deficiency Sister     Vitamin D deficiency Brother    Sylvain Feliz Cancer Brother         leukemia    Diabetes Maternal Grandfather         type 2    Alzheimer's disease Maternal Grandfather     Anemia Family     No Known Problems Son     No Known Problems Maternal Grandmother     Alzheimer's disease Paternal Grandmother     Stroke Paternal Uncle        Social History:  Social History     Socioeconomic History    Marital status: /Civil Union     Spouse name: Not on file    Number of children: 1    Years of education: Not on file    Highest education level: Not on file   Occupational History    Occupation: unemployed   Social Needs    Financial resource strain: Not on file    Food insecurity:     Worry: Not on file     Inability: Not on file   Popset needs:     Medical: Not on file     Non-medical: Not on file   Tobacco Use    Smoking status: Never Smoker    Smokeless tobacco: Never Used   Substance and Sexual Activity    Alcohol use: No    Drug use: No    Sexual activity: Yes     Partners: Male     Birth control/protection: None, OCP   Lifestyle    Physical activity:     Days per week: Not on file     Minutes per session: Not on file    Stress: Not on file   Relationships    Social connections:     Talks on phone: Not on file     Gets together: Not on file     Attends Mosque service: Not on file     Active member of club or organization: Not on file     Attends meetings of clubs or organizations: Not on file     Relationship status: Not on file    Intimate partner violence:     Fear of current or ex partner: Not on file     Emotionally abused: Not on file     Physically abused: Not on file     Forced sexual activity: Not on file   Other Topics Concern    Not on file   Social History Narrative    Attempting to conceive    Non-smoker     No know smoke exposure     No alcohol use     No illicit drug     Seatbelt use     Exercises sometimes     Has one child     Lives with Family              Presently lives with , children  Patient is   Patient is currently employed  Allergies: Allergies   Allergen Reactions    Iron Palpitations    Milk-Related Compounds GI Intolerance    Other      Cats and dogs        Medications:    Current Outpatient Medications:     Acetaminophen (TYLENOL) 325 MG CAPS, Take by mouth as needed  , Disp: , Rfl:     folic acid (FOLVITE) 761 mcg tablet, Take 400 mcg by mouth daily, Disp: , Rfl:     ibuprofen (MOTRIN) 600 mg tablet, Take 1 tablet (600 mg total) by mouth every 6 (six) hours as needed for mild pain (Patient taking differently: Take 600 mg by mouth every other day ), Disp: 30 tablet, Rfl: 0    Magnesium 400 MG CAPS, Take 1 capsule (400 mg total) by mouth daily, Disp: 30 capsule, Rfl: 6    PRENATAL-FE BIS-FA-DHA W/O A PO, Take 1 tablet by mouth daily, Disp: , Rfl:     SUMAtriptan (IMITREX) 100 mg tablet, Take 1 tablet (100 mg total) by mouth once as needed for migraine for up to 1 dose Do not breastfeed for 4 hours after taking medcine, Disp: 9 tablet, Rfl: 0    medroxyPROGESTERone (DEPO-PROVERA) 150 mg/mL injection, Inject 1 mL (150 mg total) into a muscle every 3 (three) months, Disp: 1 mL, Rfl: 3    Review of Systems:  Review of Systems   Constitutional: Negative for unexpected weight change  Respiratory: Negative for shortness of breath  Cardiovascular: Negative for chest pain  Gastrointestinal: Negative for abdominal distention, abdominal pain, blood in stool, constipation, nausea and vomiting  Genitourinary: Positive for menstrual problem (spotting with POPs)  Negative for difficulty urinating, dysuria, frequency, urgency and vaginal discharge  Neurological: Negative for headaches  Physical Exam:  /82 (BP Location: Right arm, Patient Position: Sitting, Cuff Size: Large)   Ht 5' 5" (1 651 m)   Wt 92 9 kg (204 lb 12 8 oz)   LMP 08/03/2019   BMI 34 08 kg/m²    Physical Exam   Constitutional: She is oriented to person, place, and time  Vital signs are normal  She appears well-developed and well-nourished  Genitourinary: Vagina normal and uterus normal  Pelvic exam was performed with patient supine  There is no rash, tenderness or lesion on the right labia  There is no rash, tenderness or lesion on the left labia  Vagina exhibits rugosity  No erythema, tenderness or bleeding in the vagina  No vaginal discharge found  Right adnexum does not display mass, does not display tenderness and does not display fullness  Left adnexum does not display mass, does not display tenderness and does not display fullness  Cervix is parous  Cervix exhibits polyp and pinkness  Cervix does not exhibit motion tenderness, lesion or discharge  Uterus is not enlarged or tender  Genitourinary Comments: No bladder tenderness   HENT:   Head: Normocephalic  Neck: No thyromegaly present  Cardiovascular: Normal rate, regular rhythm and normal heart sounds  Pulmonary/Chest: Effort normal and breath sounds normal  Right breast exhibits no inverted nipple, no mass, no nipple discharge, no skin change and no tenderness  Left breast exhibits no inverted nipple, no mass, no nipple discharge, no skin change and no tenderness  Abdominal: Soft  She exhibits no distension  There is no tenderness  Neurological: She is alert and oriented to person, place, and time  Psychiatric: She has a normal mood and affect  Her behavior is normal    Vitals reviewed  Polyp grasped with a ring forcep  Twisted base and removed without difficulty  Patient tolerated well  Minimal bleeding after polypectomy

## 2019-08-09 LAB
CLINICAL INFO: NORMAL
PATH REPORT.FINAL DX SPEC: NORMAL
PROCEDURE TYPE: NORMAL
SPECIMEN SOURCE: NORMAL

## 2019-08-09 NOTE — RESULT ENCOUNTER NOTE
Sunny Sargent,     The polyp I  removed from your cervix is benign  Please contact the office with any questions

## 2019-08-10 LAB
CLINICAL INFO: NORMAL
CYTO CVX: NORMAL
CYTOLOGY CMNT CVX/VAG CYTO-IMP: NORMAL
DATE PREVIOUS BX: NORMAL
HPV E6+E7 MRNA CVX QL NAA+PROBE: NOT DETECTED
LMP START DATE: NORMAL
SL AMB PREV. PAP:: NORMAL
SPECIMEN SOURCE CVX/VAG CYTO: NORMAL

## 2019-09-24 DIAGNOSIS — IMO0001 CONTRACEPTION: ICD-10-CM

## 2019-09-24 RX ORDER — ACETAMINOPHEN AND CODEINE PHOSPHATE 120; 12 MG/5ML; MG/5ML
SOLUTION ORAL
Qty: 28 TABLET | Refills: 4 | OUTPATIENT
Start: 2019-09-24

## 2019-10-28 ENCOUNTER — CLINICAL SUPPORT (OUTPATIENT)
Dept: OBGYN CLINIC | Facility: CLINIC | Age: 34
End: 2019-10-28
Payer: COMMERCIAL

## 2019-10-28 VITALS
BODY MASS INDEX: 33.41 KG/M2 | SYSTOLIC BLOOD PRESSURE: 110 MMHG | WEIGHT: 200.8 LBS | RESPIRATION RATE: 16 BRPM | DIASTOLIC BLOOD PRESSURE: 80 MMHG | HEART RATE: 88 BPM

## 2019-10-28 DIAGNOSIS — Z30.42 ENCOUNTER FOR SURVEILLANCE OF INJECTABLE CONTRACEPTIVE: Primary | ICD-10-CM

## 2019-10-28 LAB — SL AMB POCT URINE HCG: NEGATIVE

## 2019-10-28 PROCEDURE — 96372 THER/PROPH/DIAG INJ SC/IM: CPT | Performed by: OBSTETRICS & GYNECOLOGY

## 2019-10-28 PROCEDURE — 81025 URINE PREGNANCY TEST: CPT

## 2019-10-28 RX ORDER — MEDROXYPROGESTERONE ACETATE 150 MG/ML
150 INJECTION, SUSPENSION INTRAMUSCULAR
Status: SHIPPED | OUTPATIENT
Start: 2019-10-28 | End: 2020-04-25

## 2019-10-28 RX ADMIN — MEDROXYPROGESTERONE ACETATE 150 MG: 150 INJECTION, SUSPENSION INTRAMUSCULAR at 15:10

## 2019-10-28 NOTE — PROGRESS NOTES
Patient here for second depo injection  Pt ID by name and   UPT negative  Pt denies any headaches, pain in arms/legs, chest pain, SOB, or leg cramps  Spotting for past two weeks  Discussed need for 1200 mg of calcium daily, including dietary sources, supplements as needed  Pt advised to call for SOB, chest pain, headaches, visual disturbances, abdominal pain, extremity pain  Pt verbalized understanding  Injection site: right deltoid    Next Injection Due: -    Last Annual: 19    Pt tolerated injection well

## 2020-01-14 ENCOUNTER — TELEPHONE (OUTPATIENT)
Dept: OBGYN CLINIC | Facility: CLINIC | Age: 35
End: 2020-01-14

## 2020-01-14 DIAGNOSIS — Z30.41 ENCOUNTER FOR SURVEILLANCE OF CONTRACEPTIVE PILLS: Primary | ICD-10-CM

## 2020-01-14 DIAGNOSIS — Z30.013 ENCOUNTER FOR INITIAL PRESCRIPTION OF INJECTABLE CONTRACEPTIVE: ICD-10-CM

## 2020-01-14 RX ORDER — NORETHINDRONE ACETATE AND ETHINYL ESTRADIOL AND FERROUS FUMARATE 1MG-20(24)
1 KIT ORAL DAILY
Qty: 84 TABLET | Refills: 0 | Status: SHIPPED | OUTPATIENT
Start: 2020-01-14 | End: 2021-05-11 | Stop reason: SDUPTHER

## 2020-01-14 NOTE — TELEPHONE ENCOUNTER
Pt does not like the Depo injection  Pt would like to switch to regular birth control pills  Can you please cancel her Depo medication and prescribe oc pills for her to her [facility]? Thank you!

## 2020-01-14 NOTE — TELEPHONE ENCOUNTER
Liliao canceled  Start OCP immediately  Expect irregular bleeding with change hormones  Use backup for 2 weeks    Please make a 3 month OCP check

## 2020-03-09 ENCOUNTER — OFFICE VISIT (OUTPATIENT)
Dept: OBGYN CLINIC | Facility: CLINIC | Age: 35
End: 2020-03-09
Payer: COMMERCIAL

## 2020-03-09 VITALS
HEART RATE: 88 BPM | SYSTOLIC BLOOD PRESSURE: 126 MMHG | WEIGHT: 202 LBS | BODY MASS INDEX: 33.61 KG/M2 | DIASTOLIC BLOOD PRESSURE: 86 MMHG

## 2020-03-09 DIAGNOSIS — N93.9 ABNORMAL UTERINE BLEEDING: Primary | ICD-10-CM

## 2020-03-09 LAB — SL AMB POCT URINE HCG: NORMAL

## 2020-03-09 PROCEDURE — 99213 OFFICE O/P EST LOW 20 MIN: CPT | Performed by: OBSTETRICS & GYNECOLOGY

## 2020-03-09 PROCEDURE — 1036F TOBACCO NON-USER: CPT | Performed by: OBSTETRICS & GYNECOLOGY

## 2020-03-09 PROCEDURE — 3079F DIAST BP 80-89 MM HG: CPT | Performed by: OBSTETRICS & GYNECOLOGY

## 2020-03-09 PROCEDURE — 3074F SYST BP LT 130 MM HG: CPT | Performed by: OBSTETRICS & GYNECOLOGY

## 2020-03-09 PROCEDURE — 81025 URINE PREGNANCY TEST: CPT | Performed by: OBSTETRICS & GYNECOLOGY

## 2020-03-09 RX ORDER — NORETHINDRONE ACETATE AND ETHINYL ESTRADIOL AND FERROUS FUMARATE 1MG-20(24)
1 KIT ORAL DAILY
Qty: 84 TABLET | Refills: 3 | Status: SHIPPED | OUTPATIENT
Start: 2020-03-09 | End: 2021-03-24 | Stop reason: SDUPTHER

## 2020-03-10 NOTE — PROGRESS NOTES
Assessment/Plan:  28y  with AUB, likely from changes in hormonal birth control  Pt will restart OCP's and all 3 months for effectiveness  Will also do pelvic US, CBC and TSH    Abnormal uterine bleeding  -     US pelvis complete w transvaginal; Future  -     CBC; Future  -     TSH, 3rd generation with Free T4 reflex; Future  -     norethindrone-ethinyl estradiol-ferrous fumarate (LOESTIN 24 FE) 1-20 MG-MCG(24) per tablet; Take 1 tablet by mouth daily  -     CBC  -     TSH, 3rd generation with Free T4 reflex  -     POCT urine HCG        Subjective:      Patient ID: Dennie Ona is a 29 y o  female  HPI  28y  who presents with irreg vaginal bleeding  She started on Depo Provera, and after the second dose she had irreg bleeding so switched to OCP's  She took the OCP's for 3 weeks and discontinued them because she still had light daily bleeding  She now still has irreg bleeding  She want to have regular periods  The following portions of the patient's history were reviewed and updated as appropriate: allergies, current medications, past family history, past medical history, past social history, past surgical history and problem list     Review of Systems   Constitutional: Negative  HENT: Negative  Respiratory: Negative  Cardiovascular: Negative  Gastrointestinal: Negative  Genitourinary: Positive for menstrual problem and vaginal bleeding  Negative for vaginal discharge and vaginal pain  Neurological: Negative  Objective:      /86   Pulse 88   Wt 91 6 kg (202 lb)   BMI 33 61 kg/m²          Physical Exam   Constitutional: She is oriented to person, place, and time  She appears well-developed and well-nourished  No distress  HENT:   Head: Normocephalic and atraumatic  Eyes: Pupils are equal, round, and reactive to light   Conjunctivae and EOM are normal    Pulmonary/Chest: Effort normal    Genitourinary: Vagina normal  There is no rash, tenderness or lesion on the right labia  There is no rash, tenderness or lesion on the left labia  Cervix exhibits no motion tenderness, no discharge and no friability  No tenderness or bleeding in the vagina  No vaginal discharge found  Neurological: She is alert and oriented to person, place, and time  Skin: Skin is warm and dry  She is not diaphoretic  No erythema  No pallor  Nursing note and vitals reviewed

## 2020-03-27 ENCOUNTER — TELEPHONE (OUTPATIENT)
Dept: FAMILY MEDICINE CLINIC | Facility: CLINIC | Age: 35
End: 2020-03-27

## 2020-11-10 ENCOUNTER — OFFICE VISIT (OUTPATIENT)
Dept: FAMILY MEDICINE CLINIC | Facility: OTHER | Age: 35
End: 2020-11-10
Payer: COMMERCIAL

## 2020-11-10 VITALS
HEIGHT: 66 IN | TEMPERATURE: 98 F | HEART RATE: 102 BPM | WEIGHT: 193 LBS | BODY MASS INDEX: 31.02 KG/M2 | SYSTOLIC BLOOD PRESSURE: 140 MMHG | DIASTOLIC BLOOD PRESSURE: 90 MMHG

## 2020-11-10 DIAGNOSIS — R07.89 OTHER CHEST PAIN: ICD-10-CM

## 2020-11-10 DIAGNOSIS — G43.709 CHRONIC MIGRAINE WITHOUT AURA WITHOUT STATUS MIGRAINOSUS, NOT INTRACTABLE: ICD-10-CM

## 2020-11-10 DIAGNOSIS — I10 ESSENTIAL HYPERTENSION: ICD-10-CM

## 2020-11-10 DIAGNOSIS — Z13.6 SCREENING FOR CARDIOVASCULAR CONDITION: ICD-10-CM

## 2020-11-10 DIAGNOSIS — R07.89 MUSCULOSKELETAL CHEST PAIN: Primary | ICD-10-CM

## 2020-11-10 DIAGNOSIS — Z23 ENCOUNTER FOR IMMUNIZATION: ICD-10-CM

## 2020-11-10 DIAGNOSIS — Z13.1 SCREENING FOR DIABETES MELLITUS: ICD-10-CM

## 2020-11-10 PROCEDURE — 90471 IMMUNIZATION ADMIN: CPT | Performed by: FAMILY MEDICINE

## 2020-11-10 PROCEDURE — 93000 ELECTROCARDIOGRAM COMPLETE: CPT | Performed by: FAMILY MEDICINE

## 2020-11-10 PROCEDURE — 90686 IIV4 VACC NO PRSV 0.5 ML IM: CPT | Performed by: FAMILY MEDICINE

## 2020-11-10 PROCEDURE — 3725F SCREEN DEPRESSION PERFORMED: CPT | Performed by: FAMILY MEDICINE

## 2020-11-10 PROCEDURE — 3080F DIAST BP >= 90 MM HG: CPT | Performed by: FAMILY MEDICINE

## 2020-11-10 PROCEDURE — 3077F SYST BP >= 140 MM HG: CPT | Performed by: FAMILY MEDICINE

## 2020-11-10 PROCEDURE — 1036F TOBACCO NON-USER: CPT | Performed by: FAMILY MEDICINE

## 2020-11-10 PROCEDURE — 3008F BODY MASS INDEX DOCD: CPT | Performed by: FAMILY MEDICINE

## 2020-11-10 PROCEDURE — 99213 OFFICE O/P EST LOW 20 MIN: CPT | Performed by: FAMILY MEDICINE

## 2020-11-10 RX ORDER — SUMATRIPTAN 100 MG/1
100 TABLET, FILM COATED ORAL ONCE AS NEEDED
Qty: 9 TABLET | Refills: 0 | Status: SHIPPED | OUTPATIENT
Start: 2020-11-10 | End: 2020-12-18

## 2020-12-18 DIAGNOSIS — G43.709 CHRONIC MIGRAINE WITHOUT AURA WITHOUT STATUS MIGRAINOSUS, NOT INTRACTABLE: ICD-10-CM

## 2020-12-18 RX ORDER — SUMATRIPTAN 100 MG/1
TABLET, FILM COATED ORAL
Qty: 9 TABLET | Refills: 0 | Status: SHIPPED | OUTPATIENT
Start: 2020-12-18 | End: 2021-04-13 | Stop reason: SDUPTHER

## 2021-02-14 ENCOUNTER — OFFICE VISIT (OUTPATIENT)
Dept: URGENT CARE | Age: 36
End: 2021-02-14
Payer: COMMERCIAL

## 2021-02-14 VITALS
SYSTOLIC BLOOD PRESSURE: 160 MMHG | DIASTOLIC BLOOD PRESSURE: 90 MMHG | TEMPERATURE: 97.6 F | HEART RATE: 73 BPM | HEIGHT: 64 IN | WEIGHT: 188 LBS | RESPIRATION RATE: 18 BRPM | BODY MASS INDEX: 32.1 KG/M2 | OXYGEN SATURATION: 99 %

## 2021-02-14 DIAGNOSIS — R39.9 UTI SYMPTOMS: Primary | ICD-10-CM

## 2021-02-14 LAB
SL AMB  POCT GLUCOSE, UA: ABNORMAL
SL AMB LEUKOCYTE ESTERASE,UA: ABNORMAL
SL AMB POCT BILIRUBIN,UA: ABNORMAL
SL AMB POCT BLOOD,UA: ABNORMAL
SL AMB POCT CLARITY,UA: ABNORMAL
SL AMB POCT COLOR,UA: YELLOW
SL AMB POCT KETONES,UA: ABNORMAL
SL AMB POCT NITRITE,UA: ABNORMAL
SL AMB POCT PH,UA: 5
SL AMB POCT SPECIFIC GRAVITY,UA: 1
SL AMB POCT URINE PROTEIN: ABNORMAL
SL AMB POCT UROBILINOGEN: 0.2

## 2021-02-14 PROCEDURE — G0382 LEV 3 HOSP TYPE B ED VISIT: HCPCS | Performed by: NURSE PRACTITIONER

## 2021-02-14 PROCEDURE — 87086 URINE CULTURE/COLONY COUNT: CPT | Performed by: NURSE PRACTITIONER

## 2021-02-14 PROCEDURE — 81002 URINALYSIS NONAUTO W/O SCOPE: CPT | Performed by: NURSE PRACTITIONER

## 2021-02-14 RX ORDER — NITROFURANTOIN 25; 75 MG/1; MG/1
100 CAPSULE ORAL 2 TIMES DAILY
Qty: 10 CAPSULE | Refills: 0 | Status: SHIPPED | OUTPATIENT
Start: 2021-02-14 | End: 2021-08-09 | Stop reason: ALTCHOICE

## 2021-02-14 NOTE — PATIENT INSTRUCTIONS
Urinary Tract Infection in Women   WHAT YOU NEED TO KNOW:   A urinary tract infection (UTI) is caused by bacteria that get inside your urinary tract  Most bacteria that enter your urinary tract come out when you urinate  If the bacteria stay in your urinary tract, you may get an infection  Your urinary tract includes your kidneys, ureters, bladder, and urethra  Urine is made in your kidneys, and it flows from the ureters to the bladder  Urine leaves the bladder through the urethra  A UTI is more common in your lower urinary tract, which includes your bladder and urethra  DISCHARGE INSTRUCTIONS:   Return to the emergency department if:   · You are urinating very little or not at all  · You have a high fever with shaking chills  · You have side or back pain that gets worse  Call your doctor if:   · You have a fever  · You do not feel better after 2 days of taking antibiotics  · You are vomiting  · You have questions or concerns about your condition or care  Medicines:   · Antibiotics  help fight a bacterial infection  If you have UTIs often (called recurrent UTIs), you may be given antibiotics to take regularly  You will be given directions for when and how to use antibiotics  The goal is to prevent UTIs but not cause antibiotic resistance by using antibiotics too often  · Medicines  may be given to decrease pain and burning when you urinate  They will also help decrease the feeling that you need to urinate often  These medicines will make your urine orange or red  · Take your medicine as directed  Contact your healthcare provider if you think your medicine is not helping or if you have side effects  Tell him or her if you are allergic to any medicine  Keep a list of the medicines, vitamins, and herbs you take  Include the amounts, and when and why you take them  Bring the list or the pill bottles to follow-up visits   Carry your medicine list with you in case of an emergency  Follow up with your healthcare provider as directed:  Write down your questions so you remember to ask them during your visits  Prevent another UTI:   · Empty your bladder often  Urinate and empty your bladder as soon as you feel the need  Do not hold your urine for long periods of time  · Wipe from front to back after you urinate or have a bowel movement  This will help prevent germs from getting into your urinary tract through your urethra  · Drink liquids as directed  Ask how much liquid to drink each day and which liquids are best for you  You may need to drink more liquids than usual to help flush out the bacteria  Do not drink alcohol, caffeine, or citrus juices  These can irritate your bladder and increase your symptoms  Your healthcare provider may recommend cranberry juice to help prevent a UTI  · Urinate after you have sex  This can help flush out bacteria passed during sex  · Do not douche or use feminine deodorants  These can change the chemical balance in your vagina  · Change sanitary pads or tampons often  This will help prevent germs from getting into your urinary tract  · Talk to your healthcare provider about your birth control method  You may need to change your method if it is increasing your risk for UTIs  · Wear cotton underwear and clothes that are loose  Tight pants and nylon underwear can trap moisture and cause bacteria to grow  · Vaginal estrogen may be recommended  This medicine helps prevent UTIs in women who have gone through menopause or are in sapphire-menopause  · Do pelvic muscle exercises often  Pelvic muscle exercises may help you start and stop urinating  Strong pelvic muscles may help you empty your bladder easier  Squeeze these muscles tightly for 5 seconds like you are trying to hold back urine  Then relax for 5 seconds  Gradually work up to squeezing for 10 seconds  Do 3 sets of 15 repetitions a day, or as directed      © Copyright [address] Information is for [employer] use only and may not be sold, redistributed or otherwise used for commercial purposes  All illustrations and images included in CareNotes® are the copyrighted property of [employer]  or Marcus Barreto  The above information is an  only  It is not intended as medical advice for individual conditions or treatments  Talk to your doctor, nurse or pharmacist before following any medical regimen to see if it is safe and effective for you

## 2021-02-14 NOTE — PROGRESS NOTES
[facility]        NAME: Salvatore Dumont is a 28 y o  female  : 1985    MRN: [id number]  DATE: 2021  TIME: 3:53 PM    Assessment and Plan   UTI symptoms [R39 9]  1  UTI symptoms  POCT urine dip    Urine culture    nitrofurantoin (MACROBID) 100 mg capsule         Patient Instructions     Urine positive for leuks esterace and blood  Will send for culture  Take meds as directed  Antibiotics for possible UTI  OTC med for pain prn  Follow up with PCP in 3-5 days  Proceed to  ER if symptoms worsen  Chief Complaint     Chief Complaint   Patient presents with    Possible UTI     burning, frequncy, and pressure upon urination          History of Present Illness       HPI   Reports pain with urination x 1 week, and started having blood in urine x 2-3 days, intermittent  No fever  Last menstrual period was 3 weeks ago  Review of Systems   Review of Systems   Constitutional: Negative for chills and fever  HENT: Negative for sore throat  Respiratory: Negative for cough, shortness of breath and wheezing  Cardiovascular: Negative for chest pain  Gastrointestinal: Negative for diarrhea and vomiting  Genitourinary: Positive for dysuria, frequency and hematuria  Negative for vaginal discharge           Current Medications       Current Outpatient Medications:     norethindrone-ethinyl estradiol-ferrous fumarate (LOESTIN 24 FE) 1-20 MG-MCG(24) per tablet, Take 1 tablet by mouth daily, Disp: 84 tablet, Rfl: 0    SUMAtriptan (IMITREX) 100 mg tablet, PLEASE SEE ATTACHED FOR DETAILED DIRECTIONS, Disp: 9 tablet, Rfl: 0    Acetaminophen (TYLENOL) 325 MG CAPS, Take by mouth as needed  , Disp: , Rfl:     folic acid (FOLVITE) 919 mcg tablet, Take 400 mcg by mouth daily, Disp: , Rfl:     ibuprofen (MOTRIN) 600 mg tablet, Take 1 tablet (600 mg total) by mouth every 6 (six) hours as needed for mild pain (Patient not taking: Reported on 2021), Disp: 30 tablet, Rfl: 0    Magnesium 400 MG CAPS, Take 1 capsule (400 mg total) by mouth daily (Patient not taking: Reported on 2/14/2021), Disp: 30 capsule, Rfl: 6    medroxyPROGESTERone acetate (DEPO-PROVERA SYRINGE) 150 mg/mL injection, , Disp: , Rfl:     nitrofurantoin (MACROBID) 100 mg capsule, Take 1 capsule (100 mg total) by mouth 2 (two) times a day, Disp: 10 capsule, Rfl: 0    norethindrone-ethinyl estradiol-ferrous fumarate (LOESTIN 24 FE) 1-20 MG-MCG(24) per tablet, Take 1 tablet by mouth daily (Patient not taking: Reported on 2/14/2021), Disp: 84 tablet, Rfl: 3    Current Allergies     Allergies as of 02/14/2021 - Reviewed 02/14/2021   Allergen Reaction Noted    Iron Palpitations 07/27/2017    Milk-related compounds GI Intolerance 09/24/2012    Other  08/07/2018            The following portions of the patient's history were reviewed and updated as appropriate: allergies, current medications, past family history, past medical history, past social history, past surgical history and problem list      Past Medical History:   Diagnosis Date    Candidal intertrigo     Last Assessed:6/14/16    CTS (carpal tunnel syndrome)     Migraine     Migraines     Mild depression ([address] )     Last Assessed:7/27/17    Obesity     Last Assessed:7/27/17    Varicella     childhood    Vitamin D deficiency     Last Assessed:4/21/16       Past Surgical History:   Procedure Laterality Date    NO PAST SURGERIES         Family History   Problem Relation Age of Onset    Hypertension Mother     Vitamin D deficiency Mother     Gout Father     Hypertension Father     Migraines Sister     Vitamin D deficiency Sister     Vitamin D deficiency Brother     Cancer Brother         leukemia    Diabetes Maternal Grandfather         type 2    Alzheimer's disease Maternal Grandfather     Anemia Family     No Known Problems Son     No Known Problems Maternal Grandmother     Alzheimer's disease Paternal Grandmother     Stroke Paternal Uncle          Medications have been verified  Objective   /90   Pulse 73   Temp 97 6 °F (36 4 °C)   Resp 18   Ht 5' 4" (1 626 m)   Wt 85 3 kg (188 lb)   SpO2 99%   BMI 32 27 kg/m²   No LMP recorded  (Menstrual status: Birth Control)  Physical Exam     Physical Exam  Constitutional:       Appearance: She is not ill-appearing or diaphoretic  Cardiovascular:      Rate and Rhythm: Regular rhythm  Heart sounds: Normal heart sounds  Pulmonary:      Effort: Pulmonary effort is normal       Breath sounds: Normal breath sounds  Abdominal:      General: There is no distension  Tenderness: There is abdominal tenderness (suprapubic, slightly to the right)  There is no right CVA tenderness, left CVA tenderness, guarding or rebound  Neurological:      Mental Status: She is alert

## 2021-02-15 LAB — BACTERIA UR CULT: NORMAL

## 2021-03-03 ENCOUNTER — TELEPHONE (OUTPATIENT)
Dept: FAMILY MEDICINE CLINIC | Facility: OTHER | Age: 36
End: 2021-03-03

## 2021-03-03 ENCOUNTER — LAB (OUTPATIENT)
Dept: LAB | Facility: AMBULARY SURGERY CENTER | Age: 36
End: 2021-03-03
Attending: OBSTETRICS & GYNECOLOGY
Payer: COMMERCIAL

## 2021-03-03 DIAGNOSIS — Z13.1 SCREENING FOR DIABETES MELLITUS: Primary | ICD-10-CM

## 2021-03-03 LAB
ALBUMIN SERPL BCP-MCNC: 3.4 G/DL (ref 3.5–5)
ALP SERPL-CCNC: 87 U/L (ref 46–116)
ALT SERPL W P-5'-P-CCNC: 22 U/L (ref 12–78)
ANION GAP SERPL CALCULATED.3IONS-SCNC: 9 MMOL/L (ref 4–13)
AST SERPL W P-5'-P-CCNC: 18 U/L (ref 5–45)
BILIRUB SERPL-MCNC: 0.39 MG/DL (ref 0.2–1)
BUN SERPL-MCNC: 10 MG/DL (ref 5–25)
CALCIUM ALBUM COR SERPL-MCNC: 9 MG/DL (ref 8.3–10.1)
CALCIUM SERPL-MCNC: 8.5 MG/DL (ref 8.3–10.1)
CHLORIDE SERPL-SCNC: 105 MMOL/L (ref 100–108)
CHOLEST SERPL-MCNC: 187 MG/DL (ref 50–200)
CO2 SERPL-SCNC: 25 MMOL/L (ref 21–32)
CREAT SERPL-MCNC: 0.61 MG/DL (ref 0.6–1.3)
GFR SERPL CREATININE-BSD FRML MDRD: 118 ML/MIN/1.73SQ M
GLUCOSE P FAST SERPL-MCNC: 87 MG/DL (ref 65–99)
HDLC SERPL-MCNC: 45 MG/DL
LDLC SERPL CALC-MCNC: 118 MG/DL (ref 0–100)
NONHDLC SERPL-MCNC: 142 MG/DL
POTASSIUM SERPL-SCNC: 3.9 MMOL/L (ref 3.5–5.3)
PROT SERPL-MCNC: 7.6 G/DL (ref 6.4–8.2)
SODIUM SERPL-SCNC: 139 MMOL/L (ref 136–145)
TRIGL SERPL-MCNC: 118 MG/DL
TSH SERPL DL<=0.05 MIU/L-ACNC: 1.06 UIU/ML (ref 0.36–3.74)

## 2021-03-03 PROCEDURE — 80061 LIPID PANEL: CPT

## 2021-03-03 PROCEDURE — 36415 COLL VENOUS BLD VENIPUNCTURE: CPT

## 2021-03-03 PROCEDURE — 80053 COMPREHEN METABOLIC PANEL: CPT

## 2021-03-03 PROCEDURE — 84443 ASSAY THYROID STIM HORMONE: CPT

## 2021-03-09 ENCOUNTER — OFFICE VISIT (OUTPATIENT)
Dept: FAMILY MEDICINE CLINIC | Facility: OTHER | Age: 36
End: 2021-03-09
Payer: COMMERCIAL

## 2021-03-09 VITALS
OXYGEN SATURATION: 98 % | DIASTOLIC BLOOD PRESSURE: 90 MMHG | HEIGHT: 66 IN | WEIGHT: 191.25 LBS | SYSTOLIC BLOOD PRESSURE: 132 MMHG | HEART RATE: 91 BPM | BODY MASS INDEX: 30.74 KG/M2 | TEMPERATURE: 98 F

## 2021-03-09 DIAGNOSIS — Z00.00 ANNUAL PHYSICAL EXAM: Primary | ICD-10-CM

## 2021-03-09 DIAGNOSIS — I10 ESSENTIAL HYPERTENSION: ICD-10-CM

## 2021-03-09 DIAGNOSIS — L50.9 HIVES: ICD-10-CM

## 2021-03-09 PROCEDURE — 3080F DIAST BP >= 90 MM HG: CPT | Performed by: FAMILY MEDICINE

## 2021-03-09 PROCEDURE — 3725F SCREEN DEPRESSION PERFORMED: CPT | Performed by: FAMILY MEDICINE

## 2021-03-09 PROCEDURE — 1036F TOBACCO NON-USER: CPT | Performed by: FAMILY MEDICINE

## 2021-03-09 PROCEDURE — 3008F BODY MASS INDEX DOCD: CPT | Performed by: FAMILY MEDICINE

## 2021-03-09 PROCEDURE — 99395 PREV VISIT EST AGE 18-39: CPT | Performed by: FAMILY MEDICINE

## 2021-03-09 PROCEDURE — 3075F SYST BP GE 130 - 139MM HG: CPT | Performed by: FAMILY MEDICINE

## 2021-03-09 RX ORDER — LISINOPRIL 2.5 MG/1
2.5 TABLET ORAL DAILY
Qty: 30 TABLET | Refills: 1 | Status: SHIPPED | OUTPATIENT
Start: 2021-03-09 | End: 2021-04-01 | Stop reason: SDUPTHER

## 2021-03-09 RX ORDER — HYDROXYZINE HYDROCHLORIDE 25 MG/1
25 TABLET, FILM COATED ORAL 3 TIMES DAILY PRN
Qty: 90 TABLET | Refills: 0 | Status: SHIPPED | OUTPATIENT
Start: 2021-03-09 | End: 2021-04-01 | Stop reason: SDUPTHER

## 2021-03-09 NOTE — PATIENT INSTRUCTIONS
2,000 units of vitamin D daily         Wellness Visit for Adults   AMBULATORY CARE:   A wellness visit  is when you see your healthcare provider to get screened for health problems  Your healthcare provider will also give you advice on how to stay healthy  Write down your questions so you remember to ask them  Ask your healthcare provider how often you should have a wellness visit  What happens at a wellness visit:  Your healthcare provider will ask about your health, and your family history of health problems  This includes high blood pressure, heart disease, and cancer  He or she will ask if you have symptoms that concern you, if you smoke, and about your mood  You may also be asked about your intake of medicines, supplements, food, and alcohol  Any of the following may be done:  · Your weight  will be checked  Your height may also be checked so your body mass index (BMI) can be calculated  Your BMI shows if you are at a healthy weight  · Your blood pressure  and heart rate will be checked  Your temperature may also be checked  · Blood and urine tests  may be done  Blood tests may be done to check your cholesterol levels  Abnormal cholesterol levels increase your risk for heart disease and stroke  You may also need a blood or urine test to check for diabetes if you are at increased risk  Urine tests may be done to look for signs of an infection or kidney disease  · A physical exam  includes checking your heartbeat and lungs with a stethoscope  Your healthcare provider may also check your skin to look for sun damage  · Screening tests  may be recommended  A screening test is done to check for diseases that may not cause symptoms  The screening tests you may need depend on your age, gender, family history, and lifestyle habits  For example, colorectal screening may be recommended if you are 48years old or older      Screening tests you need if you are a woman:   · A Pap smear  is used to screen for cervical cancer  Pap smears are usually done every 3 to 5 years depending on your age  You may need them more often if you have had abnormal Pap smear test results in the past  Ask your healthcare provider how often you should have a Pap smear  · A mammogram  is an x-ray of your breasts to screen for breast cancer  Experts recommend mammograms every 2 years starting at age 48 years  You may need a mammogram at age 52 years or younger if you have an increased risk for breast cancer  Talk to your healthcare provider about when you should start having mammograms and how often you need them  Vaccines you may need:   · Get an influenza vaccine  every year  The influenza vaccine protects you from the flu  Several types of viruses cause the flu  The viruses change over time, so new vaccines are made each year  · Get a tetanus-diphtheria (Td) booster vaccine  every 10 years  This vaccine protects you against tetanus and diphtheria  Tetanus is a severe infection that may cause painful muscle spasms and lockjaw  Diphtheria is a severe bacterial infection that causes a thick covering in the back of your mouth and throat  · Get a human papillomavirus (HPV) vaccine  if you are female and aged 23 to 32 or male 23 to 24 and never received it  This vaccine protects you from HPV infection  HPV is the most common infection spread by sexual contact  HPV may also cause vaginal, penile, and anal cancers  · Get a pneumococcal vaccine  if you are aged 72 years or older  The pneumococcal vaccine is an injection given to protect you from pneumococcal disease  Pneumococcal disease is an infection caused by pneumococcal bacteria  The infection may cause pneumonia, meningitis, or an ear infection  · Get a shingles vaccine  if you are 60 or older, even if you have had shingles before  The shingles vaccine is an injection to protect you from the varicella-zoster virus  This is the same virus that causes chickenpox   Shingles is a painful rash that develops in people who had chickenpox or have been exposed to the virus  How to eat healthy:  My Plate is a model for planning healthy meals  It shows the types and amounts of foods that should go on your plate  Fruits and vegetables make up about half of your plate, and grains and protein make up the other half  A serving of dairy is included on the side of your plate  The amount of calories and serving sizes you need depends on your age, gender, weight, and height  Examples of healthy foods are listed below:  · Eat a variety of vegetables  such as dark green, red, and orange vegetables  You can also include canned vegetables low in sodium (salt) and frozen vegetables without added butter or sauces  · Eat a variety of fresh fruits , canned fruit in 100% juice, frozen fruit, and dried fruit  · Include whole grains  At least half of the grains you eat should be whole grains  Examples include whole-wheat bread, wheat pasta, brown rice, and whole-grain cereals such as oatmeal     · Eat a variety of protein foods such as seafood (fish and shellfish), lean meat, and poultry without skin (turkey and chicken)  Examples of lean meats include pork leg, shoulder, or tenderloin, and beef round, sirloin, tenderloin, and extra lean ground beef  Other protein foods include eggs and egg substitutes, beans, peas, soy products, nuts, and seeds  · Choose low-fat dairy products such as skim or 1% milk or low-fat yogurt, cheese, and cottage cheese  · Limit unhealthy fats  such as butter, hard margarine, and shortening  Exercise:  Exercise at least 30 minutes per day on most days of the week  Some examples of exercise include walking, biking, dancing, and swimming  You can also fit in more physical activity by taking the stairs instead of the elevator or parking farther away from stores  Include muscle strengthening activities 2 days each week  Regular exercise provides many health benefits   It helps you manage your weight, and decreases your risk for type 2 diabetes, heart disease, stroke, and high blood pressure  Exercise can also help improve your mood  Ask your healthcare provider about the best exercise plan for you  General health and safety guidelines:   · Do not smoke  Nicotine and other chemicals in cigarettes and cigars can cause lung damage  Ask your healthcare provider for information if you currently smoke and need help to quit  E-cigarettes or smokeless tobacco still contain nicotine  Talk to your healthcare provider before you use these products  · Limit alcohol  A drink of alcohol is 12 ounces of beer, 5 ounces of wine, or 1½ ounces of liquor  · Lose weight, if needed  Being overweight increases your risk of certain health conditions  These include heart disease, high blood pressure, type 2 diabetes, and certain types of cancer  · Protect your skin  Do not sunbathe or use tanning beds  Use sunscreen with a SPF 15 or higher  Apply sunscreen at least 15 minutes before you go outside  Reapply sunscreen every 2 hours  Wear protective clothing, hats, and sunglasses when you are outside  · Drive safely  Always wear your seatbelt  Make sure everyone in your car wears a seatbelt  A seatbelt can save your life if you are in an accident  Do not use your cell phone when you are driving  This could distract you and cause an accident  Pull over if you need to make a call or send a text message  · Practice safe sex  Use latex condoms if are sexually active and have more than one partner  Your healthcare provider may recommend screening tests for sexually transmitted infections (STIs)  · Wear helmets, lifejackets, and protective gear  Always wear a helmet when you ride a bike or motorcycle, go skiing, or play sports that could cause a head injury  Wear protective equipment when you play sports  Wear a lifejacket when you are on a boat or doing water sports      © Copyright [employer] [facility] Information is for [employer] use only and may not be sold, redistributed or otherwise used for commercial purposes  All illustrations and images included in CareNotes® are the copyrighted property of [employer] Inc  or Marcus Rosario   The above information is an  only  It is not intended as medical advice for individual conditions or treatments  Talk to your doctor, nurse or pharmacist before following any medical regimen to see if it is safe and effective for you

## 2021-03-09 NOTE — PROGRESS NOTES
ADULT ANNUAL [address]  [facility]    NAME: Ron Rivas  AGE: 28 y o  SEX: female  : 1985     DATE: 3/9/2021     Assessment and Plan:     Problem List Items Addressed This Visit        Cardiovascular and Mediastinum    Essential hypertension    Relevant Medications    lisinopril (ZESTRIL) 2 5 mg tablet      Other Visit Diagnoses     Annual physical exam    -  Primary    Hives        Relevant Medications    hydrOXYzine HCL (ATARAX) 25 mg tablet          Immunizations and preventive care screenings were discussed with patient today  Appropriate education was printed on patient's after visit summary  Counseling:  Alcohol/drug use: discussed moderation in alcohol intake, the recommendations for healthy alcohol use, and avoidance of illicit drug use  Dental Health: discussed importance of regular tooth brushing, flossing, and dental visits  Injury prevention: discussed safety/seat belts, safety helmets, smoke detectors, carbon dioxide detectors, and smoking near bedding or upholstery  Sexual health: discussed sexually transmitted diseases, partner selection, use of condoms, avoidance of unintended pregnancy, and contraceptive alternatives  · Exercise: the importance of regular exercise/physical activity was discussed  Recommend exercise 3-5 times per week for at least 30 minutes  Patient presents for annual psychical and BP check  She has been monitoring BP at home and reports SBP usually in 130s and DBP usually in the 90s  BP today is mildly elevated at 132/90  Patient amenable to beginning Lisinopril 2 5mg once daily  She is advised to continue following DASH diet  Continue monitoring BP at home  Will prescribe Atarax to be used PRN (q8h) for hives or anxiety  Informed on likely side effect of sedation     Patient advised to limit fluids 2-4 hours prior to bedtime to prevent having to wake up during the night to urinate- this will likely improve sleep  Will continue to monitor  Return in about 1 month (around 4/9/2021) for Recheck BP   The patient indicates understanding of these issues and agrees with the plan  Chief Complaint:     Chief Complaint   Patient presents with    Physical Exam    Blood Pressure Check      History of Present Illness:     Adult Annual Physical   Patient here for a comprehensive physical exam  The patient reports breaking out in hives occasionally when she has anxiety  She reports that the hives tends to present on her chest, arms, abdomen or neck and resolves within a day  She reports feeling very itchy when this occurs  She denies chest tightness, throat tightness, shortness of breath and face swelling  She has not tried anything to treat this  Diet and Physical Activity  · Diet/Nutrition: well balanced diet, limited junk food, consuming 3-5 servings of fruits/vegetables daily, adequate fiber intake and adequate whole grain intake  · Exercise: no formal exercise  Depression Screening  PHQ-9 Depression Screening    PHQ-9:   Frequency of the following problems over the past two weeks:      Little interest or pleasure in doing things: 0 - not at all  Feeling down, depressed, or hopeless: 0 - not at all  PHQ-2 Score: 0       General Health  · Sleep: sleeps poorly  Reports that she cannot fall back asleep after urinating  · Hearing: normal - bilateral   · Vision: no vision problems  · Dental: no dental visits for >1 year, brushes teeth twice daily and flosses teeth occasionally  /GYN Health  · Last menstrual period: ~ 4 weeks ago- was normal    · Contraceptive method: consistent use of oral contraceptives  · History of STDs?: no   · Last pap smear in August of 2019 was normal       Review of Systems:     Review of Systems   Constitutional: Negative for activity change, appetite change, chills, fever and unexpected weight change     Respiratory: Negative for cough, chest tightness, shortness of breath and wheezing  Cardiovascular: Negative for chest pain, palpitations and leg swelling  Gastrointestinal: Negative for constipation, diarrhea, nausea and vomiting  Genitourinary: Negative for difficulty urinating and dysuria  Skin: Positive for rash  Neurological: Positive for headaches (  chronic issue )  Negative for dizziness and light-headedness  Psychiatric/Behavioral: Positive for sleep disturbance  The patient is nervous/anxious         Past Medical History:     Past Medical History:   Diagnosis Date    Candidal intertrigo     Last Assessed:6/14/16    CTS (carpal tunnel syndrome)     Migraine     Migraines     Mild depression (HCC)     Last Assessed:7/27/17    Obesity     Last Assessed:7/27/17    Varicella     childhood    Vitamin D deficiency     Last Assessed:4/21/16      Past Surgical History:     Past Surgical History:   Procedure Laterality Date    NO PAST SURGERIES        Social History:        Social History     Socioeconomic History    Marital status: /Civil Union     Spouse name: None    Number of children: 1    Years of education: None    Highest education level: None   Occupational History    Occupation: unemployed   Social Needs    Financial resource strain: None    Food insecurity     Worry: None     Inability: None    Transportation needs     Medical: None     Non-medical: None   Tobacco Use    Smoking status: Never Smoker    Smokeless tobacco: Never Used   Substance and Sexual Activity    Alcohol use: No    Drug use: No    Sexual activity: Yes     Partners: Male     Birth control/protection: None, OCP   Lifestyle    Physical activity     Days per week: None     Minutes per session: None    Stress: None   Relationships    Social connections     Talks on phone: None     Gets together: None     Attends Jew service: None     Active member of club or organization: None     Attends meetings of clubs or organizations: None Relationship status: None    Intimate partner violence     Fear of current or ex partner: None     Emotionally abused: None     Physically abused: None     Forced sexual activity: None   Other Topics Concern    None   Social History Narrative    Attempting to conceive    Non-smoker     No know smoke exposure     No alcohol use     No illicit drug     Seatbelt use     Exercises sometimes     Has one child     Lives with Family               Family History:     Family History   Problem Relation Age of Onset    Hypertension Mother     Vitamin D deficiency Mother     Gout Father     Hypertension Father     Migraines Sister     Vitamin D deficiency Sister     Vitamin D deficiency Brother     Cancer Brother         leukemia    Diabetes Maternal Grandfather         type 2    Alzheimer's disease Maternal Grandfather     Anemia Family     No Known Problems Son     No Known Problems Maternal Grandmother     Alzheimer's disease Paternal Grandmother     Stroke Paternal Uncle       Current Medications:     Current Outpatient Medications   Medication Sig Dispense Refill    Acetaminophen (TYLENOL) 325 MG CAPS Take by mouth as needed        ibuprofen (MOTRIN) 600 mg tablet Take 1 tablet (600 mg total) by mouth every 6 (six) hours as needed for mild pain 30 tablet 0    norethindrone-ethinyl estradiol-ferrous fumarate (LOESTIN 24 FE) 1-20 MG-MCG(24) per tablet Take 1 tablet by mouth daily 84 tablet 3    SUMAtriptan (IMITREX) 100 mg tablet PLEASE SEE ATTACHED FOR DETAILED DIRECTIONS 9 tablet 0    folic acid (FOLVITE) 826 mcg tablet Take 400 mcg by mouth daily      hydrOXYzine HCL (ATARAX) 25 mg tablet Take 1 tablet (25 mg total) by mouth 3 (three) times a day as needed for itching, allergies or anxiety 90 tablet 0    lisinopril (ZESTRIL) 2 5 mg tablet Take 1 tablet (2 5 mg total) by mouth daily 30 tablet 1    Magnesium 400 MG CAPS Take 1 capsule (400 mg total) by mouth daily (Patient not taking: Reported on 2/14/2021) 30 capsule 6    medroxyPROGESTERone acetate (DEPO-PROVERA SYRINGE) 150 mg/mL injection       nitrofurantoin (MACROBID) 100 mg capsule Take 1 capsule (100 mg total) by mouth 2 (two) times a day (Patient not taking: Reported on 3/9/2021) 10 capsule 0    norethindrone-ethinyl estradiol-ferrous fumarate (LOESTIN 24 FE) 1-20 MG-MCG(24) per tablet Take 1 tablet by mouth daily (Patient not taking: Reported on 3/9/2021) 84 tablet 0     No current facility-administered medications for this visit  Allergies: Allergies   Allergen Reactions    Iron Palpitations    Milk-Related Compounds GI Intolerance    Other      Cats and dogs       Physical Exam:     /90 (BP Location: Right arm, Patient Position: Sitting, Cuff Size: Large)   Pulse 91   Temp 98 °F (36 7 °C) (Temporal)   Ht 5' 6" (1 676 m)   Wt 86 8 kg (191 lb 4 oz)   SpO2 98%   BMI 30 87 kg/m²     Physical Exam  Vitals signs and nursing note reviewed  Constitutional:       General: She is not in acute distress  Appearance: Normal appearance  She is well-developed  She is not ill-appearing, toxic-appearing or diaphoretic  HENT:      Head: Normocephalic and atraumatic  Right Ear: Tympanic membrane, ear canal and external ear normal       Left Ear: Tympanic membrane, ear canal and external ear normal    Eyes:      General: No scleral icterus  Right eye: No discharge  Left eye: No discharge  Extraocular Movements: Extraocular movements intact  Conjunctiva/sclera: Conjunctivae normal    Neck:      Musculoskeletal: Normal range of motion and neck supple  Cardiovascular:      Rate and Rhythm: Normal rate and regular rhythm  Heart sounds: No murmur  Pulmonary:      Effort: Pulmonary effort is normal  No respiratory distress  Breath sounds: Normal breath sounds  Abdominal:      General: Bowel sounds are normal  There is no distension  Palpations: Abdomen is soft  Tenderness:  There is no abdominal tenderness  Musculoskeletal: Normal range of motion  Right lower leg: No edema  Left lower leg: No edema  Skin:     General: Skin is warm and dry  Capillary Refill: Capillary refill takes less than 2 seconds  Findings: No rash  Neurological:      General: No focal deficit present  Mental Status: She is alert and oriented to person, place, and time     Psychiatric:         Mood and Affect: Mood normal          Behavior: Behavior normal           Maddie Reid MD   [place] [id number]

## 2021-03-23 ENCOUNTER — SOCIAL WORK (OUTPATIENT)
Dept: BEHAVIORAL/MENTAL HEALTH CLINIC | Facility: CLINIC | Age: 36
End: 2021-03-23
Payer: COMMERCIAL

## 2021-03-23 DIAGNOSIS — F43.21 ADJUSTMENT DISORDER WITH DEPRESSED MOOD: Primary | ICD-10-CM

## 2021-03-23 PROCEDURE — 90834 PSYTX W PT 45 MINUTES: CPT | Performed by: SOCIAL WORKER

## 2021-03-23 NOTE — PSYCH
Assessment/Plan:      Diagnoses and all orders for this visit:    Adjustment disorder with depressed mood          Subjective:     Patient ID: Marguerite Marie is a 28 y o  female presenting with depression as it relates to a recent relationship breakdown  Pt states she has been struggling with waves of sadness and loss  Pt has two children with her  aged 3 and 6  Pt reports she had her second son after getting back with her  for one more try at the marriage  Pt states she has tried to have her  come back to the house on several occasions but it has not worked thus far  Pt states she is at the place where she feels she needs to start the process of divorce  Pt states she wanted to talk to a therapist about some of the mixed feeling she has with this  Pt reports divorce feels very final to her  Pt states part of her finds it difficult to admit this relationship has ended in failure  Pt went on to talk about numerous infidelities her  had while they were   Pt states she struggled to understand why he would do this to her as she has always been very loyal and dedicated to him  Pt states he is also great with her oldest son  Sadly, according to pt he is not in his eldest son's life much and he is starting to realize his own father has different priorities  Pt feels he prefers his two step children to his own children, and this is something she finds difficult to understand  Pt reports her youngest son who is 2 has no concept of a father figure which she finds sad  Pt went on to talk about moving from [place] when she was 16  Pt reports her father left her to come to this country when she was 3 and her mother followed when she was 10  Pt reports after 3 years, her mother returned to [place] because she missed her daughter so much  Pt states they tried to bring her to this country during her younger years but the visa was never granted   Pt takes this to mean she was meant to live in [place]  Pt states not having her parents around for some of her early years also makes it difficult to see her own children not see their father  Pt states this maybe one of the reasons why she has always held on to the hope that she could get back together with her  and be a happy family together  Pt recognizes this is no longer realistic and seeing her son start to reject his father, has brought this into greater clarity  Pt reports in the process, she has learnt to be stable, support her children, work a regular job and provide  Pt is very thankful her parents live close by and her mother is a tremendous support to her  Pt has a very good network of people and apart from the relationship struggles she has she feels loved and taken care of by those around her  Pt states her work is very supportive and they have adapted her schedule so she can 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  Pt states he son is gifted academically  Pt states she knows she is worth something and this realization of her worth has made her decide she needs to start believing for a better relationship  Pt attributes her new found self worth to her long standing [employer]odoxy beliefs  Pt reports her spiritual and [employer] life are her underpinnings  This writer encouraged pt in this and also encouraged pt to continue to draw upon her network of supports  Pt also encouraged to continue to advocate for herself and demand the best for herself  Pt reports this is going to be something she continues to work on  Pt also educated about self care, mental health wellness, and the importance of exercise, diet and good sleep hygiene  Pt reports she will follow up as needed  HPI    Review of Systems      Objective:     Physical Exam  This writer spent 45 minutes with pt from 2:50pm to 3:35   Appearance: casually dressed good eye contact; speech: normal pitch and normal volume; behavior: normal, thought process: logical and goal directed, thought content: denies SI/HI, perceptions: no delusions of AH/VH, mood: slightly depressed but brighter as session progressed, affect: congruent, orientated x4, insight/judgement: good

## 2021-03-24 ENCOUNTER — TELEPHONE (OUTPATIENT)
Dept: OBGYN CLINIC | Facility: CLINIC | Age: 36
End: 2021-03-24

## 2021-03-24 DIAGNOSIS — N93.9 ABNORMAL UTERINE BLEEDING: ICD-10-CM

## 2021-03-24 RX ORDER — NORETHINDRONE ACETATE AND ETHINYL ESTRADIOL AND FERROUS FUMARATE 1MG-20(24)
1 KIT ORAL DAILY
Qty: 84 TABLET | Refills: 3 | Status: SHIPPED | OUTPATIENT
Start: 2021-03-24 | End: 2021-05-11 | Stop reason: SDUPTHER

## 2021-03-24 NOTE — TELEPHONE ENCOUNTER
Patient is requesting a refill of her birth control to hold her over until her annual appointment on May 3rd      [facility] pharmacy, [address] [facility], [address]

## 2021-04-01 DIAGNOSIS — L50.9 HIVES: ICD-10-CM

## 2021-04-01 DIAGNOSIS — I10 ESSENTIAL HYPERTENSION: ICD-10-CM

## 2021-04-01 RX ORDER — HYDROXYZINE HYDROCHLORIDE 25 MG/1
TABLET, FILM COATED ORAL
Qty: 90 TABLET | Refills: 0 | Status: SHIPPED | OUTPATIENT
Start: 2021-04-01 | End: 2021-04-28

## 2021-04-01 RX ORDER — LISINOPRIL 2.5 MG/1
TABLET ORAL
Qty: 30 TABLET | Refills: 1 | Status: SHIPPED | OUTPATIENT
Start: 2021-04-01 | End: 2021-04-28

## 2021-04-13 ENCOUNTER — OFFICE VISIT (OUTPATIENT)
Dept: FAMILY MEDICINE CLINIC | Facility: OTHER | Age: 36
End: 2021-04-13
Payer: COMMERCIAL

## 2021-04-13 VITALS
DIASTOLIC BLOOD PRESSURE: 82 MMHG | WEIGHT: 191.13 LBS | TEMPERATURE: 98 F | OXYGEN SATURATION: 98 % | HEART RATE: 100 BPM | BODY MASS INDEX: 30.72 KG/M2 | SYSTOLIC BLOOD PRESSURE: 124 MMHG | HEIGHT: 66 IN

## 2021-04-13 DIAGNOSIS — G43.709 CHRONIC MIGRAINE WITHOUT AURA WITHOUT STATUS MIGRAINOSUS, NOT INTRACTABLE: ICD-10-CM

## 2021-04-13 DIAGNOSIS — I10 ESSENTIAL HYPERTENSION: Primary | ICD-10-CM

## 2021-04-13 PROCEDURE — 99213 OFFICE O/P EST LOW 20 MIN: CPT | Performed by: FAMILY MEDICINE

## 2021-04-13 PROCEDURE — 3008F BODY MASS INDEX DOCD: CPT | Performed by: FAMILY MEDICINE

## 2021-04-13 PROCEDURE — 3079F DIAST BP 80-89 MM HG: CPT | Performed by: FAMILY MEDICINE

## 2021-04-13 PROCEDURE — 1036F TOBACCO NON-USER: CPT | Performed by: FAMILY MEDICINE

## 2021-04-13 PROCEDURE — 3074F SYST BP LT 130 MM HG: CPT | Performed by: FAMILY MEDICINE

## 2021-04-13 RX ORDER — SUMATRIPTAN 100 MG/1
100 TABLET, FILM COATED ORAL ONCE AS NEEDED
Qty: 9 TABLET | Refills: 1 | Status: SHIPPED | OUTPATIENT
Start: 2021-04-13 | End: 2022-06-16

## 2021-04-13 NOTE — PROGRESS NOTES
Assessment/Plan:     Diagnoses and all orders for this visit:    Essential Hypertension         -     Lisinopril 2 5mg once daily     Chronic migraine without aura without status migrainosus, not intractable  -     SUMAtriptan (IMITREX) 100 mg tablet; Take 1 tablet (100 mg total) by mouth once as needed for migraine for up to 1 dose         Patient presents for follow up for up of HTN  BP is currently WNL at 124/82  Patient advised to begin monitoring BP at home  She will continue on Lisinopril 2 5mg qd for now  Return in about 3 months (around 7/13/2021)  The patient indicates understanding of these issues and agrees with the plan  Subjective:      Patient ID: Genesis Grimm is a 28 y o  female  HPI   Patient presents for follow up of Hypertension  At our last visit 1 month ago, patient was started on Lisinopril 2 5mg once daily  She states that she has been compliant with the medication and has been adhering to a low sodium diet  Patient reports that she has not been monitoring her BP at home  The following portions of the patient's history were reviewed and updated as appropriate: allergies, current medications, past family history, past medical history, past social history, past surgical history and problem list     Review of Systems   Constitutional: Negative for activity change, appetite change, chills, fever and unexpected weight change  Eyes: Negative for visual disturbance  Respiratory: Negative for cough and shortness of breath  Cardiovascular: Negative for chest pain, palpitations and leg swelling  Gastrointestinal: Negative for abdominal pain and vomiting  Musculoskeletal: Negative for arthralgias and back pain  Skin: Negative for color change and rash  Neurological: Positive for headaches (  chronic issue )  Negative for seizures and syncope  All other systems reviewed and are negative          Objective:  /82 (BP Location: Right arm, Patient Position: Sitting, Cuff Size: Large)   Pulse 100   Temp 98 °F (36 7 °C) (Temporal)   Ht 5' 6" (1 676 m)   Wt 86 7 kg (191 lb 2 oz)   SpO2 98%   BMI 30 85 kg/m²        Physical Exam  Vitals signs reviewed  Constitutional:       General: She is not in acute distress  Appearance: Normal appearance  She is not ill-appearing, toxic-appearing or diaphoretic  HENT:      Head: Normocephalic and atraumatic  Eyes:      General: No scleral icterus  Right eye: No discharge  Left eye: No discharge  Extraocular Movements: Extraocular movements intact  Conjunctiva/sclera: Conjunctivae normal    Neck:      Musculoskeletal: Normal range of motion and neck supple  Cardiovascular:      Rate and Rhythm: Normal rate and regular rhythm  Pulses: Normal pulses  Heart sounds: Normal heart sounds  Pulmonary:      Effort: Pulmonary effort is normal  No respiratory distress  Breath sounds: Normal breath sounds  No wheezing  Musculoskeletal:      Right lower leg: No edema  Left lower leg: No edema  Skin:     General: Skin is warm and dry  Neurological:      General: No focal deficit present  Mental Status: She is alert and oriented to person, place, and time     Psychiatric:         Mood and Affect: Mood normal          Behavior: Behavior normal

## 2021-04-27 DIAGNOSIS — L50.9 HIVES: ICD-10-CM

## 2021-04-27 DIAGNOSIS — I10 ESSENTIAL HYPERTENSION: ICD-10-CM

## 2021-04-28 RX ORDER — HYDROXYZINE HYDROCHLORIDE 25 MG/1
TABLET, FILM COATED ORAL
Qty: 90 TABLET | Refills: 0 | Status: SHIPPED | OUTPATIENT
Start: 2021-04-28 | End: 2021-06-02

## 2021-04-28 RX ORDER — LISINOPRIL 2.5 MG/1
TABLET ORAL
Qty: 30 TABLET | Refills: 1 | Status: SHIPPED | OUTPATIENT
Start: 2021-04-28 | End: 2021-06-02

## 2021-05-05 ENCOUNTER — IMMUNIZATIONS (OUTPATIENT)
Dept: FAMILY MEDICINE CLINIC | Facility: HOSPITAL | Age: 36
End: 2021-05-05

## 2021-05-05 DIAGNOSIS — Z23 ENCOUNTER FOR IMMUNIZATION: Primary | ICD-10-CM

## 2021-05-05 PROCEDURE — 91300 SARS-COV-2 / COVID-19 MRNA VACCINE (PFIZER-BIONTECH) 30 MCG: CPT

## 2021-05-05 PROCEDURE — 0001A SARS-COV-2 / COVID-19 MRNA VACCINE (PFIZER-BIONTECH) 30 MCG: CPT

## 2021-05-11 ENCOUNTER — ANNUAL EXAM (OUTPATIENT)
Dept: OBGYN CLINIC | Facility: CLINIC | Age: 36
End: 2021-05-11
Payer: COMMERCIAL

## 2021-05-11 VITALS
BODY MASS INDEX: 31.19 KG/M2 | HEIGHT: 65 IN | DIASTOLIC BLOOD PRESSURE: 78 MMHG | WEIGHT: 187.2 LBS | SYSTOLIC BLOOD PRESSURE: 116 MMHG

## 2021-05-11 DIAGNOSIS — Z01.419 ENCOUNTER FOR GYNECOLOGICAL EXAMINATION WITHOUT ABNORMAL FINDING: Primary | ICD-10-CM

## 2021-05-11 DIAGNOSIS — Z20.2 EXPOSURE TO STD: ICD-10-CM

## 2021-05-11 DIAGNOSIS — N93.9 ABNORMAL UTERINE BLEEDING: ICD-10-CM

## 2021-05-11 PROCEDURE — 3074F SYST BP LT 130 MM HG: CPT | Performed by: PHYSICIAN ASSISTANT

## 2021-05-11 PROCEDURE — 1036F TOBACCO NON-USER: CPT | Performed by: PHYSICIAN ASSISTANT

## 2021-05-11 PROCEDURE — 3078F DIAST BP <80 MM HG: CPT | Performed by: PHYSICIAN ASSISTANT

## 2021-05-11 PROCEDURE — 87591 N.GONORRHOEAE DNA AMP PROB: CPT | Performed by: PHYSICIAN ASSISTANT

## 2021-05-11 PROCEDURE — 99395 PREV VISIT EST AGE 18-39: CPT | Performed by: PHYSICIAN ASSISTANT

## 2021-05-11 PROCEDURE — 3008F BODY MASS INDEX DOCD: CPT | Performed by: PHYSICIAN ASSISTANT

## 2021-05-11 PROCEDURE — 87491 CHLMYD TRACH DNA AMP PROBE: CPT | Performed by: PHYSICIAN ASSISTANT

## 2021-05-11 RX ORDER — NORETHINDRONE ACETATE AND ETHINYL ESTRADIOL AND FERROUS FUMARATE 1MG-20(24)
1 KIT ORAL DAILY
Qty: 84 TABLET | Refills: 3 | Status: SHIPPED | OUTPATIENT
Start: 2021-05-11 | End: 2022-06-16 | Stop reason: SDUPTHER

## 2021-05-11 NOTE — PROGRESS NOTES
ASSESSMENT & PLAN: Carlo Mcnally is a 28 y o  [id number] with normal gynecologic exam     1   Routine well woman exam done today  2    Pap and HPV:Pap with HPV was not done today  Current ASCCP Guidelines reviewed  Last Pap 2019 :  no abnormalities  3   The patient desires STD testing  GC/CT testing performed  Safe sex practices have been discussed  4  The patient is sexually active  She is currently on an OCP for contraception and options have been discussed  5  The following were reviewed in today's visit: breast self exam, STD testing, exercise and healthy diet  6  Patient to return to office in 12 months for annual      All questions have been answered to her satisfaction  CC:  Annual Gynecologic Examination    HPI: Carlo Mcnally is a 28 y o  [id number] who presents for annual gynecologic examination  She is feeling well at this time and has no complaints  Health Maintenance:    She exercises 5 days per week  She does perform irregular monthly self breast exams  She feels safe at home  Patients does follow a well balanced diet  Past Medical History:   Diagnosis Date    Candidal intertrigo     Last Assessed:16    CTS (carpal tunnel syndrome)     Migraine     Migraines     Mild depression (HCC)     Last Assessed:17    Obesity     Last Assessed:17    Varicella     childhood    Vitamin D deficiency     Last Assessed:16       Past Surgical History:   Procedure Laterality Date    NO PAST SURGERIES         Past OB/Gyn History:  Period Cycle (Days): 28  Period Duration (Days): 3  Period Pattern: Regular  Menstrual Flow: Moderate  Menstrual Control: Maxi pad, Thin pad  Menstrual Control Change Freq (Hours): 3-4  Dysmenorrhea: (!) Mild  Dysmenorrhea Symptoms: CrampingPatient's last menstrual period was 2021    Menstrual History:  OB History        3    Para   2    Term   2            AB   1    Living   2       SAB   1    TAB        Ectopic Multiple   0    Live Births   2           Obstetric Comments   Menarche 17yo  Spontaneous vaginal delivery -2012            Menarche age: 15  Patient's last menstrual period was 05/08/2021  Period Cycle (Days): 28  Period Duration (Days): 3  Period Pattern: Regular  Menstrual Flow: Moderate  Menstrual Control: Maxi pad, Thin pad  Menstrual Control Change Freq (Hours): 3-4  Dysmenorrhea: (!) Mild  Dysmenorrhea Symptoms: Cramping    History of sexually transmitted infection No  Patient is currently sexually active  heterosexual and  monogamous  years Birth control: combination OCPs  Last Pap  2019 :  no abnormalities      Family History   Problem Relation Age of Onset    Hypertension Mother     Vitamin D deficiency Mother     Gout Father     Hypertension Father     Migraines Sister     Vitamin D deficiency Sister     Vitamin D deficiency Brother    [facility] Cancer Brother         leukemia    Diabetes Maternal Grandfather         type 2    Alzheimer's disease Maternal Grandfather     Anemia Family     No Known Problems Son     No Known Problems Maternal Grandmother     Alzheimer's disease Paternal Grandmother     Stroke Paternal Uncle        Social History:  Social History     Socioeconomic History    Marital status: /Civil Union     Spouse name: Not on file    Number of children: 1    Years of education: Not on file    Highest education level: Not on file   Occupational History    Occupation:    Social Needs    Financial resource strain: Not hard at all   [employer] insecurity     Worry: Never true     Inability: Never true    Transportation needs     Medical: No     Non-medical: No   Tobacco Use    Smoking status: Never Smoker    Smokeless tobacco: Never Used   Substance and Sexual Activity    Alcohol use: No    Drug use: No    Sexual activity: Yes     Partners: Male     Birth control/protection: OCP   Lifestyle    Physical activity     Days per week: 5 days     Minutes per session: 30 min    Stress: Only a little   Relationships    Social connections     Talks on phone: Not on file     Gets together: Not on file     Attends [employer] service: Not on file     Active member of club or organization: Not on file     Attends meetings of clubs or organizations: Not on file     Relationship status: Not on file    Intimate partner violence     Fear of current or ex partner: No     Emotionally abused: No     Physically abused: No     Forced sexual activity: No   Other Topics Concern    Not on file   Social History Narrative    Attempting to conceive    Non-smoker     No know smoke exposure     No alcohol use     No illicit drug     Seatbelt use     Exercises sometimes     Has one child     Lives with Family              Presently lives with  and children  Patient is     Patient is currently employed [address] [employer] [employer] worker  Allergies   Allergen Reactions    Iron Palpitations    Milk-Related Compounds - Food Allergy GI Intolerance    Other      Cats and dogs        Current Outpatient Medications:     Acetaminophen (TYLENOL) 325 MG CAPS, Take by mouth as needed  , Disp: , Rfl:     folic acid (FOLVITE) 820 mcg tablet, Take 400 mcg by mouth daily, Disp: , Rfl:     hydrOXYzine HCL (ATARAX) 25 mg tablet, TAKE 1 TABLET BY MOUTH 3 (THREE) TIMES A DAY AS NEEDED FOR ITCHING, ALLERGIES OR ANXIETY, Disp: 90 tablet, Rfl: 0    ibuprofen (MOTRIN) 600 mg tablet, Take 1 tablet (600 mg total) by mouth every 6 (six) hours as needed for mild pain, Disp: 30 tablet, Rfl: 0    lisinopril (ZESTRIL) 2 5 mg tablet, TAKE 1 TABLET BY MOUTH EVERY DAY, Disp: 30 tablet, Rfl: 1    Magnesium 400 MG CAPS, Take 1 capsule (400 mg total) by mouth daily, Disp: 30 capsule, Rfl: 6    nitrofurantoin (MACROBID) 100 mg capsule, Take 1 capsule (100 mg total) by mouth 2 (two) times a day, Disp: 10 capsule, Rfl: 0    norethindrone-ethinyl estradiol-ferrous fumarate (LOESTIN 24 FE) 1-20 MG-MCG(24) per tablet, Take 1 tablet by mouth daily, Disp: 84 tablet, Rfl: 3    SUMAtriptan (IMITREX) 100 mg tablet, Take 1 tablet (100 mg total) by mouth once as needed for migraine for up to 1 dose, Disp: 9 tablet, Rfl: 1    Review of Systems:  Review of Systems   Constitutional: Negative for chills, fever and unexpected weight change  Respiratory: Negative for shortness of breath  Cardiovascular: Negative for chest pain  Gastrointestinal: Negative for abdominal pain, diarrhea, nausea and vomiting  Skin: Negative for rash  Psychiatric/Behavioral: Negative for dysphoric mood  The patient is not nervous/anxious  Physical Exam:  /78 (BP Location: Right arm, Patient Position: Sitting, Cuff Size: Large)   Ht 5' 5" (1 651 m)   Wt 84 9 kg (187 lb 3 2 oz)   LMP 05/08/2021   BMI 31 15 kg/m²        Physical Exam  Constitutional:       General: She is not in acute distress  HENT:      Head: Normocephalic and atraumatic  Neck:      Thyroid: No thyroid mass or thyromegaly  Cardiovascular:      Rate and Rhythm: Normal rate and regular rhythm  Pulmonary:      Effort: Pulmonary effort is normal       Breath sounds: Normal breath sounds  Chest:      Breasts: Breasts are symmetrical          Right: Normal  No mass, skin change or tenderness  Left: Normal  No mass, skin change or tenderness  Abdominal:      General: There is no distension  Palpations: Abdomen is soft  Tenderness: There is no abdominal tenderness  Genitourinary:     General: Normal vulva  Exam position: Lithotomy position  Pubic Area: No rash  Labia:         Right: No rash or lesion  Left: No rash or lesion  Vagina: Normal  No vaginal discharge or lesions  Cervix: No cervical motion tenderness, discharge or lesion  Uterus: Normal        Adnexa:         Right: No mass  Left: No mass  Lymphadenopathy:      Upper Body:      Right upper body: No axillary adenopathy        Left upper body: No axillary adenopathy  Skin:     General: Skin is warm and dry  Findings: No lesion or rash  Neurological:      Mental Status: She is alert     Psychiatric:         Mood and Affect: Mood normal          Speech: Speech normal          Behavior: Behavior normal

## 2021-05-13 LAB
C TRACH DNA SPEC QL NAA+PROBE: NEGATIVE
N GONORRHOEA DNA SPEC QL NAA+PROBE: NEGATIVE

## 2021-05-29 ENCOUNTER — IMMUNIZATIONS (OUTPATIENT)
Dept: FAMILY MEDICINE CLINIC | Facility: HOSPITAL | Age: 36
End: 2021-05-29

## 2021-05-29 DIAGNOSIS — Z23 ENCOUNTER FOR IMMUNIZATION: Primary | ICD-10-CM

## 2021-05-29 PROCEDURE — 91300 SARS-COV-2 / COVID-19 MRNA VACCINE (PFIZER-BIONTECH) 30 MCG: CPT

## 2021-05-29 PROCEDURE — 0002A SARS-COV-2 / COVID-19 MRNA VACCINE (PFIZER-BIONTECH) 30 MCG: CPT

## 2021-06-02 DIAGNOSIS — L50.9 HIVES: ICD-10-CM

## 2021-06-02 DIAGNOSIS — I10 ESSENTIAL HYPERTENSION: ICD-10-CM

## 2021-06-02 RX ORDER — HYDROXYZINE HYDROCHLORIDE 25 MG/1
TABLET, FILM COATED ORAL
Qty: 90 TABLET | Refills: 0 | Status: SHIPPED | OUTPATIENT
Start: 2021-06-02 | End: 2021-06-16

## 2021-06-02 RX ORDER — LISINOPRIL 2.5 MG/1
TABLET ORAL
Qty: 30 TABLET | Refills: 1 | Status: SHIPPED | OUTPATIENT
Start: 2021-06-02 | End: 2021-06-16

## 2021-06-16 DIAGNOSIS — I10 ESSENTIAL HYPERTENSION: ICD-10-CM

## 2021-06-16 DIAGNOSIS — L50.9 HIVES: ICD-10-CM

## 2021-06-16 RX ORDER — LISINOPRIL 2.5 MG/1
TABLET ORAL
Qty: 90 TABLET | Refills: 1 | Status: SHIPPED | OUTPATIENT
Start: 2021-06-16 | End: 2021-08-09

## 2021-06-16 RX ORDER — HYDROXYZINE HYDROCHLORIDE 25 MG/1
TABLET, FILM COATED ORAL
Qty: 270 TABLET | Refills: 1 | Status: SHIPPED | OUTPATIENT
Start: 2021-06-16

## 2021-08-02 ENCOUNTER — OFFICE VISIT (OUTPATIENT)
Dept: FAMILY MEDICINE CLINIC | Facility: OTHER | Age: 36
End: 2021-08-02
Payer: COMMERCIAL

## 2021-08-02 VITALS
HEART RATE: 80 BPM | TEMPERATURE: 97.8 F | SYSTOLIC BLOOD PRESSURE: 154 MMHG | DIASTOLIC BLOOD PRESSURE: 104 MMHG | HEIGHT: 65 IN | BODY MASS INDEX: 32.32 KG/M2 | WEIGHT: 194 LBS | OXYGEN SATURATION: 98 %

## 2021-08-02 DIAGNOSIS — I10 ESSENTIAL HYPERTENSION: Primary | ICD-10-CM

## 2021-08-02 PROCEDURE — 99213 OFFICE O/P EST LOW 20 MIN: CPT | Performed by: FAMILY MEDICINE

## 2021-08-02 NOTE — PATIENT INSTRUCTIONS
DASH Eating Plan   WHAT YOU NEED TO KNOW:   The DASH (Dietary Approaches to Stop Hypertension) Eating Plan is designed to help prevent or lower high blood pressure  It can also help to lower LDL (bad) cholesterol and decrease your risk of heart disease  The plan is low in sodium, sugar, unhealthy fats, and total fat  It is high in potassium, calcium, magnesium, and fiber  These nutrients are added when you eat more fruits, vegetables, and whole grains  DISCHARGE INSTRUCTIONS:   Your sodium limit each day: Your dietitian will tell you how much sodium is safe for you to have each day  People with high blood pressure should have no more than 1,500 to 2,300 mg of sodium in a day  A teaspoon (tsp) of salt has 2,300 mg of sodium  This may seem like a difficult goal, but small changes to the foods you eat can make a big difference  Your healthcare provider or dietitian can help you create a meal plan that follows your sodium limit  How to limit sodium:   · Read food labels  Food labels can help you choose foods that are low in sodium  The amount of sodium is listed in milligrams (mg)  The % Daily Value (DV) column tells you how much of your daily needs are met by 1 serving of the food for each nutrient listed  Choose foods that have less than 5% of the DV of sodium  These foods are considered low in sodium  Foods that have 20% or more of the DV of sodium are considered high in sodium  Avoid foods that have more than 300 mg of sodium in each serving  Choose foods that say low-sodium, reduced-sodium, or no salt added on the food label  · Avoid salt  Do not salt food at the table, and add very little salt to foods during cooking  Use herbs and spices, such as onions, garlic, and salt-free seasonings to add flavor to foods  Try lemon or lime juice or vinegar to give foods a tart flavor  Use hot peppers or a small amount of hot pepper sauce to add a spicy flavor to foods  · Ask about salt substitutes    Ask your healthcare provider if you may use salt substitutes  Some salt substitutes have ingredients that can be harmful if you have certain health conditions  · Choose foods carefully at restaurants  Meals from restaurants, especially fast food restaurants, are often high in sodium  Some restaurants have nutrition information that tells you the amount of sodium in their foods  Ask to have your food prepared with less, or no salt  What you need to know about fats:   · Include healthy fats  Examples are unsaturated fats and omega-3 fatty acids  Unsaturated fats are found in soybean, canola, olive, or sunflower oil, and liquid and soft tub margarines  Omega-3 fatty acids are found in fatty fish, such as salmon, tuna, mackerel, and sardines  It is also found in flaxseed oil and ground flaxseed  · Avoid unhealthy fats  Do not eat unhealthy fats, such as saturated fats and trans fats  Saturated fats are found in foods that contain fat from animals  Examples are fatty meats, whole milk, butter, cream, and other dairy foods  It is also found in shortening, stick margarine, palm oil, and coconut oil  Trans fats are found in fried foods, crackers, chips, and baked goods made with margarine or shortening  Foods to include: With the DASH eating plan, you need to eat a certain number of servings from each food group  This will help you get enough of certain nutrients and limit others  The amount of servings you should eat depends on how many calories you need  Your dietitian can tell you how many calories you need  The number of servings listed next to the food groups below are for people who need about 2,000 calories each day  · Grains:  6 to 8 servings (3 of these servings should be whole-grain foods)    ? 1 slice of whole-grain bread    ? 1 ounce of dry cereal    ? ½ cup of cooked cereal, pasta, or brown rice    · Vegetables and fruits:  4 to 5 servings of fruits and 4 to 5 servings of vegetables    ?  1 medium fruit    ? ½ cup of frozen, canned (no added salt), or chopped fresh vegetables    ? ½ cup of fresh, frozen, dried, or canned fruit (canned in light syrup or fruit juice)    ? ½ cup of vegetable or fruit juice    · Dairy:  2 to 3 servings    ? 1 cup of nonfat (skim) or 1% milk    ? 1½ ounces of fat-free or low-fat cheese    ? 6 ounces of nonfat or low-fat yogurt    · Lean meat, poultry, and fish:  6 ounces or less    ? Poultry (chicken, turkey) with no skin    ? Fish (especially fatty fish, such as salmon, fresh tuna, or mackerel)    ? Lean beef and pork (loin, round, extra lean hamburger)    ? Egg whites and egg substitutes    · Nuts, seeds, and legumes:  4 to 5 servings each week    ? ½ cup of cooked beans and peas    ? 1½ ounces of unsalted nuts    ? 2 tablespoons of peanut butter or seeds    · Sweets and added sugars:  5 or less each week    ? 1 tablespoon of sugar, jelly, or jam    ? ½ cup of sorbet or gelatin    ? 1 cup of lemonade    · Fats:  2 to 3 servings each week    ? 1 teaspoon of soft margarine or vegetable oil    ? 1 tablespoon of mayonnaise    ? 2 tablespoons of salad dressing    Foods to avoid:   · Grains:      ? Baked goods, such as doughnuts, pastries, cookies, and biscuits (high in fat and sugar)    ? Mixes for cornbread and biscuits, packaged foods, such as bread stuffing, rice and pasta mixes, macaroni and cheese, and instant cereals (high in sodium)    · Fruits and vegetables:      ? Regular, canned vegetables (high in sodium)    ? Sauerkraut, pickled vegetables, and other foods prepared in brine (high in sodium)    ? Fried vegetables or vegetables in butter or high-fat sauces    ? Fruit in cream or butter sauce (high in fat)    · Dairy:      ? Whole milk, 2% milk, and cream (high in fat)    ?  Regular cheese and processed cheese (high in fat and sodium)    · Meats and protein foods:      ? Smoked or cured meat, such as corned beef, martinez, ham, hot dogs, and sausage (high in fat and sodium)    ? Canned beans and canned meats or spreads, such as potted meats, sardines, anchovies, and imitation seafood (high in sodium)    ? Deli or lunch meats, such as bologna, ham, turkey, and roast beef (high in sodium)    ? High-fat meat (T-bone steak, regular hamburger, and ribs)    ? Whole eggs and egg yolks (high in fat)    · Other:      ? Seasonings made with salt, such as garlic salt, celery salt, onion salt, seasoned salt, meat tenderizers, and monosodium glutamate (MSG)    ? Miso soup and canned or dried soup mixes (high in sodium)    ? Regular soy sauce, barbecue sauce, teriyaki sauce, steak sauce, Worcestershire sauce, and most flavored vinegars (high in sodium)    ? Regular condiments, such as mustard, ketchup, and salad dressings (high in sodium)    ? Gravy and sauces, such as Fadi or cheese sauces (high in sodium and fat)    ? Drinks high in sugar, such as soda or fruit drinks    ? Snack foods, such as salted chips, popcorn, pretzels, pork rinds, salted crackers, and salted nuts    ? Frozen foods, such as dinners, entrees, vegetables with sauces, and breaded meats (high in sodium)    Other guidelines to follow:   · Maintain a healthy weight  Your risk for heart disease is higher if you are overweight  Your healthcare provider may suggest that you lose weight if you are overweight  You can lose weight by eating fewer calories and foods that have added sugars and fat  The DASH meal plan can help you do this  Decrease calories by eating smaller portions at each meal and fewer snacks  Ask your healthcare provider for more information about how to lose weight  · Exercise regularly  Regular exercise can help you reach or maintain a healthy weight  Regular exercise can also help decrease your blood pressure and improve your cholesterol levels  Get 30 minutes or more of moderate exercise each day of the week  To lose weight, get at least 60 minutes of exercise   Talk to your healthcare provider about the best exercise program for you  · Limit alcohol  Women should limit alcohol to 1 drink a day  Men should limit alcohol to 2 drinks a day  A drink of alcohol is 12 ounces of beer, 5 ounces of wine, or 1½ ounces of liquor  © Copyright [employer] 2021 Information is for End User's use only and may not be sold, redistributed or otherwise used for commercial purposes  All illustrations and images included in CareNotes® are the copyrighted property of [employer]  or [facility] Jossie Rosario   The above information is an  only  It is not intended as medical advice for individual conditions or treatments  Talk to your doctor, nurse or pharmacist before following any medical regimen to see if it is safe and effective for you

## 2021-08-02 NOTE — PROGRESS NOTES
Assessment/Plan:     Diagnoses and all orders for this visit:    Essential hypertension  -Initial BP at today's visit, 154/104 and repeat /108  -Per patient, BP has been well controlled at home  BP does appear to have been well controlled over the past few months through chart review  Patient does report she was in an argument with her ex- just prior to this visit which is likely leading to elevated BP   -Patient will continue on Lisinopril 2 5mg once daily for now and will continue to monitor BP daily at home  -Present in 1 week for BP recheck   -DASH diet    Return in about 1 week (around 8/9/2021) for Recheck BP   The patient indicates understanding of these issues and agrees with the plan  BMI Counseling: Body mass index is 32 28 kg/m²  The BMI is above normal  Nutrition recommendations include decreasing portion sizes, encouraging healthy choices of fruits and vegetables, decreasing fast food intake, consuming healthier snacks, limiting drinks that contain sugar and moderation in carbohydrate intake  Exercise recommendations include moderate physical activity 150 minutes/week and strength training exercises  No pharmacotherapy was ordered  Subjective:      Patient ID: Kenya Wilson is a 39 y o  female  HPI   Patient presents for follow up of Hypertension  She has been on Lisinopril 2 5mg since March of this year  Patient states that she regularly monitors BP at home and BP is always WNL (120s/70s-80s)  She reports daily compliance with Lisinopril  She reports she is not monitoring sodium intake  Patient reports she is regularly exercising  (walking 45 minutes 3-4 days a week)  She denies chest pain, shortness of breath, lightheadedness, dizziness, headaches and vision changes       The following portions of the patient's history were reviewed and updated as appropriate: allergies, current medications, past family history, past medical history, past social history, past surgical history and problem list     Review of Systems   Constitutional: Negative for appetite change and unexpected weight change  Eyes: Negative for visual disturbance  Respiratory: Negative for cough, chest tightness and shortness of breath  Cardiovascular: Negative for chest pain, palpitations and leg swelling  Gastrointestinal: Negative for abdominal pain, constipation, diarrhea and nausea  Neurological: Negative for dizziness, syncope, weakness, light-headedness, numbness and headaches  Objective:  BP (!) 154/104 (BP Location: Right arm, Patient Position: Sitting, Cuff Size: Adult)   Pulse 80   Temp 97 8 °F (36 6 °C) (Temporal)   Ht 5' 5" (1 651 m)   Wt 88 kg (194 lb)   SpO2 98%   BMI 32 28 kg/m²        Physical Exam  Vitals reviewed  Constitutional:       General: She is not in acute distress  Appearance: Normal appearance  She is obese  She is not ill-appearing, toxic-appearing or diaphoretic  HENT:      Head: Normocephalic and atraumatic  Right Ear: External ear normal       Left Ear: External ear normal    Eyes:      General: No scleral icterus  Right eye: No discharge  Left eye: No discharge  Extraocular Movements: Extraocular movements intact  Conjunctiva/sclera: Conjunctivae normal    Cardiovascular:      Rate and Rhythm: Normal rate and regular rhythm  Pulses: Normal pulses  Heart sounds: Normal heart sounds  No murmur heard  No friction rub  No gallop  Pulmonary:      Effort: Pulmonary effort is normal  No respiratory distress  Breath sounds: Normal breath sounds  No wheezing, rhonchi or rales  Musculoskeletal:      Cervical back: Normal range of motion and neck supple  Right lower leg: No edema  Left lower leg: No edema  Skin:     General: Skin is warm and dry  Capillary Refill: Capillary refill takes less than 2 seconds  Neurological:      General: No focal deficit present        Mental Status: She is alert and oriented to person, place, and time        Coordination: Coordination normal       Gait: Gait normal    Psychiatric:         Mood and Affect: Mood normal          Behavior: Behavior normal

## 2021-08-09 ENCOUNTER — OFFICE VISIT (OUTPATIENT)
Dept: FAMILY MEDICINE CLINIC | Facility: OTHER | Age: 36
End: 2021-08-09
Payer: COMMERCIAL

## 2021-08-09 VITALS
BODY MASS INDEX: 32.15 KG/M2 | HEIGHT: 65 IN | SYSTOLIC BLOOD PRESSURE: 150 MMHG | DIASTOLIC BLOOD PRESSURE: 100 MMHG | HEART RATE: 86 BPM | WEIGHT: 193 LBS | OXYGEN SATURATION: 99 % | TEMPERATURE: 97.6 F | RESPIRATION RATE: 16 BRPM

## 2021-08-09 DIAGNOSIS — I10 ESSENTIAL HYPERTENSION: Primary | ICD-10-CM

## 2021-08-09 DIAGNOSIS — R53.83 FATIGUE, UNSPECIFIED TYPE: ICD-10-CM

## 2021-08-09 PROCEDURE — 99213 OFFICE O/P EST LOW 20 MIN: CPT | Performed by: FAMILY MEDICINE

## 2021-08-09 PROCEDURE — 3080F DIAST BP >= 90 MM HG: CPT | Performed by: FAMILY MEDICINE

## 2021-08-09 PROCEDURE — 3008F BODY MASS INDEX DOCD: CPT | Performed by: FAMILY MEDICINE

## 2021-08-09 PROCEDURE — 3077F SYST BP >= 140 MM HG: CPT | Performed by: FAMILY MEDICINE

## 2021-08-09 PROCEDURE — 1036F TOBACCO NON-USER: CPT | Performed by: FAMILY MEDICINE

## 2021-08-09 RX ORDER — LISINOPRIL 5 MG/1
5 TABLET ORAL DAILY
Qty: 30 TABLET | Refills: 1 | Status: SHIPPED | OUTPATIENT
Start: 2021-08-09 | End: 2021-09-30 | Stop reason: SINTOL

## 2021-08-09 NOTE — PROGRESS NOTES
Family Medicine Office Visit  Anson Burnett 39 y o  female   MRN: [id number] : 1985  ENCOUNTER: 2021 8:16 AM    Assessment & Plan   Assessment: This is a 39 y o  female who is here for Hypertension and Blood Pressure Check    1  Essential hypertension  lisinopril (ZESTRIL) 5 mg tablet    Basic metabolic panel    Basic metabolic panel   2  Fatigue, unspecified type  Vitamin D 25 hydroxy    CBC and Platelet    Vitamin D 25 hydroxy    CBC and Platelet        Plan:  1  Inc lisinopril to 5mg daily  BMP to assess K+  2  Vitamin D level to assess for def  CBC to assess for anemia  BMP to assess for electrolyte abnormalities  3  Will call with results  Return in about 1 week (around 2021) for Recheck BP  Subjective   CC: Hypertension and Blood Pressure Check      HPI  Anson Burnett is a 39y o -year-old female who  has a past medical history of Candidal intertrigo, CTS (carpal tunnel syndrome), Migraine, Migraines, Mild depression ([address] ), Obesity, Varicella, and Vitamin D deficiency        Today she presents for HTN f/u  BP at has remained above  x2 measurements  Yesterday felt like she had low energy  Stayed at her sisters house on Saturday which is when she started feeling fatigued  BP yesterday was elevated as well ()  No known covid contacts  Review of Systems   Constitutional: Negative for diaphoresis and fatigue  HENT: Negative for congestion, postnasal drip, rhinorrhea, sinus pressure, sinus pain, sneezing and sore throat  Eyes: Negative for visual disturbance  Respiratory: Negative for shortness of breath  Cardiovascular: Negative for chest pain and palpitations  Gastrointestinal: Negative for nausea and vomiting  Endocrine: Negative for cold intolerance and heat intolerance  Musculoskeletal: Negative for neck pain  Neurological: Negative for syncope, light-headedness and headaches         Allergies   Allergen Reactions    Iron Palpitations    Milk-Related Compounds - Food Allergy GI Intolerance    Other      Cats and dogs        Past Medical History, Past Surgical History, Family History, and Social History were reviewed and updated today as appropriate  Objective   /100   Pulse 86   Temp 97 6 °F (36 4 °C)   Resp 16   Ht 5' 5" (1 651 m)   Wt 87 5 kg (193 lb)   SpO2 99%   BMI 32 12 kg/m²     Physical Exam  Constitutional:       General: She is not in acute distress  Appearance: She is not diaphoretic  HENT:      Mouth/Throat:      Mouth: Mucous membranes are moist    Eyes:      Extraocular Movements: Extraocular movements intact  Conjunctiva/sclera: Conjunctivae normal       Pupils: Pupils are equal, round, and reactive to light  Neck:      Comments: No JVD  Cardiovascular:      Rate and Rhythm: Normal rate and regular rhythm  Chest Wall: PMI is not displaced  No thrill  Pulses: Normal pulses  Radial pulses are 2+ on the right side and 2+ on the left side  Heart sounds: Normal heart sounds, S1 normal and S2 normal  No murmur heard  No friction rub  No gallop  Pulmonary:      Effort: Pulmonary effort is normal       Breath sounds: Normal breath sounds  No rales  Abdominal:      General: Bowel sounds are normal       Palpations: Abdomen is soft  Musculoskeletal:      Cervical back: Neck supple  Right lower leg: No edema  Left lower leg: No edema  Skin:     General: Skin is warm  Capillary Refill: Capillary refill takes less than 2 seconds  Neurological:      Mental Status: She is alert and oriented to person, place, and time           Labs reviewed:   Lab Results   Component Value Date    GLUCOSE 105 04/22/2014    BUN 10 03/03/2021    CREATININE 0 61 03/03/2021    CALCIUM 8 5 03/03/2021     04/22/2014    K 3 9 03/03/2021    CO2 25 03/03/2021     03/03/2021     Lab Results   Component Value Date    ALT 22 03/03/2021    AST 18 03/03/2021    ALKPHOS 87 03/03/2021    BILITOT 0 4 04/22/2014       Lab Results   Component Value Date    WBC 10 72 (H) 03/31/2019    HGB 13 1 03/31/2019    HCT 38 0 03/31/2019    MCV 86 03/31/2019     03/31/2019       No results found for: TSH    Lab Results   Component Value Date    CHOL 189 08/18/2015     Lab Results   Component Value Date    TRIG 118 03/03/2021     Lab Results   Component Value Date    HDL 45 03/03/2021     Lab Results   Component Value Date    LDLCALC 118 (H) 03/03/2021     Lab Results   Component Value Date    HGBA1C 5 1 06/12/2018       Results for orders placed or performed in visit on 05/11/21   [address] amplified DNA by PCR    Specimen: Vaginal; Genital   Result Value Ref Range    N gonorrhoeae, DNA Probe Negative Negative    Chlamydia trachomatis, DNA Probe Negative Negative       Imaging reviewed:  No orders to display            Health Maintenance     Health Maintenance   Topic Date Due    OT PLAN OF CARE  08/08/2018    Influenza Vaccine (1) 09/01/2021    Annual Physical  05/11/2022    BMI: Followup Plan  08/02/2022    BMI: Adult  08/02/2022    Cervical Cancer Screening  08/06/2022    DTaP,Tdap,and Td Vaccines (4 - Td or Tdap) 01/08/2029    HIV Screening  Completed    Hepatitis C Screening  Completed    COVID-19 Vaccine  Completed    Pneumococcal Vaccine: Pediatrics (0 to 5 Years) and At-Risk Patients (6 to 59 Years)  Aged Out    HIB Vaccine  Aged Out    Hepatitis B Vaccine  Aged Out    IPV Vaccine  Aged Out    Hepatitis A Vaccine  Aged Out    Meningococcal ACWY Vaccine  Aged Out    HPV Vaccine  Aged [employer] History   Administered Date(s) Administered    INFLUENZA 01/31/2012    Influenza, injectable, quadrivalent, preservative free 0 5 mL 10/16/2018, 11/10/2020    Influenza, seasonal, injectable 03/11/2014, 02/24/2015    SARS-CoV-2 / COVID-19 mRNA IM (Pfizer-BioNTech) 05/05/2021, 05/29/2021    Tdap 01/31/2012, 07/28/2014, 01/08/2019         Kali Khan MD [address]  8/9/2021  8:16 AM    Parts of this note were dictated using M*Modal dictation software

## 2021-09-30 ENCOUNTER — TELEMEDICINE (OUTPATIENT)
Dept: FAMILY MEDICINE CLINIC | Facility: OTHER | Age: 36
End: 2021-09-30
Payer: COMMERCIAL

## 2021-09-30 DIAGNOSIS — I10 ESSENTIAL HYPERTENSION: ICD-10-CM

## 2021-09-30 DIAGNOSIS — R05.3 PERSISTENT DRY COUGH: Primary | ICD-10-CM

## 2021-09-30 PROCEDURE — 99213 OFFICE O/P EST LOW 20 MIN: CPT | Performed by: FAMILY MEDICINE

## 2021-09-30 RX ORDER — AMLODIPINE BESYLATE 5 MG/1
5 TABLET ORAL DAILY
Qty: 30 TABLET | Refills: 1 | Status: SHIPPED | OUTPATIENT
Start: 2021-09-30 | End: 2021-10-28 | Stop reason: DRUGHIGH

## 2021-09-30 NOTE — PROGRESS NOTES
Assessment/Plan:     Diagnoses and all orders for this visit:    Persistent dry cough      -    Likely side effect of Lisinopril given presentation since dose adjustment (from 2 5mg once daily to 5mg once daily) about 2 months ago  -     Will discontinue Lisinopril and begin alternative antihypertensive     -     If cough not improved with discontinuation of Lisinopril, must consider other diagnoses such as GERD or post nasal drip  Essential hypertension  -   BEGIN   amLODIPine (NORVASC) 5 mg tablet; Take 1 tablet (5 mg total) by mouth daily  -   Continue regular monitoring of BP at home and DASH diet  Return in about 2 weeks (around 10/14/2021) for Recheck BP and cough   The patient indicates understanding of these issues and agrees with the plan  Subjective:      Patient ID: Awilda Garcia is a 39 y o  female  Cough  Episode onset: about 2 months ago  The problem has been unchanged  The problem occurs every few minutes  The cough is non-productive  Pertinent negatives include no chest pain, chills, fever, headaches, heartburn, hemoptysis, myalgias, nasal congestion, postnasal drip, rhinorrhea, sore throat, shortness of breath, sweats, weight loss or wheezing  Nothing aggravates the symptoms  She has tried nothing for the symptoms  There is no history of asthma, COPD or environmental allergies  Patient reports that cough presented a few days after her Lisinopril dose was increased about 2 months ago  She reports infrequent heart burn  The following portions of the patient's history were reviewed and updated as appropriate: allergies, current medications, past family history, past medical history, past social history, past surgical history and problem list     Review of Systems   Constitutional: Negative for chills, fever and weight loss  HENT: Negative for postnasal drip, rhinorrhea and sore throat  Respiratory: Positive for cough   Negative for hemoptysis, shortness of breath and wheezing  Cardiovascular: Negative for chest pain  Gastrointestinal: Negative for heartburn  Musculoskeletal: Negative for myalgias  Allergic/Immunologic: Negative for environmental allergies  Neurological: Negative for headaches  Objective: There were no vitals taken for this visit  Physical Exam  Constitutional:       General: She is not in acute distress  Appearance: She is well-developed  She is not ill-appearing, toxic-appearing or diaphoretic  Eyes:      General: No scleral icterus  Right eye: No discharge  Left eye: No discharge  Extraocular Movements: Extraocular movements intact  Conjunctiva/sclera: Conjunctivae normal    Pulmonary:      Effort: Pulmonary effort is normal  No respiratory distress  Comments: No conversational dyspnea     Musculoskeletal:      Cervical back: Normal range of motion  Neurological:      Mental Status: She is alert and oriented to person, place, and time     Psychiatric:         Mood and Affect: Mood normal          Behavior: Behavior normal

## 2021-09-30 NOTE — PROGRESS NOTES
COVID-19 Outpatient Progress Note    Assessment/Plan:    Problem List Items Addressed This Visit     None         COVID-19 Plan     Verification of patient location:    Patient is located in the following state in which I hold an active license {[facility] virtual patient location:[id number]}    Encounter provider Lou Alcala MD    Provider located at [address] [place] [id number]    Recent Visits  No visits were found meeting these conditions  Showing recent visits within past 7 days and meeting all other requirements  Future Appointments  No visits were found meeting these conditions    Showing future appointments within next 150 days and meeting all other requirements     COVID-19 HPI  Lab Results   Component Value Date    SARSCOV2 NOT DETECTED 04/18/2021     Past Medical History:   Diagnosis Date    Candidal intertrigo     Last Assessed:6/14/16    CTS (carpal tunnel syndrome)     Migraine     Migraines     Mild depression ([facility] [address] )     Last Assessed:7/27/17    Obesity     Last Assessed:7/27/17    Varicella     childhood    Vitamin D deficiency     Last Assessed:4/21/16     Past Surgical History:   Procedure Laterality Date    NO PAST SURGERIES       Current Outpatient Medications   Medication Sig Dispense Refill    Acetaminophen (TYLENOL) 325 MG CAPS Take by mouth as needed        hydrOXYzine HCL (ATARAX) 25 mg tablet TAKE 1 TABLET BY MOUTH 3 (THREE) TIMES A DAY AS NEEDED FOR ITCHING, ALLERGIES OR ANXIETY 270 tablet 1    ibuprofen (MOTRIN) 600 mg tablet Take 1 tablet (600 mg total) by mouth every 6 (six) hours as needed for mild pain 30 tablet 0    lisinopril (ZESTRIL) 5 mg tablet Take 1 tablet (5 mg total) by mouth daily 30 tablet 1    norethindrone-ethinyl estradiol-ferrous fumarate (LOESTIN 24 FE) 1-20 MG-MCG(24) per tablet Take 1 tablet by mouth daily 84 tablet 3    SUMAtriptan (IMITREX) 100 mg tablet Take 1 tablet (100 mg total) by mouth once as needed for migraine for up to 1 dose 9 tablet 1     No current facility-administered medications for this visit  Allergies   Allergen Reactions    Iron Palpitations    Milk-Related Compounds - Food Allergy GI Intolerance    Other      Cats and dogs        Review of Systems    Objective: There were no vitals filed for this visit  Physical Exam    VIRTUAL VISIT [address] verbally agrees to participate in [employer]  Pt is aware that [employer] could be limited without vital signs or the ability to perform a full hands-on physical Seven Bless understands she or the provider may request at any time to terminate the video visit and request the patient to seek care or treatment in person

## 2021-10-18 ENCOUNTER — OFFICE VISIT (OUTPATIENT)
Dept: FAMILY MEDICINE CLINIC | Facility: OTHER | Age: 36
End: 2021-10-18
Payer: COMMERCIAL

## 2021-10-18 VITALS
TEMPERATURE: 98.6 F | BODY MASS INDEX: 32.99 KG/M2 | HEIGHT: 65 IN | OXYGEN SATURATION: 97 % | SYSTOLIC BLOOD PRESSURE: 148 MMHG | RESPIRATION RATE: 16 BRPM | HEART RATE: 89 BPM | WEIGHT: 198 LBS | DIASTOLIC BLOOD PRESSURE: 98 MMHG

## 2021-10-18 DIAGNOSIS — Z23 NEED FOR VACCINATION: ICD-10-CM

## 2021-10-18 DIAGNOSIS — I10 ESSENTIAL HYPERTENSION: Primary | ICD-10-CM

## 2021-10-18 DIAGNOSIS — R09.82 POST-NASAL DRIP: ICD-10-CM

## 2021-10-18 PROCEDURE — 90686 IIV4 VACC NO PRSV 0.5 ML IM: CPT

## 2021-10-18 PROCEDURE — 99213 OFFICE O/P EST LOW 20 MIN: CPT | Performed by: FAMILY MEDICINE

## 2021-10-18 PROCEDURE — 90471 IMMUNIZATION ADMIN: CPT

## 2021-10-18 RX ORDER — FLUTICASONE PROPIONATE 50 MCG
2 SPRAY, SUSPENSION (ML) NASAL DAILY
Qty: 11.1 ML | Refills: 1 | Status: SHIPPED | OUTPATIENT
Start: 2021-10-18 | End: 2022-01-20 | Stop reason: ALTCHOICE

## 2021-10-28 ENCOUNTER — OFFICE VISIT (OUTPATIENT)
Dept: FAMILY MEDICINE CLINIC | Facility: OTHER | Age: 36
End: 2021-10-28
Payer: COMMERCIAL

## 2021-10-28 VITALS
OXYGEN SATURATION: 98 % | HEIGHT: 65 IN | RESPIRATION RATE: 16 BRPM | SYSTOLIC BLOOD PRESSURE: 150 MMHG | HEART RATE: 96 BPM | TEMPERATURE: 98 F | WEIGHT: 198 LBS | BODY MASS INDEX: 32.99 KG/M2 | DIASTOLIC BLOOD PRESSURE: 108 MMHG

## 2021-10-28 DIAGNOSIS — I10 ESSENTIAL HYPERTENSION: Primary | ICD-10-CM

## 2021-10-28 PROCEDURE — 3008F BODY MASS INDEX DOCD: CPT | Performed by: FAMILY MEDICINE

## 2021-10-28 PROCEDURE — 3080F DIAST BP >= 90 MM HG: CPT | Performed by: FAMILY MEDICINE

## 2021-10-28 PROCEDURE — 99213 OFFICE O/P EST LOW 20 MIN: CPT | Performed by: FAMILY MEDICINE

## 2021-10-28 PROCEDURE — 3077F SYST BP >= 140 MM HG: CPT | Performed by: FAMILY MEDICINE

## 2021-10-28 PROCEDURE — 1036F TOBACCO NON-USER: CPT | Performed by: FAMILY MEDICINE

## 2021-10-28 RX ORDER — AMLODIPINE BESYLATE 10 MG/1
10 TABLET ORAL DAILY
Qty: 30 TABLET | Refills: 1 | Status: SHIPPED | OUTPATIENT
Start: 2021-10-28

## 2022-01-17 ENCOUNTER — TELEMEDICINE (OUTPATIENT)
Dept: FAMILY MEDICINE CLINIC | Facility: OTHER | Age: 37
End: 2022-01-17
Payer: COMMERCIAL

## 2022-01-17 DIAGNOSIS — U07.1 COVID-19 VIRUS INFECTION: Primary | ICD-10-CM

## 2022-01-17 PROCEDURE — 99213 OFFICE O/P EST LOW 20 MIN: CPT | Performed by: FAMILY MEDICINE

## 2022-01-17 NOTE — PROGRESS NOTES
COVID-19 Outpatient Progress Note    Assessment/Plan:    Problem List Items Addressed This Visit     None      Visit Diagnoses     COVID-19 virus infection    -  Primary  - Patient is a 38 yo vaccinated F who presents on Day 4 of symptom onset reporting continued headaches, fatigue and intermittent diarrhea  - Patient denies any SOB, dyspnea or chest tightness  - RTO x 3 days to re-evaluate symptoms          Disposition:     I recommended self-quarantine for 10 days and to watch for symptoms until 14 days after exposure  If patient were to develop symptoms, they should self isolate and call our office for further guidance  I have spent 10 minutes directly with the patient  Greater than 50% of this time was spent in counseling/coordination of care regarding: instructions for management  Encounter provider Marianna Zhu MD    Provider located at [address] [phone]    Recent Visits  No visits were found meeting these conditions  Showing recent visits within past 7 days and meeting all other requirements  Today's Visits  Date Type Provider Dept   01/17/22 Telemedicine Marianna Zhu MD [facility]   Showing today's visits and meeting all other requirements  Future Appointments  No visits were found meeting these conditions  Showing future appointments within next 150 days and meeting all other requirements     This virtual check-in was done via [employer] and patient was informed that this is a secure, HIPAA-compliant platform  She agrees to proceed  Patient agrees to participate in a virtual check in via telephone or video visit instead of presenting to the office to address urgent/immediate medical needs  Patient is aware this is a billable service  After connecting through [facility], the patient was identified by name and date of birth   Mendez Go was informed that this was a telemedicine visit and that the exam was being conducted confidentially over secure lines  My office door was closed  No one else was in the room  Asha Duff acknowledged consent and understanding of privacy and security of the telemedicine visit  I informed the patient that I have reviewed her record in Epic and presented the opportunity for her to ask any questions regarding the visit today  The patient agreed to participate  Verification of patient location:  Patient is located in the following state in which I hold an active license: [place]    Subjective:   Asha Duff is a 39 y o  female who is concerned about COVID-19  Patient's symptoms include fatigue, diarrhea and headache  Patient denies cough, shortness of breath, nausea and vomiting      - Date of symptom onset: 1/13/2022      COVID-19 vaccination status: Fully vaccinated (primary series)    Exposure:   Contact with a person who is under investigation (PUI) for or who is positive for COVID-19 within the last 14 days?: Yes    Hospitalized recently for fever and/or lower respiratory symptoms?: No      Currently a healthcare worker that is involved in direct patient care?: No      Works in a special setting where the risk of COVID-19 transmission may be high? (this may include long-term care, correctional and long-term facilities; homeless shelters; assisted-living facilities and group homes ): No      Resident in a special setting where the risk of COVID-19 transmission may be high? (this may include long-term care, correctional and long-term facilities; homeless shelters; assisted-living facilities and group homes ): No      COVID-19 results were positive on 1/14/22  She reports both children were also positive  She continues to experience diarrhea, fatigue, and headaches for which she is taking tylenol      Lab Results   Component Value Date    SARSCOV2 NOT DETECTED 10/22/2021     Past Medical History:   Diagnosis Date    Candidal intertrigo     Last Assessed:6/14/16    CTS (carpal tunnel syndrome)     Migraine     Migraines     Mild depression ([facility] [address] )     Last Assessed:7/27/17    Obesity     Last Assessed:7/27/17    Varicella     childhood    Vitamin D deficiency     Last Assessed:4/21/16     Past Surgical History:   Procedure Laterality Date    NO PAST SURGERIES       Current Outpatient Medications   Medication Sig Dispense Refill    Acetaminophen (TYLENOL) 325 MG CAPS Take by mouth as needed        amLODIPine (NORVASC) 10 mg tablet Take 1 tablet (10 mg total) by mouth daily 30 tablet 1    fluticasone (FLONASE) 50 mcg/act nasal spray 2 sprays into each nostril daily 11 1 mL 1    hydrOXYzine HCL (ATARAX) 25 mg tablet TAKE 1 TABLET BY MOUTH 3 (THREE) TIMES A DAY AS NEEDED FOR ITCHING, ALLERGIES OR ANXIETY 270 tablet 1    ibuprofen (MOTRIN) 600 mg tablet Take 1 tablet (600 mg total) by mouth every 6 (six) hours as needed for mild pain 30 tablet 0    norethindrone-ethinyl estradiol-ferrous fumarate (LOESTIN 24 FE) 1-20 MG-MCG(24) per tablet Take 1 tablet by mouth daily 84 tablet 3    SUMAtriptan (IMITREX) 100 mg tablet Take 1 tablet (100 mg total) by mouth once as needed for migraine for up to 1 dose (Patient not taking: Reported on 10/18/2021) 9 tablet 1     No current facility-administered medications for this visit  Allergies   Allergen Reactions    Iron Palpitations    Milk-Related Compounds - Food Allergy GI Intolerance    Other      Cats and dogs        Review of Systems   Constitutional: Positive for fatigue  Respiratory: Negative for cough and shortness of breath  Gastrointestinal: Positive for diarrhea  Negative for nausea and vomiting  Neurological: Positive for headaches  Objective: There were no vitals filed for this visit  Physical Exam  Constitutional:       Appearance: Normal appearance  HENT:      Head: Normocephalic and atraumatic     Eyes:      Conjunctiva/sclera: Conjunctivae normal    Pulmonary:      Comments: No conversational dyspnea  Musculoskeletal:      Cervical back: Normal range of motion and neck supple  Neurological:      Mental Status: She is alert and oriented to person, place, and time  Psychiatric:         Mood and Affect: Mood normal          Behavior: Behavior normal          VIRTUAL VISIT [address] verbally agrees to participate in [employer]  Pt is aware that [employer] could be limited without vital signs or the ability to perform a full hands-on physical Wilfredo Jump understands she or the provider may request at any time to terminate the video visit and request the patient to seek care or treatment in person

## 2022-01-20 ENCOUNTER — TELEMEDICINE (OUTPATIENT)
Dept: FAMILY MEDICINE CLINIC | Facility: OTHER | Age: 37
End: 2022-01-20
Payer: COMMERCIAL

## 2022-01-20 DIAGNOSIS — R06.02 SHORTNESS OF BREATH WITH EXPOSURE TO COVID-19 VIRUS: Primary | ICD-10-CM

## 2022-01-20 DIAGNOSIS — Z20.822 SHORTNESS OF BREATH WITH EXPOSURE TO COVID-19 VIRUS: Primary | ICD-10-CM

## 2022-01-20 PROCEDURE — 99213 OFFICE O/P EST LOW 20 MIN: CPT | Performed by: FAMILY MEDICINE

## 2022-01-20 PROCEDURE — 1036F TOBACCO NON-USER: CPT | Performed by: FAMILY MEDICINE

## 2022-01-20 RX ORDER — BUDESONIDE 180 UG/1
2 AEROSOL, POWDER RESPIRATORY (INHALATION) 2 TIMES DAILY
Qty: 1 EACH | Refills: 0 | Status: SHIPPED | OUTPATIENT
Start: 2022-01-20 | End: 2022-06-16

## 2022-01-20 NOTE — PROGRESS NOTES
Virtual Regular Visit    Verification of patient location:    Patient is located in the following state in which I hold an active license [place]      Assessment/Plan:    Problem List Items Addressed This Visit        Other    Shortness of breath with exposure to COVID-19 virus - Primary     Patient notes new onset SOB with exertion and associated chest tightness that began 2 days ago  She is unable to go up 1 flight of stairs without feeling short of breath  She denies any fevers, chills, chest pain, cough, lightheadedness or dizziness  Plan   - Budesonide 189 mcg Flexhaler 4 puffs BID for 2 weeks  - F/u in 1 week         Relevant Medications    budesonide (Pulmicort Flexhaler) 180 MCG/ACT inhaler               Reason for visit is: recheck, SOB with exertion  Encounter provider Le Shen DO    Provider located at [address] [phone]      Recent Visits  Date Type Provider Dept   01/20/22 [place], [place] Heriberto Garcia   01/17/22 Telemedicine Lonza MD Heriberto Hudson   Showing recent visits within past 7 days and meeting all other requirements  Future Appointments  No visits were found meeting these conditions  Showing future appointments within next 150 days and meeting all other requirements       The patient was identified by name and date of birth  Tammy Han was informed that this is a telemedicine visit and that the visit is being conducted through [facility] and patient was informed this is a secure, HIPAA-complaint platform  She agrees to proceed     My office door was closed  No one else was in the room  She acknowledged consent and understanding of privacy and security of the video platform  The patient has agreed to participate and understands they can discontinue the visit at any time  Patient is aware this is a billable service       Subjective  Tammy Han is a 39 y o  female  Patient presents for follow-up regarding COVID-19 related symptoms that she initially presented with on 1/17  She is now on day 7 since symptom onset initially complaining of HA, fatigue and intermittent diarrhea  She reports resolution of her symptoms except for new onset of SOB with exertion and associated chest tightness  Patient states that these new symptoms just began 2 days ago  No history of asthma  Denies any fever, chills, chest pain, cough, lightheadedness or dizziness  Past Medical History:   Diagnosis Date    Candidal intertrigo     Last Assessed:6/14/16    CTS (carpal tunnel syndrome)     Migraine     Migraines     Mild depression (HCC)     Last Assessed:7/27/17    Obesity     Last Assessed:7/27/17    Varicella     childhood    Vitamin D deficiency     Last Assessed:4/21/16       Past Surgical History:   Procedure Laterality Date    NO PAST SURGERIES         Current Outpatient Medications   Medication Sig Dispense Refill    Acetaminophen (TYLENOL) 325 MG CAPS Take by mouth as needed        amLODIPine (NORVASC) 10 mg tablet Take 1 tablet (10 mg total) by mouth daily 30 tablet 1    budesonide (Pulmicort Flexhaler) 180 MCG/ACT inhaler Inhale 2 puffs 2 (two) times a day Rinse mouth after use  1 each 0    hydrOXYzine HCL (ATARAX) 25 mg tablet TAKE 1 TABLET BY MOUTH 3 (THREE) TIMES A DAY AS NEEDED FOR ITCHING, ALLERGIES OR ANXIETY 270 tablet 1    ibuprofen (MOTRIN) 600 mg tablet Take 1 tablet (600 mg total) by mouth every 6 (six) hours as needed for mild pain 30 tablet 0    norethindrone-ethinyl estradiol-ferrous fumarate (LOESTIN 24 FE) 1-20 MG-MCG(24) per tablet Take 1 tablet by mouth daily 84 tablet 3    SUMAtriptan (IMITREX) 100 mg tablet Take 1 tablet (100 mg total) by mouth once as needed for migraine for up to 1 dose (Patient not taking: Reported on 10/18/2021) 9 tablet 1     No current facility-administered medications for this visit          Allergies Allergen Reactions    Iron Palpitations    Milk-Related Compounds - Food Allergy GI Intolerance    Other      Cats and dogs        Review of Systems   Constitutional: Negative for chills, fatigue and fever  HENT: Negative for congestion and sinus pain  Respiratory: Positive for chest tightness and shortness of breath  Negative for cough and wheezing  Cardiovascular: Negative for chest pain and palpitations  Gastrointestinal: Negative for nausea and vomiting  Neurological: Positive for dizziness and headaches  Negative for weakness and light-headedness  Video Exam    There were no vitals filed for this visit  I spent 20 minutes directly with the patient during this visit    Liam verbally agrees to participate in [employer]  Pt is aware that [employer] could be limited without vital signs or the ability to perform a full hands-on physical Rito Deborah understands she or the provider may request at any time to terminate the video visit and request the patient to seek care or treatment in person

## 2022-01-24 PROBLEM — Z20.822 SHORTNESS OF BREATH WITH EXPOSURE TO COVID-19 VIRUS: Status: ACTIVE | Noted: 2022-01-24

## 2022-01-24 PROBLEM — R06.02 SHORTNESS OF BREATH WITH EXPOSURE TO COVID-19 VIRUS: Status: ACTIVE | Noted: 2022-01-24

## 2022-01-24 NOTE — ASSESSMENT & PLAN NOTE
Patient notes new onset SOB with exertion and associated chest tightness that began 2 days ago  She is unable to go up 1 flight of stairs without feeling short of breath  She denies any fevers, chills, chest pain, cough, lightheadedness or dizziness       Plan   - Budesonide 189 mcg Flexhaler 4 puffs BID for 2 weeks  - F/u in 1 week

## 2022-01-27 ENCOUNTER — TELEMEDICINE (OUTPATIENT)
Dept: FAMILY MEDICINE CLINIC | Facility: OTHER | Age: 37
End: 2022-01-27
Payer: COMMERCIAL

## 2022-01-27 DIAGNOSIS — U07.1 COVID-19 VIRUS INFECTION: Primary | ICD-10-CM

## 2022-01-27 PROCEDURE — 99213 OFFICE O/P EST LOW 20 MIN: CPT | Performed by: FAMILY MEDICINE

## 2022-01-27 NOTE — PROGRESS NOTES
COVID-19 Outpatient Progress Note    Assessment/Plan:    Problem List Items Addressed This Visit     None      Visit Diagnoses     COVID-19 virus infection    -  Primary           Disposition:     -Patient has completed isolation for COVID-19  -She will continue on Budesonide inhaler 4 puffs BID for the next week (chest tightness and shortness of breath improved)  I have spent 11 minutes directly with the patient  No follow-ups on file  The patient indicates understanding of these issues and agrees with the plan  Encounter provider Ramón Etienne MD    Provider located at [address] [address]    Recent Visits  Date Type Provider Dept   01/27/22 Telemedicine Ramón Etienne MD [facility]   Showing recent visits within past 7 days and meeting all other requirements  Future Appointments  No visits were found meeting these conditions  Showing future appointments within next 150 days and meeting all other requirements     This virtual check-in was done via [employer]    and patient was informed that this is not a secure, HIPAA-compliant platform  She agrees to proceed  Patient agrees to participate in a virtual check in via telephone or video visit instead of presenting to the office to address urgent/immediate medical needs  Patient is aware this is a billable service  After connecting through [facility], the patient was identified by name and date of birth  Celso Finley was informed that this was a telemedicine visit and that the exam was being conducted confidentially over secure lines  My office door was closed  No one else was in the room  Celso Finley acknowledged consent and understanding of privacy and security of the telemedicine visit  I informed the patient that I have reviewed her record in Epic and presented the opportunity for her to ask any questions regarding the visit today   The patient agreed to participate  Verification of patient location:  Patient is located in the following state in which I hold an active license: [place]    Subjective:   Abrahan Sexton is a 39 y o  female who has been screened for COVID-19  Patient's symptoms include cough (  improving) and myalgias (  improving)  Patient denies fever, chills, congestion, rhinorrhea, sore throat, anosmia, loss of taste, shortness of breath, chest tightness, nausea, vomiting, diarrhea and headaches  - Date of symptom onset: 1/13/2022      COVID-19 vaccination status: Fully vaccinated (primary series)    Lab Results   Component Value Date    SARSCOV2 NOT DETECTED 01/18/2022     Past Medical History:   Diagnosis Date    Candidal intertrigo     Last Assessed:6/14/16    CTS (carpal tunnel syndrome)     Migraine     Migraines     Mild depression ([facility] [address] )     Last Assessed:7/27/17    Obesity     Last Assessed:7/27/17    Varicella     childhood    Vitamin D deficiency     Last Assessed:4/21/16     Past Surgical History:   Procedure Laterality Date    NO PAST SURGERIES       Current Outpatient Medications   Medication Sig Dispense Refill    Acetaminophen (TYLENOL) 325 MG CAPS Take by mouth as needed        amLODIPine (NORVASC) 10 mg tablet Take 1 tablet (10 mg total) by mouth daily 30 tablet 1    budesonide (Pulmicort Flexhaler) 180 MCG/ACT inhaler Inhale 2 puffs 2 (two) times a day Rinse mouth after use   1 each 0    hydrOXYzine HCL (ATARAX) 25 mg tablet TAKE 1 TABLET BY MOUTH 3 (THREE) TIMES A DAY AS NEEDED FOR ITCHING, ALLERGIES OR ANXIETY 270 tablet 1    ibuprofen (MOTRIN) 600 mg tablet Take 1 tablet (600 mg total) by mouth every 6 (six) hours as needed for mild pain 30 tablet 0    norethindrone-ethinyl estradiol-ferrous fumarate (LOESTIN 24 FE) 1-20 MG-MCG(24) per tablet Take 1 tablet by mouth daily 84 tablet 3    SUMAtriptan (IMITREX) 100 mg tablet Take 1 tablet (100 mg total) by mouth once as needed for migraine for up to 1 dose (Patient not taking: Reported on 10/18/2021) 9 tablet 1     No current facility-administered medications for this visit  Allergies   Allergen Reactions    Iron Palpitations    Milk-Related Compounds - Food Allergy GI Intolerance    Other      Cats and dogs        Review of Systems   Constitutional: Negative for chills and fever  HENT: Negative for congestion, rhinorrhea and sore throat  Respiratory: Positive for cough (  improving)  Negative for chest tightness and shortness of breath  Gastrointestinal: Negative for diarrhea, nausea and vomiting  Musculoskeletal: Positive for myalgias (  improving)  Neurological: Negative for headaches  Objective: There were no vitals filed for this visit  Physical Exam  Constitutional:       General: She is not in acute distress  Appearance: Normal appearance  She is not ill-appearing, toxic-appearing or diaphoretic  Eyes:      General: No scleral icterus  Right eye: No discharge  Left eye: No discharge  Extraocular Movements: Extraocular movements intact  Conjunctiva/sclera: Conjunctivae normal    Pulmonary:      Effort: Pulmonary effort is normal  No respiratory distress  Comments: -no conversational dyspnea appreciated   Neurological:      Mental Status: She is alert  Psychiatric:         Mood and Affect: Mood normal          Behavior: Behavior normal          VIRTUAL VISIT [id number] verbally agrees to participate in [employer]  Pt is aware that [employer] could be limited without vital signs or the ability to perform a full hands-on physical Samia Alberto understands she or the provider may request at any time to terminate the video visit and request the patient to seek care or treatment in person

## 2022-06-16 ENCOUNTER — ANNUAL EXAM (OUTPATIENT)
Dept: OBGYN CLINIC | Facility: CLINIC | Age: 37
End: 2022-06-16
Payer: COMMERCIAL

## 2022-06-16 VITALS
WEIGHT: 204 LBS | HEIGHT: 66 IN | DIASTOLIC BLOOD PRESSURE: 84 MMHG | BODY MASS INDEX: 32.78 KG/M2 | SYSTOLIC BLOOD PRESSURE: 118 MMHG

## 2022-06-16 DIAGNOSIS — Z12.4 SCREENING FOR MALIGNANT NEOPLASM OF CERVIX: ICD-10-CM

## 2022-06-16 DIAGNOSIS — Z11.51 SCREENING FOR HUMAN PAPILLOMAVIRUS (HPV): ICD-10-CM

## 2022-06-16 DIAGNOSIS — Z01.419 WELL WOMAN EXAM WITH ROUTINE GYNECOLOGICAL EXAM: Primary | ICD-10-CM

## 2022-06-16 DIAGNOSIS — Z01.419 ENCOUNTER FOR GYNECOLOGICAL EXAMINATION WITHOUT ABNORMAL FINDING: ICD-10-CM

## 2022-06-16 DIAGNOSIS — N93.9 ABNORMAL UTERINE BLEEDING: ICD-10-CM

## 2022-06-16 PROCEDURE — 3079F DIAST BP 80-89 MM HG: CPT | Performed by: PHYSICIAN ASSISTANT

## 2022-06-16 PROCEDURE — G0476 HPV COMBO ASSAY CA SCREEN: HCPCS | Performed by: PHYSICIAN ASSISTANT

## 2022-06-16 PROCEDURE — G0145 SCR C/V CYTO,THINLAYER,RESCR: HCPCS | Performed by: PHYSICIAN ASSISTANT

## 2022-06-16 PROCEDURE — 3008F BODY MASS INDEX DOCD: CPT | Performed by: PHYSICIAN ASSISTANT

## 2022-06-16 PROCEDURE — 99395 PREV VISIT EST AGE 18-39: CPT | Performed by: PHYSICIAN ASSISTANT

## 2022-06-16 PROCEDURE — 3074F SYST BP LT 130 MM HG: CPT | Performed by: PHYSICIAN ASSISTANT

## 2022-06-16 PROCEDURE — 1036F TOBACCO NON-USER: CPT | Performed by: PHYSICIAN ASSISTANT

## 2022-06-16 RX ORDER — NORETHINDRONE ACETATE AND ETHINYL ESTRADIOL AND FERROUS FUMARATE 1MG-20(24)
1 KIT ORAL DAILY
Qty: 84 TABLET | Refills: 3 | Status: SHIPPED | OUTPATIENT
Start: 2022-06-16

## 2022-06-17 LAB
HPV HR 12 DNA CVX QL NAA+PROBE: NEGATIVE
HPV16 DNA CVX QL NAA+PROBE: NEGATIVE
HPV18 DNA CVX QL NAA+PROBE: NEGATIVE

## 2022-06-22 LAB
LAB AP GYN PRIMARY INTERPRETATION: NORMAL
Lab: NORMAL

## 2023-02-09 ENCOUNTER — APPOINTMENT (OUTPATIENT)
Dept: URGENT CARE | Age: 38
End: 2023-02-09

## 2023-02-14 ENCOUNTER — HOSPITAL ENCOUNTER (OUTPATIENT)
Dept: RADIOLOGY | Facility: HOSPITAL | Age: 38
Discharge: HOME | End: 2023-02-14
Attending: FAMILY MEDICINE

## 2023-02-14 ENCOUNTER — APPOINTMENT (OUTPATIENT)
Dept: RADIOLOGY | Facility: MEDICAL CENTER | Age: 38
End: 2023-02-14

## 2023-02-14 ENCOUNTER — APPOINTMENT (OUTPATIENT)
Dept: URGENT CARE | Facility: MEDICAL CENTER | Age: 38
End: 2023-02-14

## 2023-02-14 DIAGNOSIS — M54.2 NECK PAIN: ICD-10-CM

## 2023-02-14 DIAGNOSIS — M54.41 CHRONIC LEFT-SIDED LOW BACK PAIN WITH BILATERAL SCIATICA: ICD-10-CM

## 2023-02-14 DIAGNOSIS — M54.42 CHRONIC LEFT-SIDED LOW BACK PAIN WITH BILATERAL SCIATICA: ICD-10-CM

## 2023-02-14 DIAGNOSIS — M54.2 NECK PAIN: Primary | ICD-10-CM

## 2023-02-14 DIAGNOSIS — G89.29 CHRONIC LEFT-SIDED LOW BACK PAIN WITH BILATERAL SCIATICA: ICD-10-CM

## 2023-02-15 ENCOUNTER — OFFICE VISIT (OUTPATIENT)
Dept: FAMILY MEDICINE CLINIC | Facility: OTHER | Age: 38
End: 2023-02-15

## 2023-02-15 VITALS
DIASTOLIC BLOOD PRESSURE: 94 MMHG | OXYGEN SATURATION: 98 % | WEIGHT: 202 LBS | SYSTOLIC BLOOD PRESSURE: 142 MMHG | RESPIRATION RATE: 18 BRPM | HEART RATE: 82 BPM | HEIGHT: 66 IN | BODY MASS INDEX: 32.47 KG/M2 | TEMPERATURE: 98 F

## 2023-02-15 DIAGNOSIS — Z00.00 ANNUAL PHYSICAL EXAM: Primary | ICD-10-CM

## 2023-02-15 DIAGNOSIS — I10 ESSENTIAL HYPERTENSION: ICD-10-CM

## 2023-02-15 DIAGNOSIS — G89.29 CHRONIC NONINTRACTABLE HEADACHE, UNSPECIFIED HEADACHE TYPE: ICD-10-CM

## 2023-02-15 DIAGNOSIS — E55.9 VITAMIN D DEFICIENCY: ICD-10-CM

## 2023-02-15 DIAGNOSIS — Z13.6 SCREENING FOR CARDIOVASCULAR CONDITION: ICD-10-CM

## 2023-02-15 DIAGNOSIS — F41.9 ANXIETY: ICD-10-CM

## 2023-02-15 DIAGNOSIS — L50.9 HIVES: ICD-10-CM

## 2023-02-15 DIAGNOSIS — M54.2 NECK PAIN: ICD-10-CM

## 2023-02-15 DIAGNOSIS — N64.4 BREAST PAIN: ICD-10-CM

## 2023-02-15 DIAGNOSIS — R51.9 CHRONIC NONINTRACTABLE HEADACHE, UNSPECIFIED HEADACHE TYPE: ICD-10-CM

## 2023-02-15 DIAGNOSIS — Z13.1 SCREENING FOR DIABETES MELLITUS: ICD-10-CM

## 2023-02-15 RX ORDER — AMLODIPINE BESYLATE 10 MG/1
10 TABLET ORAL DAILY
Qty: 30 TABLET | Refills: 1 | Status: SHIPPED | OUTPATIENT
Start: 2023-02-15

## 2023-02-15 RX ORDER — NAPROXEN 500 MG/1
500 TABLET ORAL 2 TIMES DAILY WITH MEALS
Qty: 28 TABLET | Refills: 0 | Status: SHIPPED | OUTPATIENT
Start: 2023-02-15

## 2023-02-15 RX ORDER — HYDROXYZINE HYDROCHLORIDE 25 MG/1
25 TABLET, FILM COATED ORAL 3 TIMES DAILY
Qty: 270 TABLET | Refills: 1 | Status: SHIPPED | OUTPATIENT
Start: 2023-02-15

## 2023-02-15 NOTE — PROGRESS NOTES
ADULT ANNUAL [address]  [facility]    NAME: Mai Weiner  AGE: 40 y o  SEX: female  : 1985     DATE: 2/15/2023     Assessment and Plan:     Problem List Items Addressed This Visit        Cardiovascular and Mediastinum    Essential hypertension    Relevant Medications    amLODIPine (NORVASC) 10 mg tablet    Other Relevant Orders    Lipid panel    Comprehensive metabolic panel    CBC and Platelet    TSH, 3rd generation with Free T4 reflex   Other Visit Diagnoses     Annual physical exam    -  Primary    Screening for diabetes mellitus        Screening for cardiovascular condition        Relevant Orders    Lipid panel    Vitamin D deficiency        Relevant Orders    Vitamin D 25 hydroxy    BMI 33 0-33 9,adult        Relevant Orders    TSH, 3rd generation with Free T4 reflex    Hives        Relevant Medications    hydrOXYzine HCL (ATARAX) 25 mg tablet    Chronic nonintractable headache, unspecified headache type        Relevant Orders    TSH, 3rd generation with Free T4 reflex    Neck pain        Relevant Medications    naproxen (Naprosyn) 500 mg tablet    Breast pain        Relevant Orders    US breast left limited (diagnostic)    US breast right limited (diagnostic)    Anxiety              Immunizations and preventive care screenings were discussed with patient today  Appropriate education was printed on patient's after visit summary  Counseling:  Alcohol/drug use: discussed moderation in alcohol intake, the recommendations for healthy alcohol use, and avoidance of illicit drug use  Dental Health: discussed importance of regular tooth brushing, flossing, and dental visits  Injury prevention: discussed safety/seat belts, safety helmets, smoke detectors, carbon dioxide detectors, and smoking near bedding or upholstery    Sexual health: discussed sexually transmitted diseases, partner selection, use of condoms, avoidance of unintended pregnancy, and contraceptive alternatives  · Exercise: the importance of regular exercise/physical activity was discussed  Recommend exercise 3-5 times per week for at least 30 minutes  BMI Counseling: Body mass index is 33 43 kg/m²  The BMI is above normal  Nutrition recommendations include encouraging healthy choices of fruits and vegetables, decreasing fast food intake, consuming healthier snacks and limiting drinks that contain sugar  Exercise recommendations include exercising 3-5 times per week  Patient referred to PCP  Rationale for BMI follow-up plan is due to patient being overweight or obese  Emotional and Mental Well-being, Sleep, Connectedness Assessment and Intervention:    Sleep/stress assessment performed    Depression and anxiety screening performed and reviewed      Tobacco and Toxic Substance Assessment and Intervention:     Tobacco use screening performed    Alcohol and drug use screening performed        Return in about 2 months (around 4/15/2023), or Review labs, Recheck BP, for Next scheduled follow up             Kenny Shields is a 40year old female wih chronic essential hypertension and anxiety, who has not been taking her medication      - Hypertension:    - BP elevated today, but not severe range    - CBC, CMP, TSH    - Resume amlodipine 10 mg daily    - Recheck BP at return visit in 8 weeks    - Consider changing OCP at next visit   - Consider BP journal at next visit     - Headaches:    - Chronic, likely exacerbated 2/2 uncontrolled HTN    - Control of BP should help relieve headaches    - Consider further headache workup if no improvement at next visit    - Consider changing OCP at next visit    - Consider headache journal at next visit     - Neck pain:    - Will follow up on results of X-Ray taken yesterday    - Naproxen 500 BID for 14 day     - Breast pain:    - Physical exam reassuring    - Bilateral breast ultrasounds     - Anxiety:    - Resume previously prescribed atarax 25mg PRN, as patient reports it helped her symptoms    - Labs:   - CBC to evaluate HTN and for anemia   - CMP to evaluate liver function, kidney function, DM, electrolytes   - TSH to evaluate thyroid status considering obesity, HTN     - Vit D to evaluate perviously identified deficiency    - Lipids to evaluate for HLD, CVD      Follow up in 8 weeks with return visit to reassess symptoms and review labs  Chief Complaint:     Chief Complaint   Patient presents with   • Physical Exam   • Blood Pressure Check     Has been off her BP medication for a few months pt states      History of Present Illness:     Adult Annual Physical   Patient here for a comprehensive physical exam  The patient reports the following problems:     Patient stopped taking her anti-HTN medication several months ago  She reports life has gotten in the way and she recognizes the importance of taking the medication  Headaches the last 2-3 weeks  Pounding in the back of her head  Pressure  Hurts to lay head on pillow  12/10 in severity  Nearly every day  Does not have a headache today  Was taking tylenol for the pain, with relief for only a few hours  Hurt her neck 3 weeks ago at work  Diagnosis was spasm  Counseled to use gentle stretches  Pain has improved  3/10 in severity  Limited ROM in neck  Only taking tylenol for the pain  Also endorses chronic pain in her breasts for the last several months  She reports this pain tends to come and go, but has not been associated with her menstrual cycle  The pain is located mostly where her bra band sits  She has not changed bras  She has not attempted to palpate any breast masses  She denies any family history of breast cancer, uterine cancer, or ovarian cancer  Diet and Physical Activity  · Diet/Nutrition: lots of vegetables, less soidum, no more caffeine, no soda  · Exercise: no formal exercise and but works at an [employer]        Depression Screening  PHQ-2/9 Depression Screening    Little interest or pleasure in doing things: 0 - not at all  Feeling down, depressed, or hopeless: 0 - not at all       General Health  · Sleep: gets 7-8 hours of sleep on average  · Hearing: normal - bilateral  · Vision: no vision problems  · Dental: no dental visits for >1 year  /GYN Health  · Last menstrual period: approximately 1/17/23  · Regular, every 4 weeks, last 3-4 days, not very heavy   · Does get severe headaches the day before or first day of period   · Contraceptive method: oral contraceptives  Prescribed by OBGYN   · History of STDs?: yes - does not remember what it was      Review of Systems:     Review of Systems   Constitutional: Positive for fatigue  Negative for chills, fever and unexpected weight change  HENT: Negative for ear pain, rhinorrhea, sinus pressure, sinus pain and sore throat  Eyes: Negative for pain and visual disturbance  Respiratory: Negative for cough and shortness of breath  Cardiovascular: Positive for palpitations  Negative for chest pain  Gastrointestinal: Negative for abdominal pain, blood in stool, constipation, diarrhea and vomiting  Endocrine: Positive for polydipsia and polyuria  Genitourinary: Negative for difficulty urinating, dysuria, hematuria and menstrual problem  Musculoskeletal: Negative for arthralgias and back pain  Skin: Negative for color change and rash  Allergic/Immunologic: Positive for food allergies  Neurological: Positive for headaches  Negative for seizures and syncope  Hematological: Does not bruise/bleed easily  Psychiatric/Behavioral: Negative for sleep disturbance  The patient is not nervous/anxious  All other systems reviewed and are negative       Past Medical History:     Past Medical History:   Diagnosis Date   • CTS (carpal tunnel syndrome)    • Migraines    • Mild depression     Last Assessed:7/27/17   • Obesity     Last Assessed:7/27/17   • Varicella     childhood   • Vitamin D deficiency Last Assessed:4/21/16      Past Surgical History:     Past Surgical History:   Procedure Laterality Date   • NO PAST SURGERIES        Social History:     Social History     Socioeconomic History   • Marital status: /Civil Union     Spouse name: None   • Number of children: 1   • Years of education: None   • Highest education level: None   Occupational History   • Occupation:    Tobacco Use   • Smoking status: Never   • Smokeless tobacco: Never   Vaping Use   • Vaping Use: Never used   Substance and Sexual Activity   • Alcohol use: No   • Drug use: Never   • Sexual activity: Yes     Partners: Male     Birth control/protection: OCP   Other Topics Concern   • None   Social History Narrative    Attempting to conceive    Non-smoker     No know smoke exposure     No alcohol use     No illicit drug     Seatbelt use     Exercises sometimes     Has one child     Lives with Family              Social Determinants of Health     Financial Resource Strain: Not on file   Food Insecurity: Not on file   Transportation Needs: Not on file   Physical Activity: Not on file   Stress: Not on file   Social Connections: Not on file   Intimate Partner Violence: Not on file   Housing Stability: Not on file      Family History:     Family History   Problem Relation Age of Onset   • Hypertension Mother    • Vitamin D deficiency Mother    • Gout Father    • Hypertension Father    • Migraines Sister    • Vitamin D deficiency Sister    • Vitamin D deficiency Brother    • Leukemia Brother    • No Known Problems Maternal Grandmother    • Diabetes Maternal Grandfather         type 2   • Alzheimer's disease Maternal Grandfather    • Alzheimer's disease Paternal Grandmother    • No Known Problems Son    • No Known Problems Son    • Stroke Paternal Uncle       Current Medications:     Current Outpatient Medications   Medication Sig Dispense Refill   • Acetaminophen 325 MG CAPS Take by mouth as needed       • amLODIPine (NORVASC) 10 mg tablet Take 1 tablet (10 mg total) by mouth daily 30 tablet 1   • hydrOXYzine HCL (ATARAX) 25 mg tablet Take 1 tablet (25 mg total) by mouth 3 (three) times a day 270 tablet 1   • ibuprofen (MOTRIN) 600 mg tablet Take 1 tablet (600 mg total) by mouth every 6 (six) hours as needed for mild pain 30 tablet 0   • naproxen (Naprosyn) 500 mg tablet Take 1 tablet (500 mg total) by mouth 2 (two) times a day with meals 28 tablet 0   • norethindrone-ethinyl estradiol-ferrous fumarate (LOESTIN 24 FE) 1-20 MG-MCG(24) per tablet Take 1 tablet by mouth daily 84 tablet 3     No current facility-administered medications for this visit  Allergies: Allergies   Allergen Reactions   • Iron Palpitations   • Milk-Related Compounds - Food Allergy GI Intolerance   • Other      Cats and dogs       Physical Exam:     /94   Pulse 82   Temp 98 °F (36 7 °C)   Resp 18   Ht 5' 5 5" (1 664 m)   Wt 91 6 kg (202 lb)   SpO2 98%   BMI 33 10 kg/m²     Physical Exam  Vitals reviewed  Constitutional:       General: She is not in acute distress  Appearance: Normal appearance  She is well-developed  She is not ill-appearing or toxic-appearing  HENT:      Head: Normocephalic and atraumatic  Right Ear: There is no impacted cerumen  Left Ear: There is no impacted cerumen  Nose: No congestion or rhinorrhea  Mouth/Throat:      Mouth: Mucous membranes are moist    Eyes:      General:         Right eye: No discharge  Left eye: No discharge  Cardiovascular:      Rate and Rhythm: Normal rate and regular rhythm  Pulses: Normal pulses  Heart sounds: Normal heart sounds  No murmur heard  No friction rub  No gallop  Pulmonary:      Effort: Pulmonary effort is normal  No respiratory distress  Breath sounds: Normal breath sounds  No wheezing, rhonchi or rales  Chest:      Chest wall: Tenderness present  No mass or deformity  Breasts:     Right: Tenderness present   No swelling, bleeding, inverted nipple, mass, nipple discharge or skin change  Left: Tenderness present  No swelling, bleeding, inverted nipple, mass, nipple discharge or skin change  Comments: Tenderness to palpation along inferior and lateral breast border bilaterally   Abdominal:      General: Abdomen is flat  Bowel sounds are normal       Palpations: Abdomen is soft  Tenderness: There is no abdominal tenderness  There is no guarding  Musculoskeletal:         General: No swelling or tenderness  Cervical back: Neck supple  Lymphadenopathy:      Cervical: No cervical adenopathy  Skin:     General: Skin is warm and dry  Capillary Refill: Capillary refill takes less than 2 seconds  Coloration: Skin is not jaundiced  Findings: No erythema  Neurological:      Mental Status: She is alert  Motor: No weakness  Psychiatric:         Mood and Affect: Mood normal          Behavior: Behavior normal          Thought Content:  Thought content normal          Judgment: Judgment normal           Jennifer Rausch MD   [place] [id number]

## 2023-02-15 NOTE — PATIENT INSTRUCTIONS
Please don't eat for 8-10 hours prior to getting your labwork  Plan:   - labwork   - Breast ultrasounds   - Naproxen for 2 weeks, twice a day  - Restart blood pressure medicine (amlodipine)           Wellness Visit for Adults   AMBULATORY CARE:   A wellness visit  is when you see your healthcare provider to get screened for health problems  Your healthcare provider will also give you advice on how to stay healthy  Write down your questions so you remember to ask them  Ask your healthcare provider how often you should have a wellness visit  What happens at a wellness visit:  Your healthcare provider will ask about your health, and your family history of health problems  This includes high blood pressure, heart disease, and cancer  He or she will ask if you have symptoms that concern you, if you smoke, and about your mood  You may also be asked about your intake of medicines, supplements, food, and alcohol  Any of the following may be done: Your weight  will be checked  Your height may also be checked so your body mass index (BMI) can be calculated  Your BMI shows if you are at a healthy weight  Your blood pressure  and heart rate will be checked  Your temperature may also be checked  Blood and urine tests  may be done  Blood tests may be done to check your cholesterol levels  Abnormal cholesterol levels increase your risk for heart disease and stroke  You may also need a blood or urine test to check for diabetes if you are at increased risk  Urine tests may be done to look for signs of an infection or kidney disease  A physical exam  includes checking your heartbeat and lungs with a stethoscope  Your healthcare provider may also check your skin to look for sun damage  Screening tests  may be recommended  A screening test is done to check for diseases that may not cause symptoms  The screening tests you may need depend on your age, gender, family history, and lifestyle habits   For example, colorectal screening may be recommended if you are 48years old or older  Screening tests you need if you are a woman:   A Pap smear  is used to screen for cervical cancer  Pap smears are usually done every 3 to 5 years depending on your age  You may need them more often if you have had abnormal Pap smear test results in the past  Ask your healthcare provider how often you should have a Pap smear  A mammogram  is an x-ray of your breasts to screen for breast cancer  Experts recommend mammograms every 2 years starting at age 48 years  You may need a mammogram at age 52 years or younger if you have an increased risk for breast cancer  Talk to your healthcare provider about when you should start having mammograms and how often you need them  Vaccines you may need:   Get an influenza vaccine  every year  The influenza vaccine protects you from the flu  Several types of viruses cause the flu  The viruses change over time, so new vaccines are made each year  Get a tetanus-diphtheria (Td) booster vaccine  every 10 years  This vaccine protects you against tetanus and diphtheria  Tetanus is a severe infection that may cause painful muscle spasms and lockjaw  Diphtheria is a severe bacterial infection that causes a thick covering in the back of your mouth and throat  Get a human papillomavirus (HPV) vaccine  if you are female and aged 23 to 32 or male 23 to 24 and never received it  This vaccine protects you from HPV infection  HPV is the most common infection spread by sexual contact  HPV may also cause vaginal, penile, and anal cancers  Get a pneumococcal vaccine  if you are aged 72 years or older  The pneumococcal vaccine is an injection given to protect you from pneumococcal disease  Pneumococcal disease is an infection caused by pneumococcal bacteria  The infection may cause pneumonia, meningitis, or an ear infection  Get a shingles vaccine  if you are 60 or older, even if you have had shingles before   The shingles vaccine is an injection to protect you from the varicella-zoster virus  This is the same virus that causes chickenpox  Shingles is a painful rash that develops in people who had chickenpox or have been exposed to the virus  How to eat healthy:  My Plate is a model for planning healthy meals  It shows the types and amounts of foods that should go on your plate  Fruits and vegetables make up about half of your plate, and grains and protein make up the other half  A serving of dairy is included on the side of your plate  The amount of calories and serving sizes you need depends on your age, gender, weight, and height  Examples of healthy foods are listed below:  Eat a variety of vegetables  such as dark green, red, and orange vegetables  You can also include canned vegetables low in sodium (salt) and frozen vegetables without added butter or sauces  Eat a variety of fresh fruits , canned fruit in 100% juice, frozen fruit, and dried fruit  Include whole grains  At least half of the grains you eat should be whole grains  Examples include whole-wheat bread, wheat pasta, brown rice, and whole-grain cereals such as oatmeal     Eat a variety of protein foods such as seafood (fish and shellfish), lean meat, and poultry without skin (turkey and chicken)  Examples of lean meats include pork leg, shoulder, or tenderloin, and beef round, sirloin, tenderloin, and extra lean ground beef  Other protein foods include eggs and egg substitutes, beans, peas, soy products, nuts, and seeds  Choose low-fat dairy products such as skim or 1% milk or low-fat yogurt, cheese, and cottage cheese  Limit unhealthy fats  such as butter, hard margarine, and shortening  Exercise:  Exercise at least 30 minutes per day on most days of the week  Some examples of exercise include walking, biking, dancing, and swimming   You can also fit in more physical activity by taking the stairs instead of the elevator or parking farther away from stores  Include muscle strengthening activities 2 days each week  Regular exercise provides many health benefits  It helps you manage your weight, and decreases your risk for type 2 diabetes, heart disease, stroke, and high blood pressure  Exercise can also help improve your mood  Ask your healthcare provider about the best exercise plan for you  General health and safety guidelines:   Do not smoke  Nicotine and other chemicals in cigarettes and cigars can cause lung damage  Ask your healthcare provider for information if you currently smoke and need help to quit  E-cigarettes or smokeless tobacco still contain nicotine  Talk to your healthcare provider before you use these products  Limit alcohol  A drink of alcohol is 12 ounces of beer, 5 ounces of wine, or 1½ ounces of liquor  Lose weight, if needed  Being overweight increases your risk of certain health conditions  These include heart disease, high blood pressure, type 2 diabetes, and certain types of cancer  Protect your skin  Do not sunbathe or use tanning beds  Use sunscreen with a SPF 15 or higher  Apply sunscreen at least 15 minutes before you go outside  Reapply sunscreen every 2 hours  Wear protective clothing, hats, and sunglasses when you are outside  Drive safely  Always wear your seatbelt  Make sure everyone in your car wears a seatbelt  A seatbelt can save your life if you are in an accident  Do not use your cell phone when you are driving  This could distract you and cause an accident  Pull over if you need to make a call or send a text message  Practice safe sex  Use latex condoms if are sexually active and have more than one partner  Your healthcare provider may recommend screening tests for sexually transmitted infections (STIs)  Wear helmets, lifejackets, and protective gear  Always wear a helmet when you ride a bike or motorcycle, go skiing, or play sports that could cause a head injury   Wear protective equipment when you play sports  Wear a lifejacket when you are on a boat or doing water sports  © Copyright [address] 2022 Information is for End User's use only and may not be sold, redistributed or otherwise used for commercial purposes  All illustrations and images included in CareNotes® are the copyrighted property of A D A M [employer] Inc  or Marcus Rosario   The above information is an  only  It is not intended as medical advice for individual conditions or treatments  Talk to your doctor, nurse or pharmacist before following any medical regimen to see if it is safe and effective for you

## 2023-02-26 LAB
25(OH)D3 SERPL-MCNC: 17 NG/ML (ref 30–100)
ALBUMIN SERPL-MCNC: 4.2 G/DL (ref 3.6–5.1)
ALBUMIN/GLOB SERPL: 1.2 (CALC) (ref 1–2.5)
ALP SERPL-CCNC: 87 U/L (ref 31–125)
ALT SERPL-CCNC: 12 U/L (ref 6–29)
AST SERPL-CCNC: 18 U/L (ref 10–30)
BILIRUB SERPL-MCNC: 0.5 MG/DL (ref 0.2–1.2)
BUN SERPL-MCNC: 10 MG/DL (ref 7–25)
BUN/CREAT SERPL: NORMAL (CALC) (ref 6–22)
CALCIUM SERPL-MCNC: 8.9 MG/DL (ref 8.6–10.2)
CHLORIDE SERPL-SCNC: 105 MMOL/L (ref 98–110)
CHOLEST SERPL-MCNC: 201 MG/DL
CHOLEST/HDLC SERPL: 4.1 (CALC)
CO2 SERPL-SCNC: 24 MMOL/L (ref 20–32)
CREAT SERPL-MCNC: 0.53 MG/DL (ref 0.5–0.97)
ERYTHROCYTE [DISTWIDTH] IN BLOOD BY AUTOMATED COUNT: 12.4 % (ref 11–15)
GFR/BSA.PRED SERPLBLD CYS-BASED-ARV: 122 ML/MIN/1.73M2
GLOBULIN SER CALC-MCNC: 3.5 G/DL (CALC) (ref 1.9–3.7)
GLUCOSE SERPL-MCNC: 90 MG/DL (ref 65–99)
HCT VFR BLD AUTO: 45.7 % (ref 35–45)
HDLC SERPL-MCNC: 49 MG/DL
HGB BLD-MCNC: 15.3 G/DL (ref 11.7–15.5)
LDLC SERPL CALC-MCNC: 122 MG/DL (CALC)
MCH RBC QN AUTO: 27.6 PG (ref 27–33)
MCHC RBC AUTO-ENTMCNC: 33.5 G/DL (ref 32–36)
MCV RBC AUTO: 82.5 FL (ref 80–100)
NONHDLC SERPL-MCNC: 152 MG/DL (CALC)
PLATELET # BLD AUTO: 359 THOUSAND/UL (ref 140–400)
PMV BLD REES-ECKER: 10.7 FL (ref 7.5–12.5)
POTASSIUM SERPL-SCNC: 4 MMOL/L (ref 3.5–5.3)
PROT SERPL-MCNC: 7.7 G/DL (ref 6.1–8.1)
RBC # BLD AUTO: 5.54 MILLION/UL (ref 3.8–5.1)
SODIUM SERPL-SCNC: 138 MMOL/L (ref 135–146)
TRIGL SERPL-MCNC: 188 MG/DL
TSH SERPL-ACNC: 1.13 MIU/L
WBC # BLD AUTO: 7.4 THOUSAND/UL (ref 3.8–10.8)

## 2023-02-28 DIAGNOSIS — E55.9 VITAMIN D DEFICIENCY: Primary | ICD-10-CM

## 2023-02-28 RX ORDER — MELATONIN
1000 DAILY
Qty: 90 TABLET | Refills: 0 | Status: SHIPPED | OUTPATIENT
Start: 2023-02-28 | End: 2023-05-29

## 2023-03-22 ENCOUNTER — OFFICE VISIT (OUTPATIENT)
Dept: FAMILY MEDICINE CLINIC | Facility: OTHER | Age: 38
End: 2023-03-22

## 2023-03-22 VITALS
TEMPERATURE: 98.2 F | OXYGEN SATURATION: 99 % | WEIGHT: 204 LBS | HEART RATE: 86 BPM | RESPIRATION RATE: 14 BRPM | SYSTOLIC BLOOD PRESSURE: 124 MMHG | BODY MASS INDEX: 32.78 KG/M2 | DIASTOLIC BLOOD PRESSURE: 90 MMHG | HEIGHT: 66 IN

## 2023-03-22 DIAGNOSIS — M79.2 NERVE PAIN: ICD-10-CM

## 2023-03-22 DIAGNOSIS — E55.9 VITAMIN D DEFICIENCY: ICD-10-CM

## 2023-03-22 DIAGNOSIS — R21 RASH: ICD-10-CM

## 2023-03-22 DIAGNOSIS — R60.9 EDEMA, UNSPECIFIED TYPE: Primary | ICD-10-CM

## 2023-03-22 RX ORDER — TRIAMCINOLONE ACETONIDE 0.25 MG/G
CREAM TOPICAL 2 TIMES DAILY PRN
Qty: 80 G | Refills: 1 | Status: SHIPPED | OUTPATIENT
Start: 2023-03-22

## 2023-03-22 RX ORDER — ERGOCALCIFEROL 1.25 MG/1
50000 CAPSULE ORAL WEEKLY
Qty: 8 CAPSULE | Refills: 0 | Status: SHIPPED | OUTPATIENT
Start: 2023-03-22

## 2023-03-22 RX ORDER — LORATADINE 10 MG/1
10 TABLET ORAL DAILY
Qty: 30 TABLET | Refills: 1 | Status: SHIPPED | OUTPATIENT
Start: 2023-03-22

## 2023-03-22 RX ORDER — HYDROCHLOROTHIAZIDE 12.5 MG/1
12.5 TABLET ORAL DAILY
Qty: 30 TABLET | Refills: 1 | Status: SHIPPED | OUTPATIENT
Start: 2023-03-22

## 2023-03-22 RX ORDER — TRIAMCINOLONE ACETONIDE 1 MG/G
CREAM TOPICAL
COMMUNITY
Start: 2023-03-18

## 2023-03-22 NOTE — PATIENT INSTRUCTIONS
- Start taking water pill every day  Do not take right before bed  Medicine is called hydrochlorothiazide  Continue taking amlodipine      - Start taking allergy pill every morning  Claritin is sent to your pharmacy  Over the counter is also fine if your insurance doesn't cover it  Ok to keep taking your atarax  - Stop your daily Vit D for now  Take new Vit D ONCE PER WEEK   (50,000 units)

## 2023-03-22 NOTE — PROGRESS NOTES
Name: Moraima Paige      : 1985      MRN: [id number]  Encounter Provider: Christian Fortune MD  Encounter Date: 3/22/2023   Encounter department: [address] [place] [address] Dr Zamudio  Edema, unspecified type  -     [facility] Allergy Panel, Adult; Future  -     ОЛЬГА Screen w/ Reflex to Titer/Pattern; Future  -     C-reactive protein; Future  -     Sedimentation rate, automated; Future  -     Food Allergy Profile; Future  -     CBC and differential; Future  -     Celiac HLA-DQ,blood; Future  -     hydrochlorothiazide (HYDRODIURIL) 12 5 mg tablet; Take 1 tablet (12 5 mg total) by mouth daily  -     loratadine (CLARITIN) 10 mg tablet; Take 1 tablet (10 mg total) by mouth daily  -     triamcinolone (KENALOG) 0 025 % cream; Apply topically 2 (two) times a day as needed for rash  -     ОЛЬГА Screen w/ Reflex to Titer/Pattern  -     C-reactive protein  -     Sedimentation rate, automated  -     CBC and differential    2  Rash  -     [facility] Allergy Panel, Adult; Future  -     ОЛЬГА Screen w/ Reflex to Titer/Pattern; Future  -     C-reactive protein; Future  -     Sedimentation rate, automated; Future  -     Food Allergy Profile; Future  -     CBC and differential; Future  -     Celiac HLA-DQ,blood; Future  -     hydrochlorothiazide (HYDRODIURIL) 12 5 mg tablet; Take 1 tablet (12 5 mg total) by mouth daily  -     loratadine (CLARITIN) 10 mg tablet; Take 1 tablet (10 mg total) by mouth daily  -     triamcinolone (KENALOG) 0 025 % cream; Apply topically 2 (two) times a day as needed for rash  -     ОЛЬГА Screen w/ Reflex to Titer/Pattern  -     C-reactive protein  -     Sedimentation rate, automated  -     CBC and differential    3  Nerve pain  -     [facility] Allergy Panel, Adult; Future  -     ОЛЬГА Screen w/ Reflex to Titer/Pattern; Future  -     C-reactive protein; Future  -     Sedimentation rate, automated; Future  -     Food Allergy Profile;  Future  -     CBC and differential; Future  - Celiac HLA-DQ,blood; Future  -     hydrochlorothiazide (HYDRODIURIL) 12 5 mg tablet; Take 1 tablet (12 5 mg total) by mouth daily  -     loratadine (CLARITIN) 10 mg tablet; Take 1 tablet (10 mg total) by mouth daily  -     ОЛЬГА Screen w/ Reflex to Titer/Pattern  -     C-reactive protein  -     Sedimentation rate, automated  -     CBC and differential    4  Vitamin D deficiency  -     ergocalciferol (VITAMIN D2) 50,000 units; Take 1 capsule (50,000 Units total) by mouth once a week     40-year-old female with a past medical history of chronic migraines, essential hypertension, and hives presenting with intermittent rash and persistent bilateral lower leg edema  Etiology of rash likely allergic considering response to topical steroid treatment  Low suspicion for infection  Without rash present on presentation today difficult to assess  Plan:  - [place] allergy panel  - Celiac panel  - Food allergy panel  - ESR  - CRP  - ОЛЬГА  - Claritin or other OTC antihistamine daily  - Hydrochlorothiazide for diuresis of edema  -Triamcinolone cream as needed    Patient to schedule same-day sick visit next time rash erupts  Subjective      Patient presents today with concerns of a rash  Symptoms began 3 weeks ago, but patient wanted to see a particular provider and was limited by provider availability  Patient reports 3 weeks ago her feet began swelling on a Wednesday  On Thursday she started experiencing burning pain in both of her legs followed by eruption of a red petechial rash on each leg  Rash resolved with steroid cream, but swelling persisted  The rash was resolved for approximately 1 week before it recurred  During the second occurrence the rash was circumferential around her left lower leg with bright red erythema  Rash was again associated with burning and itching  There was no vesicular eruption, scale, blistering, or pustule formation    She again treated the rash with topical steroid cream  Rash has nearly completely faded away but edema persists  Patient reports that the burning pain was so severe she could not even tolerate the feeling of her socks on her skin  She denies changing any soaps or detergents  She is unaware of any recent new foods or diet changes  Patient brought pictures of rash on her phone today  Review of Systems   Musculoskeletal: Positive for myalgias  Skin: Positive for color change and rash  Allergic/Immunologic: Positive for environmental allergies  Negative for immunocompromised state  Current Outpatient Medications on File Prior to Visit   Medication Sig   • Acetaminophen 325 MG CAPS Take by mouth as needed     • amLODIPine (NORVASC) 10 mg tablet Take 1 tablet (10 mg total) by mouth daily   • cholecalciferol (VITAMIN D3) 1,000 units tablet Take 1 tablet (1,000 Units total) by mouth daily   • hydrOXYzine HCL (ATARAX) 25 mg tablet Take 1 tablet (25 mg total) by mouth 3 (three) times a day   • ibuprofen (MOTRIN) 600 mg tablet Take 1 tablet (600 mg total) by mouth every 6 (six) hours as needed for mild pain   • naproxen (Naprosyn) 500 mg tablet Take 1 tablet (500 mg total) by mouth 2 (two) times a day with meals   • norethindrone-ethinyl estradiol-ferrous fumarate (LOESTIN 24 FE) 1-20 MG-MCG(24) per tablet Take 1 tablet by mouth daily   • triamcinolone (KENALOG) 0 1 % cream APPLY TO AFFECTED AREA 2 OR 3 TIMES DAILY AS NEEDED       Objective     /90   Pulse 86   Temp 98 2 °F (36 8 °C)   Resp 14   Ht 5' 5 5" (1 664 m)   Wt 92 5 kg (204 lb)   SpO2 99%   BMI 33 43 kg/m²     Physical Exam  Vitals reviewed  Constitutional:       General: She is not in acute distress  Appearance: She is not ill-appearing, toxic-appearing or diaphoretic  HENT:      Head: Normocephalic and atraumatic  Nose: Nose normal  No congestion or rhinorrhea  Mouth/Throat:      Mouth: Mucous membranes are moist    Eyes:      General: No scleral icterus          Right eye: No discharge  Left eye: No discharge  Conjunctiva/sclera: Conjunctivae normal    Cardiovascular:      Rate and Rhythm: Normal rate and regular rhythm  Pulses: Normal pulses  Heart sounds: Normal heart sounds  No murmur heard  No friction rub  No gallop  Pulmonary:      Effort: Pulmonary effort is normal  No respiratory distress  Breath sounds: Normal breath sounds  No stridor  No wheezing, rhonchi or rales  Chest:      Chest wall: No tenderness  Abdominal:      General: Bowel sounds are normal  There is no distension  Palpations: Abdomen is soft  Tenderness: There is no abdominal tenderness  There is no guarding  Musculoskeletal:      Right lower le+ Edema present  Left lower le+ Edema present  Comments: Patchy area of circumferential skin darkening around the right lower leg   Skin:     General: Skin is warm and dry  Capillary Refill: Capillary refill takes less than 2 seconds  Findings: Lesion present  Neurological:      Mental Status: She is alert  Motor: No weakness  Gait: Gait normal    Psychiatric:         Mood and Affect: Mood normal          Behavior: Behavior normal          Thought Content:  Thought content normal          Judgment: Judgment normal           Oskar Saini MD   [facility] PGY-1

## 2023-03-23 ENCOUNTER — OFFICE VISIT (OUTPATIENT)
Dept: FAMILY MEDICINE CLINIC | Facility: OTHER | Age: 38
End: 2023-03-23

## 2023-03-23 VITALS
SYSTOLIC BLOOD PRESSURE: 106 MMHG | RESPIRATION RATE: 18 BRPM | DIASTOLIC BLOOD PRESSURE: 80 MMHG | BODY MASS INDEX: 32.72 KG/M2 | WEIGHT: 203.6 LBS | HEIGHT: 66 IN | TEMPERATURE: 97.8 F | HEART RATE: 88 BPM | OXYGEN SATURATION: 98 %

## 2023-03-23 DIAGNOSIS — E78.2 MODERATE MIXED HYPERLIPIDEMIA NOT REQUIRING STATIN THERAPY: ICD-10-CM

## 2023-03-23 DIAGNOSIS — I10 ESSENTIAL HYPERTENSION: Primary | ICD-10-CM

## 2023-03-23 NOTE — PATIENT INSTRUCTIONS
Let us recheck your cholesterol in about 6 months  I have sent an order to check it sometime after September  We can go over your labwork at that time as well  Try to do moderate exercise once a week for 30 minutes  Can consider home exercises like body weight squats 10 repetitions, 3 sets on days you aren't working at the [employer]  Continue avoiding sugary drinks as much as possible like you are already doing  Keep avoiding energy drinks  Try carbonated water if you are craving carbonation (some brands are [employer], [employer])  Allow yourself to keep seeing your friends without feeling too guilty  Continue getting about 8 hours of good sleep a night  Lifestyle Medicine Tip Sheet    Eat predominantly less processed foods such as fast food, [employer] V  dinners, and martinez  Eat Close to [employer], [employer] or [employer])    Eat a predominantly plant based diet   Dark Leafy Greens  Fruits/Vegetables  Whole Grains: Whole wheat, barely, wheat berries, quinoa, steel cut oats, brown rice, whole wheat pasta  Legumes: kidney beans, pozo beans, white beans, black beans, garbanzo beans (chickpeas), lima beans (mature, dried), split peas, lentils, and edamame (green soybeans)      At least half of the plate should contain fruits or vegetables        Liquid should be predominantly water  (limit soda and juice)    Watch portion size  Foods you should avoid or limit? Fats - Specifically saturated and trans-fats  They are found in margarines, many fast foods, and some store-bought baked goods  Saturated and Trans-fats can raise your cholesterol level and your chance of getting heart disease  When you cook, it's best to use no oils but if needed try to limit the amount of oil used as oil contains many calories per volume and is very unhealthy when heated during cooking  Sugar -Limit or avoid sugar, sweets, and refined grains   Refined grains are found in white bread, white rice, most forms of pasta, and most packaged "snack" foods  Try not to cook with salt and avoid  adding extra salt to your  meals   Meat - Studies have shown that eating a lot of red meat and poultry can increase your risk of certain health problems, including heart disease, diabetes, obesity and cancer  So try to limit the intake of it  Practice good sleep hygiene by getting 7-9 hours of sleep a night    Daily exercise minimum of 30 minutes (walking around the block)    Socialization (friends and family)   Explore your neighborhood  Go to the park, spend time at [employer]  Consider taking a class or volunteering to connect with new people    If you are interested you can read more about healthy food choices at the following websites:  Nutritionfacts  org  Home cooking recipes: https://www [employer] com/  https://www [facility] edu/nutritionsource/  Familydoctor  org

## 2023-03-23 NOTE — PROGRESS NOTES
Lifestyle Medicine Office Visit  Curly Duverney 40 y o  female   MRN: [id number] : 1985  ENCOUNTER: 3/23/23  11:18 AM    Assessment and Plan   No problem-specific Assessment & Plan notes found for this encounter  44-year-old female with past medical history of HTN, obesity, vitamin D insufficiency and HLD presents for lifestyle medicine appointment  Focused on diet and exercise today  Extensively discussed dietary and exercise strategies  Patient has appointment for blood pressure recheck in April, doing well on amlodipine 10 mg qd  Patient to continue lifestyle modifications with approximately 5 to 6-month follow-up with lipid panel recheck  /80 in office  Reason for Visit     Chief Complaint   Patient presents with   • Lifestyle medicine       History of Present Illness   Curly Duverney is a 40y o -year-old female with pmhx HTN, obesity, vitamin d insufficiency, hyperlipidemia who presents today for lifestyle medicine visit  Patient was recommended for further counseling after having mildly elevated cholesterol and recently placed on blood pressure medication again  Today we are focusing on the diet and exercise tenets of lifestyle medicine  Patient notes that she is ceased to have any more headaches now that she is taking her blood pressure medications  Patient has been having chronic issues with dizziness once in a while (once/week) when lifting heavy objects at work that spontaneously resolves quickly  Denies any falls or loss of consciousness recently  Sleep  Averaged 7-8h/24h over the past week  Sleeps well, uninterrupted sleep  Weight Perception  Wishes to lose weight  Goal 170 lbs  Wishes to lose 10-15 lbs by the summer due to going on vacation  Nutrition  Eats an average of 3-5 servings of whole fruits and vegetables each day  Patient typically boils veggies or add them to her soups  Patient has been avoiding adding too much salt to her cooked food    Denies using a lot of butter or oil with cooking/frying  Patient typically eats 2 square meals a day, with sometimes a light breakfast - typically consisting of fruit or yogurt with coffee  Patient indulges in fast food about once every 2 weeks  Patient reports eating low-fat Yoplait yogurt about once every 2 weeks  Patient eats eggs once every 2 weeks  Patient snacks on cookies, typically 1-2 at a time, very sparingly, noting that she only had a total of 2 Chips-a-Hoy M&M cookies last week  Liquids: Patient enjoys 1 cup of coffee nearly once a day  Patient sometimes drinks insta-coffee with some half-and-half or some flavoring  Otherwise she typically drinks coffee black with 1 to 2 tablespoons of sugar  Patient used to drink soda once every other day but has recently reduced intake to about once a week  Patient enjoys the carbonation of soda that she has found to sometimes help with bloating and reflux issues  She also used to drink red bull about twice a week  Patient drinks alcohol socially typically 1 mixed drink when with friends - about 1-2 times a month  Appears patient has made good changes since last visit  Exercise  No formal exercise  Due to taking care of children and working 6 days a week patient has little time for formal exercise  Exercised moderately 0 days per week over the past two weeks    Patient works 6 days a week, 3 days a week at a [employer] and 3 days a week at [employer]  Patient is constantly moving and on her feet  During her shifts at the [employer] patient has recorded daily steps of 18,000-21,000 steps  She is also required to repetitively lift up to 50 pounds  Patient notes that 6 years ago when she started she lost about 20 pounds pretty fast   Patient would ideally like to weigh 170 pounds  She weighs 203 pounds today  Patient used to be pant size 10-12 and now back to 16, slowly gaining weight over the past 2 summers    Patient attributes weight gain due to recent marriage separation and increased responsibilities of taking care of her 2 children and working 2 jobs  [employer]  Patient clearly cares for her 2 children  Her mother also helps with childbearing  Patient continues to keep up with her friends  Patient is very excited to go to [place] in the summer for 5 weeks  She has not been back since 5 years ago  Smoking and Substance Use  Nicotine: No  Alcohol: Occasionally as noted above    Motivation  Salena Dejesus is most motivated to change for health improvement -specifically to control her blood pressure and cholesterol levels so that she may feel better and be healthy to take care of her children  Rigoberto Gallagher's reason for change is self health and family  Review of Systems   Review of Systems   Constitutional: Negative for activity change, appetite change and unexpected weight change  Neurological: Positive for dizziness  Negative for light-headedness and headaches  Active Problem List     Patient Active Problem List   Diagnosis   • Chronic migraine without aura without status migrainosus, not intractable   • Carpal tunnel syndrome, bilateral   • Obesity   • Essential hypertension   • Sleep-disordered breathing   • Adjustment disorder with depressed mood   • Shortness of breath with exposure to COVID-19 virus       Past Medical History, Past Surgical History, Family History, and Social History were reviewed and updated today as appropriate  Objective   /80   Pulse 88   Temp 97 8 °F (36 6 °C)   Resp 18   Ht 5' 5 5" (1 664 m)   Wt 92 4 kg (203 lb 9 6 oz)   SpO2 98%   BMI 33 37 kg/m²     Physical Exam  Constitutional:       General: She is not in acute distress  Appearance: Normal appearance  HENT:      Head: Normocephalic and atraumatic  Nose: Nose normal    Eyes:      Conjunctiva/sclera: Conjunctivae normal    Pulmonary:      Effort: No respiratory distress     Neurological:      Mental Status: She is alert and oriented to person, place, and time     Psychiatric:         Mood and Affect: Mood normal          Behavior: Behavior normal          Pertinent Laboratory/Diagnostic Studies:  Lab Results   Component Value Date    GLUCOSE 105 04/22/2014    BUN 10 02/25/2023    CREATININE 0 53 02/25/2023    CALCIUM 8 9 02/25/2023     04/22/2014    K 4 0 02/25/2023    CO2 24 02/25/2023     02/25/2023       No results found for: TSH    Lab Results   Component Value Date    CHOL 189 08/18/2015     Lab Results   Component Value Date    TRIG 188 (H) 02/25/2023     Lab Results   Component Value Date    HDL 49 (L) 02/25/2023     Lab Results   Component Value Date    LDLCALC 122 (H) 02/25/2023     Lab Results   Component Value Date    HGBA1C 5 1 06/12/2018       Results for orders placed or performed in visit on 02/15/23   Lipid panel   Result Value Ref Range    Total Cholesterol 201 (H) <200 mg/dL    HDL 49 (L) > OR = 50 mg/dL    Triglycerides 188 (H) <150 mg/dL    LDL Calculated 122 (H) mg/dL (calc)    Chol HDLC Ratio 4 1 <5 0 (calc)    Non-HDL Cholesterol 152 (H) <130 mg/dL (calc)   Comprehensive metabolic panel   Result Value Ref Range    Glucose, Random 90 65 - 99 mg/dL    BUN 10 7 - 25 mg/dL    Creatinine 0 53 0 50 - 0 97 mg/dL    eGFR 122 > OR = 60 mL/min/1 73m2    SL AMB BUN/CREATININE RATIO NOT APPLICABLE 6 - 22 (calc)    Sodium 138 135 - 146 mmol/L    Potassium 4 0 3 5 - 5 3 mmol/L    Chloride 105 98 - 110 mmol/L    CO2 24 20 - 32 mmol/L    Calcium 8 9 8 6 - 10 2 mg/dL    Protein, Total 7 7 6 1 - 8 1 g/dL    Albumin 4 2 3 6 - 5 1 g/dL    Globulin 3 5 1 9 - 3 7 g/dL (calc)    Albumin/Globulin Ratio 1 2 1 0 - 2 5 (calc)    TOTAL BILIRUBIN 0 5 0 2 - 1 2 mg/dL    Alkaline Phosphatase 87 31 - 125 U/L    AST 18 10 - 30 U/L    ALT 12 6 - 29 U/L   CBC and Platelet   Result Value Ref Range    White Blood Cell Count 7 4 3 8 - 10 8 Thousand/uL    Red Blood Cell Count 5 54 (H) 3 80 - 5 10 Million/uL Hemoglobin 15 3 11 7 - 15 5 g/dL    HCT 45 7 (H) 35 0 - 45 0 %    MCV 82 5 80 0 - 100 0 fL    MCH 27 6 27 0 - 33 0 pg    MCHC 33 5 32 0 - 36 0 g/dL    RDW 12 4 11 0 - 15 0 %    Platelet Count 301 209 - 400 Thousand/uL    SL AMB MPV 10 7 7 5 - 12 5 fL   Vitamin D 25 hydroxy   Result Value Ref Range    Vitamin D, 25-Hydroxy, Serum 17 (L) 30 - 100 ng/mL   TSH, 3rd generation with Free T4 reflex   Result Value Ref Range    TSH W/RFX TO FREE T4 1 13 mIU/L       Orders Placed This Encounter   Procedures   • Lipid Panel with Direct LDL reflex         Current Medications     Current Outpatient Medications   Medication Sig Dispense Refill   • Acetaminophen 325 MG CAPS Take by mouth as needed       • amLODIPine (NORVASC) 10 mg tablet Take 1 tablet (10 mg total) by mouth daily 90 tablet 1   • cholecalciferol (VITAMIN D3) 1,000 units tablet Take 1 tablet (1,000 Units total) by mouth daily 90 tablet 0   • ergocalciferol (VITAMIN D2) 50,000 units Take 1 capsule (50,000 Units total) by mouth once a week 8 capsule 0   • hydrochlorothiazide (HYDRODIURIL) 12 5 mg tablet Take 1 tablet (12 5 mg total) by mouth daily 30 tablet 1   • hydrOXYzine HCL (ATARAX) 25 mg tablet Take 1 tablet (25 mg total) by mouth 3 (three) times a day 270 tablet 1   • ibuprofen (MOTRIN) 600 mg tablet Take 1 tablet (600 mg total) by mouth every 6 (six) hours as needed for mild pain 30 tablet 0   • loratadine (CLARITIN) 10 mg tablet Take 1 tablet (10 mg total) by mouth daily 30 tablet 1   • naproxen (Naprosyn) 500 mg tablet Take 1 tablet (500 mg total) by mouth 2 (two) times a day with meals 28 tablet 0   • norethindrone-ethinyl estradiol-ferrous fumarate (LOESTIN 24 FE) 1-20 MG-MCG(24) per tablet Take 1 tablet by mouth daily 84 tablet 3   • triamcinolone (KENALOG) 0 025 % cream Apply topically 2 (two) times a day as needed for rash 80 g 1   • triamcinolone (KENALOG) 0 1 % cream APPLY TO AFFECTED AREA 2 OR 3 TIMES DAILY AS NEEDED       No current facility-administered medications for this visit  ALLERGIES:  Allergies   Allergen Reactions   • Iron Palpitations   • Milk-Related Compounds - Food Allergy GI Intolerance   • Other      Cats and dogs        Nutrition Assessment and Intervention:     Reviewed and updated Nutrition Prescription    Online resources such as NutritionFacts  Deborah Nunnery  com, plantstrong com, pcrm  [employer], or similar provided to patient      Other interventions: Patient would like to attempt her own lifestyle changes before thinking about weight management and nutrition referral     Physical Activity Assessment and Intervention:    Physical Activity Prescription completed with patient      Other interventions: Patient is getting a lot of steps in at work  Due to time constraints, recommending at least 30 minutes moderate exercise a week  Recommend light home exercises such as body squats 10 reps x 3 sets 3 days a week  Emotional and Mental Well-being, Sleep, Connectedness Assessment and Intervention:    Sleep/stress assessment performed      Tobacco and Toxic Substance Assessment and Intervention:     Alcohol and drug use screening performed        Becki Gallegos MD   [address]  3/23/2023    Parts of this note were dictated using [employer] dictation software and may have sounds-like errors due to variation in pronunciation

## 2023-04-01 ENCOUNTER — APPOINTMENT (OUTPATIENT)
Dept: LAB | Facility: CLINIC | Age: 38
End: 2023-04-01

## 2023-04-01 DIAGNOSIS — M79.2 NERVE PAIN: ICD-10-CM

## 2023-04-01 DIAGNOSIS — R60.9 EDEMA, UNSPECIFIED TYPE: ICD-10-CM

## 2023-04-01 DIAGNOSIS — R21 RASH: ICD-10-CM

## 2023-04-01 LAB
BASOPHILS # BLD AUTO: 0.02 THOUSANDS/ÂΜL (ref 0–0.1)
BASOPHILS NFR BLD AUTO: 0 % (ref 0–1)
CRP SERPL QL: 7.7 MG/L
EOSINOPHIL # BLD AUTO: 0.12 THOUSAND/ÂΜL (ref 0–0.61)
EOSINOPHIL NFR BLD AUTO: 2 % (ref 0–6)
ERYTHROCYTE [DISTWIDTH] IN BLOOD BY AUTOMATED COUNT: 12.1 % (ref 11.6–15.1)
ERYTHROCYTE [SEDIMENTATION RATE] IN BLOOD: 22 MM/HOUR (ref 0–19)
HCT VFR BLD AUTO: 45.7 % (ref 34.8–46.1)
HGB BLD-MCNC: 14.9 G/DL (ref 11.5–15.4)
IMM GRANULOCYTES # BLD AUTO: 0.02 THOUSAND/UL (ref 0–0.2)
IMM GRANULOCYTES NFR BLD AUTO: 0 % (ref 0–2)
LYMPHOCYTES # BLD AUTO: 1.45 THOUSANDS/ÂΜL (ref 0.6–4.47)
LYMPHOCYTES NFR BLD AUTO: 20 % (ref 14–44)
MCH RBC QN AUTO: 27.1 PG (ref 26.8–34.3)
MCHC RBC AUTO-ENTMCNC: 32.6 G/DL (ref 31.4–37.4)
MCV RBC AUTO: 83 FL (ref 82–98)
MONOCYTES # BLD AUTO: 0.54 THOUSAND/ÂΜL (ref 0.17–1.22)
MONOCYTES NFR BLD AUTO: 8 % (ref 4–12)
NEUTROPHILS # BLD AUTO: 5.01 THOUSANDS/ÂΜL (ref 1.85–7.62)
NEUTS SEG NFR BLD AUTO: 70 % (ref 43–75)
NRBC BLD AUTO-RTO: 0 /100 WBCS
PLATELET # BLD AUTO: 320 THOUSANDS/UL (ref 149–390)
PMV BLD AUTO: 9.3 FL (ref 8.9–12.7)
RBC # BLD AUTO: 5.49 MILLION/UL (ref 3.81–5.12)
WBC # BLD AUTO: 7.16 THOUSAND/UL (ref 4.31–10.16)

## 2023-04-03 LAB — ANA SER QL IA: NEGATIVE

## 2023-04-05 LAB
A ALTERNATA IGE QN: <0.1 KUA/I
A FUMIGATUS IGE QN: <0.1 KUA/I
ALMOND IGE QN: <0.1 KUA/I
BERMUDA GRASS IGE QN: <0.1 KUA/I
BOXELDER IGE QN: <0.1 KUA/I
C HERBARUM IGE QN: <0.1 KUA/I
CASHEW NUT IGE QN: <0.1 KUA/I
CAT DANDER IGE QN: 0.89 KUA/I
CMN PIGWEED IGE QN: <0.1 KUA/I
CODFISH IGE QN: <0.1 KUA/I
COMMON RAGWEED IGE QN: <0.1 KUA/I
COTTONWOOD IGE QN: <0.1 KUA/I
D FARINAE IGE QN: 19.4 KUA/I
D PTERONYSS IGE QN: 18.7 KUA/I
DOG DANDER IGE QN: 33.7 KUA/I
EGG WHITE IGE QN: <0.1 KUA/I
GLUTEN IGE QN: <0.1 KUA/I
HAZELNUT IGE QN: 0.35 KUA/L
LONDON PLANE IGE QN: <0.1 KUA/I
MILK IGE QN: <0.1 KUA/I
MOUSE URINE PROT IGE QN: <0.1 KUA/I
MT JUNIPER IGE QN: <0.1 KUA/I
MUGWORT IGE QN: <0.1 KUA/I
P NOTATUM IGE QN: <0.1 KUA/I
PEANUT IGE QN: <0.1 KUA/I
ROACH IGE QN: 0.19 KUA/I
SALMON IGE QN: <0.1 KUA/I
SCALLOP IGE QN: 0.12 KUA/L
SESAME SEED IGE QN: <0.1 KUA/I
SHEEP SORREL IGE QN: <0.1 KUA/I
SHRIMP IGE QN: 0.26 KUA/L
SILVER BIRCH IGE QN: 0.72 KUA/I
SOYBEAN IGE QN: <0.1 KUA/I
TIMOTHY IGE QN: <0.1 KUA/I
TOTAL IGE SMQN RAST: 392 KU/L (ref 0–113)
TOTAL IGE SMQN RAST: 404 KU/L (ref 0–113)
TUNA IGE QN: 0.26 KUA/I
WALNUT IGE QN: <0.1 KUA/I
WALNUT IGE QN: <0.1 KUA/I
WHEAT IGE QN: <0.1 KUA/I
WHITE ASH IGE QN: <0.1 KUA/I
WHITE ELM IGE QN: <0.1 KUA/I
WHITE MULBERRY IGE QN: <0.1 KUA/I
WHITE OAK IGE QN: <0.1 KUA/I

## 2023-05-02 DIAGNOSIS — N93.9 ABNORMAL UTERINE BLEEDING: ICD-10-CM

## 2023-05-02 DIAGNOSIS — Z01.419 ENCOUNTER FOR GYNECOLOGICAL EXAMINATION WITHOUT ABNORMAL FINDING: ICD-10-CM

## 2023-05-02 RX ORDER — NORETHINDRONE ACETATE AND ETHINYL ESTRADIOL 1MG-20(24)
KIT ORAL
Qty: 84 TABLET | Refills: 3 | Status: SHIPPED | OUTPATIENT
Start: 2023-05-02

## 2023-05-05 DIAGNOSIS — E55.9 VITAMIN D DEFICIENCY: ICD-10-CM

## 2023-05-08 RX ORDER — ERGOCALCIFEROL 1.25 MG/1
CAPSULE ORAL
Qty: 8 CAPSULE | Refills: 0 | Status: SHIPPED | OUTPATIENT
Start: 2023-05-08

## 2023-05-22 DIAGNOSIS — E55.9 VITAMIN D DEFICIENCY: ICD-10-CM

## 2023-05-22 RX ORDER — ERGOCALCIFEROL 1.25 MG/1
CAPSULE ORAL
Qty: 12 CAPSULE | Refills: 1 | Status: SHIPPED | OUTPATIENT
Start: 2023-05-22

## 2023-05-29 DIAGNOSIS — E55.9 VITAMIN D DEFICIENCY: ICD-10-CM

## 2023-05-30 RX ORDER — MELATONIN
Qty: 90 TABLET | Refills: 0 | Status: SHIPPED | OUTPATIENT
Start: 2023-05-30

## 2023-05-30 NOTE — TELEPHONE ENCOUNTER
Detail Level: Detailed Not our pt! Add 71338 Cpt? (Important Note: In 2017 The Use Of 72030 Is Being Tracked By Cms To Determine Future Global Period Reimbursement For Global Periods): yes

## 2023-06-21 DIAGNOSIS — R21 RASH: ICD-10-CM

## 2023-06-21 DIAGNOSIS — R60.9 EDEMA, UNSPECIFIED TYPE: ICD-10-CM

## 2023-06-21 RX ORDER — TRIAMCINOLONE ACETONIDE 0.25 MG/G
CREAM TOPICAL
Qty: 80 G | Refills: 1 | Status: SHIPPED | OUTPATIENT
Start: 2023-06-21

## 2023-08-02 DIAGNOSIS — L50.9 HIVES: ICD-10-CM

## 2023-08-02 RX ORDER — HYDROXYZINE HYDROCHLORIDE 25 MG/1
25 TABLET, FILM COATED ORAL 3 TIMES DAILY
Qty: 100 TABLET | Refills: 5 | Status: SHIPPED | OUTPATIENT
Start: 2023-08-02

## 2023-08-27 DIAGNOSIS — I10 ESSENTIAL HYPERTENSION: ICD-10-CM

## 2023-08-27 DIAGNOSIS — E55.9 VITAMIN D DEFICIENCY: ICD-10-CM

## 2023-08-28 RX ORDER — MELATONIN
Qty: 90 TABLET | Refills: 0 | Status: SHIPPED | OUTPATIENT
Start: 2023-08-28

## 2023-08-28 RX ORDER — AMLODIPINE BESYLATE 10 MG/1
10 TABLET ORAL DAILY
Qty: 90 TABLET | Refills: 1 | Status: SHIPPED | OUTPATIENT
Start: 2023-08-28

## 2023-09-17 DIAGNOSIS — E55.9 VITAMIN D DEFICIENCY: ICD-10-CM

## 2023-09-18 RX ORDER — ERGOCALCIFEROL 1.25 MG/1
CAPSULE ORAL
Qty: 12 CAPSULE | Refills: 1 | Status: SHIPPED | OUTPATIENT
Start: 2023-09-18

## 2023-11-07 ENCOUNTER — OFFICE VISIT (OUTPATIENT)
Dept: FAMILY MEDICINE CLINIC | Facility: OTHER | Age: 38
End: 2023-11-07
Payer: COMMERCIAL

## 2023-11-07 ENCOUNTER — LAB (OUTPATIENT)
Dept: LAB | Facility: CLINIC | Age: 38
End: 2023-11-07
Payer: COMMERCIAL

## 2023-11-07 VITALS
WEIGHT: 208 LBS | DIASTOLIC BLOOD PRESSURE: 70 MMHG | HEIGHT: 66 IN | BODY MASS INDEX: 33.43 KG/M2 | SYSTOLIC BLOOD PRESSURE: 118 MMHG | HEART RATE: 91 BPM | RESPIRATION RATE: 18 BRPM | OXYGEN SATURATION: 98 % | TEMPERATURE: 98.2 F

## 2023-11-07 DIAGNOSIS — I87.8 VENOUS STASIS: ICD-10-CM

## 2023-11-07 DIAGNOSIS — R01.1 MURMUR, CARDIAC: ICD-10-CM

## 2023-11-07 DIAGNOSIS — R89.9 ABNORMAL LABORATORY TEST: ICD-10-CM

## 2023-11-07 DIAGNOSIS — I10 ESSENTIAL HYPERTENSION: ICD-10-CM

## 2023-11-07 DIAGNOSIS — E78.2 MODERATE MIXED HYPERLIPIDEMIA NOT REQUIRING STATIN THERAPY: ICD-10-CM

## 2023-11-07 DIAGNOSIS — R21 RASH: ICD-10-CM

## 2023-11-07 DIAGNOSIS — R21 RASH: Primary | ICD-10-CM

## 2023-11-07 DIAGNOSIS — B35.3 TINEA PEDIS OF BOTH FEET: ICD-10-CM

## 2023-11-07 DIAGNOSIS — R60.9 EDEMA, UNSPECIFIED TYPE: ICD-10-CM

## 2023-11-07 LAB
CHOLEST SERPL-MCNC: 187 MG/DL
CRP SERPL QL: 12.7 MG/L
ERYTHROCYTE [SEDIMENTATION RATE] IN BLOOD: 23 MM/HOUR (ref 0–19)
HDLC SERPL-MCNC: 55 MG/DL
LDLC SERPL CALC-MCNC: 102 MG/DL (ref 0–100)
TRIGL SERPL-MCNC: 151 MG/DL

## 2023-11-07 PROCEDURE — 80061 LIPID PANEL: CPT

## 2023-11-07 PROCEDURE — 99213 OFFICE O/P EST LOW 20 MIN: CPT

## 2023-11-07 PROCEDURE — 82784 ASSAY IGA/IGD/IGG/IGM EACH: CPT

## 2023-11-07 PROCEDURE — 86231 EMA EACH IG CLASS: CPT

## 2023-11-07 PROCEDURE — 85652 RBC SED RATE AUTOMATED: CPT

## 2023-11-07 PROCEDURE — 36415 COLL VENOUS BLD VENIPUNCTURE: CPT

## 2023-11-07 PROCEDURE — 86258 DGP ANTIBODY EACH IG CLASS: CPT

## 2023-11-07 PROCEDURE — 86140 C-REACTIVE PROTEIN: CPT

## 2023-11-07 PROCEDURE — 86364 TISS TRNSGLTMNASE EA IG CLAS: CPT

## 2023-11-07 RX ORDER — PRENATAL VIT 91/IRON/FOLIC/DHA 28-975-200
COMBINATION PACKAGE (EA) ORAL 2 TIMES DAILY
Qty: 42 G | Refills: 1 | Status: SHIPPED | OUTPATIENT
Start: 2023-11-07

## 2023-11-07 NOTE — PATIENT INSTRUCTIONS
Please go for your labwork   Please schedule the additional testing as ordered     3. Cream for your feet at least twice a day   4. Cool showers   5. Thicker moisturizer all over body after your shower (Eucerin, Vaseline, Aquaphor)   6. Consider switching to frangrance free, dye-free soaps, detergents, lotions, laundry soap, shampoo   7.  Try: allegra or xyzal

## 2023-11-07 NOTE — PROGRESS NOTES
Name: Ra Mederos      : 1985      MRN: [id number]  Encounter Provider: Heidi Huerta MD  Encounter Date: 2023   Encounter department: [address]     1. Rash    2. Essential hypertension  -     Echo complete w/ contrast if indicated; Future; Expected date: 2023  -     VAS lower limb venous duplex study, complete bilateral; Future; Expected date: 2023  -     VAS BALTA & waveform analysis, multiple levels; Future; Expected date: 2023    3. Murmur, cardiac  -     Echo complete w/ contrast if indicated; Future; Expected date: 2023  -     VAS lower limb venous duplex study, complete bilateral; Future; Expected date: 2023  -     VAS BALTA & waveform analysis, multiple levels; Future; Expected date: 2023    4. Tinea pedis of both feet  -     terbinafine (LamISIL) 1 % cream; Apply topically 2 (two) times a day    5. Venous stasis  -     VAS lower limb venous duplex study, complete bilateral; Future; Expected date: 2023  -     VAS BALTA & waveform analysis, multiple levels; Future; Expected date: 2023       Ms. Ra Mederos is a 46 yo female with a PMH of migraines, HTN, recurrent rashes presenting with concerns of bilateral rash on dorsum of feet bilaterally, with associated burning pain in her feet. She also endorses intermittent myalgias, recent URI symptoms, and intermittent urticaria and diffuse itching. Physical exam reveals bilateral lower extremity patchy hyperpigmentation on dorsum of feet ankles, along with cardiac murmur not previously appreciated. Although the patient describes her rash as initially "bubbles" and has neuropathic pain, her rash is bilteral and does therefore not fit shingles. Suspect her symptoms are most lkely 2/2 athlete's foot, as this can result in skin discoloration and burning pain.      Plan:   - Lotrimin cream to feet bilaterally for fungal infection   - Echocardiogram for evaluation of murmur   - BALTA and venous duplex study to evaluate circulation    - Patient to complete labs previously ordered, including ESR, CRP, Celiac panel  - Lifestyle modifications including cool showers immediately followed by emollient moisturizer   - Patient previously referred to allergy and dermatology. Has appointment with allergy scheduled 11/14/23.   - Consider rheumatology referral for potential auto-immune etiology     Follow up in approximately 2 weeks. Subjective      HPI    Patient presents today for acute concerns of allergic reaction. Patient reports she developed a rash on her feet bilaterally about three weeks ago. The rash started as "bubble-like" lesions in between her toes, which progressed to skin hyperpigmentation. She endorses itching and burning pain. She also endorses intermittent urticaria and itching on her torso and extremities, that seems to come and go. She denies any recent changes in soap, shampoo, laundry products, or creams. She's been moisturizing after the shower and using topical steroid cream PRN. Patient does endorse some recent URI symptoms about 2 weeks ago. She reports feeling very diaphoretic when she becomes itchy. She's been taking Claritin daily, as prescribed. She denies that anyone else at home has similar symptoms, and she denies any recent outdoor barefoot exposure. She also endorses whole-body myalgias intermittently, especially in her neck, back, and buttocks. Review of Systems   Constitutional:  Positive for diaphoresis. HENT:  Positive for congestion and rhinorrhea. Eyes:  Negative for pain, discharge and redness. Respiratory:  Negative for shortness of breath. Cardiovascular:  Positive for leg swelling. Negative for chest pain. Gastrointestinal:  Negative for diarrhea, nausea and vomiting. Endocrine: Negative for cold intolerance and heat intolerance. Genitourinary:  Negative for difficulty urinating.    Musculoskeletal: Positive for myalgias. Skin:  Positive for color change and rash. Allergic/Immunologic: Positive for environmental allergies and food allergies. Neurological:  Positive for headaches. Negative for dizziness and light-headedness. Hematological:  Does not bruise/bleed easily. Psychiatric/Behavioral:  Positive for sleep disturbance. Current Outpatient Medications on File Prior to Visit   Medication Sig    Acetaminophen 325 MG CAPS Take by mouth as needed      amLODIPine (NORVASC) 10 mg tablet TAKE 1 TABLET BY MOUTH EVERY DAY    cholecalciferol (VITAMIN D3) 1,000 units tablet TAKE 1 TABLET BY MOUTH EVERY DAY    hydrOXYzine HCL (ATARAX) 25 mg tablet TAKE 1 TABLET BY MOUTH THREE TIMES A DAY    ibuprofen (MOTRIN) 600 mg tablet Take 1 tablet (600 mg total) by mouth every 6 (six) hours as needed for mild pain    loratadine (CLARITIN) 10 mg tablet Take 1 tablet (10 mg total) by mouth daily    triamcinolone (KENALOG) 0.025 % cream APPLY TOPICALLY 2 TIMES A DAY AS NEEDED FOR RASH.    triamcinolone (KENALOG) 0.1 % cream APPLY TO AFFECTED AREA 2 OR 3 TIMES DAILY AS NEEDED    Blisovi 24 Fe 1-20 MG-MCG(24) per tablet TAKE 1 TABLET BY MOUTH EVERY DAY (Patient not taking: Reported on 11/7/2023)    ergocalciferol (VITAMIN D2) 50,000 units TAKE 1 CAPSULE BY MOUTH ONE TIME PER WEEK (Patient not taking: Reported on 11/7/2023)    naproxen (Naprosyn) 500 mg tablet Take 1 tablet (500 mg total) by mouth 2 (two) times a day with meals (Patient not taking: Reported on 11/7/2023)       Objective     /70   Pulse 91   Temp 98.2 °F (36.8 °C)   Resp 18   Ht 5' 5.5" (1.664 m)   Wt 94.3 kg (208 lb)   SpO2 98%   BMI 34.09 kg/m²     Physical Exam  Vitals and nursing note reviewed. Constitutional:       General: She is not in acute distress. Appearance: She is not ill-appearing, toxic-appearing or diaphoretic. HENT:      Head: Normocephalic and atraumatic. Nose: Nose normal. No congestion or rhinorrhea. Mouth/Throat:      Mouth: Mucous membranes are moist.   Eyes:      General: No scleral icterus. Right eye: No discharge. Left eye: No discharge. Conjunctiva/sclera: Conjunctivae normal.   Cardiovascular:      Rate and Rhythm: Normal rate and regular rhythm. Heart sounds: Murmur heard. Comments: Murmur heard best at R 2nd intercostal space   Pulmonary:      Effort: Pulmonary effort is normal. No respiratory distress. Breath sounds: Normal breath sounds. No stridor. No wheezing, rhonchi or rales. Chest:      Chest wall: No tenderness. Abdominal:      General: There is no distension. Palpations: Abdomen is soft. Tenderness: There is no guarding. Musculoskeletal:      Right lower leg: Edema present. Left lower leg: Edema present. Skin:     General: Skin is warm and dry. Capillary Refill: Capillary refill takes less than 2 seconds. Findings: Lesion and rash present. Comments: Patchy areas of hyperpigmentation and scaling on dorsum of feet bilaterally as well as from ankles to mid-calf bilaterally. No lesions present between toes or on plantar surface of feet. No appreciable wounds, skin breakdown, or vesicles. Neurological:      Mental Status: She is alert. Motor: No weakness. Gait: Gait normal.   Psychiatric:         Mood and Affect: Mood normal.         Behavior: Behavior normal.         Thought Content:  Thought content normal.         Judgment: Judgment normal.                Suzanne Knowles MD   PGY-2

## 2023-11-09 LAB
ENDOMYSIUM IGA SER QL: NEGATIVE
GLIADIN PEPTIDE IGA SER-ACNC: 9 UNITS (ref 0–19)
GLIADIN PEPTIDE IGG SER-ACNC: 1 UNITS (ref 0–19)
IGA SERPL-MCNC: 249 MG/DL (ref 87–352)
TTG IGA SER-ACNC: <2 U/ML (ref 0–3)
TTG IGG SER-ACNC: <2 U/ML (ref 0–5)

## 2023-11-22 DIAGNOSIS — R21 RASH: ICD-10-CM

## 2023-11-22 DIAGNOSIS — M79.2 NERVE PAIN: ICD-10-CM

## 2023-11-22 DIAGNOSIS — R60.9 EDEMA, UNSPECIFIED TYPE: ICD-10-CM

## 2023-11-22 RX ORDER — LORATADINE 10 MG/1
10 TABLET ORAL DAILY
Qty: 90 TABLET | Refills: 1 | Status: SHIPPED | OUTPATIENT
Start: 2023-11-22 | End: 2023-11-30 | Stop reason: ALTCHOICE

## 2023-11-23 DIAGNOSIS — E55.9 VITAMIN D DEFICIENCY: ICD-10-CM

## 2023-11-24 RX ORDER — MELATONIN
Qty: 90 TABLET | Refills: 0 | Status: SHIPPED | OUTPATIENT
Start: 2023-11-24

## 2023-11-30 ENCOUNTER — OFFICE VISIT (OUTPATIENT)
Dept: FAMILY MEDICINE CLINIC | Facility: OTHER | Age: 38
End: 2023-11-30
Payer: COMMERCIAL

## 2023-11-30 VITALS
DIASTOLIC BLOOD PRESSURE: 92 MMHG | RESPIRATION RATE: 13 BRPM | HEIGHT: 66 IN | HEART RATE: 90 BPM | OXYGEN SATURATION: 100 % | WEIGHT: 208 LBS | TEMPERATURE: 98 F | BODY MASS INDEX: 33.43 KG/M2 | SYSTOLIC BLOOD PRESSURE: 126 MMHG

## 2023-11-30 DIAGNOSIS — L30.9 ECZEMA, UNSPECIFIED TYPE: ICD-10-CM

## 2023-11-30 DIAGNOSIS — T78.40XD ALLERGY, SUBSEQUENT ENCOUNTER: Primary | ICD-10-CM

## 2023-11-30 DIAGNOSIS — R21 RASH: ICD-10-CM

## 2023-11-30 PROCEDURE — 99213 OFFICE O/P EST LOW 20 MIN: CPT

## 2023-11-30 RX ORDER — FEXOFENADINE HCL 60 MG/1
60 TABLET, FILM COATED ORAL DAILY
Qty: 30 TABLET | Refills: 3 | Status: SHIPPED | OUTPATIENT
Start: 2023-11-30

## 2023-11-30 RX ORDER — TRIAMCINOLONE ACETONIDE 0.25 MG/G
CREAM TOPICAL 2 TIMES DAILY PRN
Qty: 80 G | Refills: 3 | Status: SHIPPED | OUTPATIENT
Start: 2023-11-30

## 2023-11-30 NOTE — PROGRESS NOTES
Name: Lea Lenz      : 1985      MRN: [id number]  Encounter Provider: Abby Osman MD  Encounter Date: 2023   Encounter department: [address]     1. Allergy, subsequent encounter  -     fexofenadine (ALLEGRA) 60 MG tablet; Take 1 tablet (60 mg total) by mouth daily    2. Rash  -     triamcinolone (KENALOG) 0.025 % cream; Apply topically 2 (two) times a day as needed for irritation or rash    3. Eczema, unspecified type  -     triamcinolone (KENALOG) 0.025 % cream; Apply topically 2 (two) times a day as needed for irritation or rash       Ms. Lea Lenz is a 44 yo female with a PMH of migraines, HTN, recurrent rashes presenting for follow up on rash and diffuse itching after being seen by allergist. No specific allergen has been identified, though her allergy testing was positive for several allergens. Will trial different anti-histamine, fexofenadine. Patient to follow up after being seen by dermatology and after obtaining imaging studies previously ordered. Return in approximately 3 months for follow up. Subjective      HPI  Patient presents today for follow up on rash and allergy symptoms. Patient reports she is still having diffuse itching with associated dry skin and erythema. She was seen by an allergist and was confirmed to have numerous allergies. She is still waiting to be seen by dermatology. In the mean time, she has been using cerave moisturizer immediately after the shower, taking cool showers, and avoiding allergens as best she can. She's also taking daily anti-histamine and using topical triamcinolone cream as needed. She has not yet gone for the echocardiogram or lower extremity doppler studies previously ordered. Review of Systems   Constitutional:  Negative for chills and fever. HENT:  Negative for congestion and rhinorrhea. Eyes:  Negative for discharge and redness.    Respiratory:  Negative for shortness of breath. Cardiovascular:  Negative for chest pain. Gastrointestinal:  Negative for abdominal pain, constipation, diarrhea, nausea and vomiting. Endocrine: Negative for polydipsia and polyuria. Genitourinary:  Negative for difficulty urinating. Musculoskeletal:  Negative for gait problem. Skin:  Positive for rash. Allergic/Immunologic: Positive for environmental allergies and food allergies. Neurological:  Negative for speech difficulty. Hematological:  Negative for adenopathy. Psychiatric/Behavioral:  The patient is not nervous/anxious. Current Outpatient Medications on File Prior to Visit   Medication Sig    amLODIPine (NORVASC) 10 mg tablet TAKE 1 TABLET BY MOUTH EVERY DAY    Blisovi 24 Fe 1-20 MG-MCG(24) per tablet TAKE 1 TABLET BY MOUTH EVERY DAY    cholecalciferol (VITAMIN D3) 1,000 units tablet TAKE 1 TABLET BY MOUTH EVERY DAY    Acetaminophen 325 MG CAPS Take by mouth as needed   (Patient not taking: Reported on 11/14/2023)    ibuprofen (MOTRIN) 600 mg tablet Take 1 tablet (600 mg total) by mouth every 6 (six) hours as needed for mild pain (Patient not taking: Reported on 11/14/2023)    naproxen (Naprosyn) 500 mg tablet Take 1 tablet (500 mg total) by mouth 2 (two) times a day with meals (Patient not taking: Reported on 11/7/2023)    terbinafine (LamISIL) 1 % cream Apply topically 2 (two) times a day (Patient not taking: Reported on 11/28/2023)       Objective     /92   Pulse 90   Temp 98 °F (36.7 °C)   Resp 13   Ht 5' 5.5" (1.664 m)   Wt 94.3 kg (208 lb)   SpO2 100%   BMI 34.09 kg/m²     Physical Exam  Vitals and nursing note reviewed. Constitutional:       General: She is not in acute distress. Appearance: She is not ill-appearing, toxic-appearing or diaphoretic. HENT:      Head: Normocephalic and atraumatic. Right Ear: External ear normal.      Left Ear: External ear normal.      Nose: Nose normal. No congestion or rhinorrhea. Mouth/Throat:      Mouth: Mucous membranes are moist.   Eyes:      General: No scleral icterus. Right eye: No discharge. Left eye: No discharge. Conjunctiva/sclera: Conjunctivae normal.   Cardiovascular:      Rate and Rhythm: Normal rate and regular rhythm. Heart sounds: Normal heart sounds. No murmur heard. No friction rub. No gallop. Pulmonary:      Effort: Pulmonary effort is normal. No respiratory distress. Breath sounds: Normal breath sounds. No stridor. No wheezing, rhonchi or rales. Chest:      Chest wall: No tenderness. Abdominal:      General: Bowel sounds are normal. There is no distension. Palpations: Abdomen is soft. Tenderness: There is no abdominal tenderness. There is no guarding. Musculoskeletal:      Right lower leg: Edema present. Left lower leg: Edema present. Skin:     General: Skin is warm and dry. Capillary Refill: Capillary refill takes less than 2 seconds. Coloration: Skin is not jaundiced. Findings: Erythema and rash present. Neurological:      Mental Status: She is alert. Motor: No weakness. Gait: Gait normal.   Psychiatric:         Mood and Affect: Mood normal.         Behavior: Behavior normal.         Thought Content:  Thought content normal.         Judgment: Judgment normal.          Ladon Epley, MD   PGY-2

## 2023-11-30 NOTE — PATIENT INSTRUCTIONS
- Consider buying an air purifier for at least your bedroom, If not for your home   - Starting taking allegra instead of zyrtec or claritin. - If you're ever really itchy at night time, you can also take 1 benadryl before bed. It will likely make you tired.        [facility]

## 2023-12-27 DIAGNOSIS — L30.9 ECZEMA, UNSPECIFIED TYPE: ICD-10-CM

## 2023-12-27 DIAGNOSIS — R21 RASH: ICD-10-CM

## 2023-12-27 DIAGNOSIS — T78.40XD ALLERGY, SUBSEQUENT ENCOUNTER: ICD-10-CM

## 2023-12-27 RX ORDER — TRIAMCINOLONE ACETONIDE 0.25 MG/G
CREAM TOPICAL 2 TIMES DAILY PRN
Qty: 240 G | Refills: 2 | Status: SHIPPED | OUTPATIENT
Start: 2023-12-27

## 2023-12-27 RX ORDER — FEXOFENADINE HCL 60 MG/1
60 TABLET, FILM COATED ORAL DAILY
Qty: 90 TABLET | Refills: 1 | Status: SHIPPED | OUTPATIENT
Start: 2023-12-27

## 2024-02-29 DIAGNOSIS — I10 ESSENTIAL HYPERTENSION: ICD-10-CM

## 2024-02-29 RX ORDER — AMLODIPINE BESYLATE 10 MG/1
10 TABLET ORAL DAILY
Qty: 90 TABLET | Refills: 1 | Status: SHIPPED | OUTPATIENT
Start: 2024-02-29

## 2024-03-01 DIAGNOSIS — E55.9 VITAMIN D DEFICIENCY: ICD-10-CM

## 2024-03-01 RX ORDER — MELATONIN
Qty: 90 TABLET | Refills: 0 | Status: SHIPPED | OUTPATIENT
Start: 2024-03-01

## 2024-03-21 ENCOUNTER — APPOINTMENT (EMERGENCY)
Dept: RADIOLOGY | Facility: HOSPITAL | Age: 39
End: 2024-03-21
Payer: COMMERCIAL

## 2024-03-21 ENCOUNTER — HOSPITAL ENCOUNTER (EMERGENCY)
Facility: HOSPITAL | Age: 39
Discharge: HOME | End: 2024-03-21
Attending: EMERGENCY MEDICINE
Payer: COMMERCIAL

## 2024-03-21 VITALS
DIASTOLIC BLOOD PRESSURE: 81 MMHG | RESPIRATION RATE: 18 BRPM | OXYGEN SATURATION: 98 % | HEART RATE: 95 BPM | SYSTOLIC BLOOD PRESSURE: 160 MMHG | TEMPERATURE: 97.8 F
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
## 2024-03-21 DIAGNOSIS — T14.8XXA CONTUSION: ICD-10-CM

## 2024-03-21 PROCEDURE — 73502 X-RAY EXAM HIP UNI 2-3 VIEWS: CPT

## 2024-03-21 PROCEDURE — 73090 X-RAY EXAM OF FOREARM: CPT

## 2024-03-21 PROCEDURE — 99284 EMERGENCY DEPT VISIT MOD MDM: CPT | Performed by: EMERGENCY MEDICINE

## 2024-03-21 PROCEDURE — 99284 EMERGENCY DEPT VISIT MOD MDM: CPT

## 2024-03-21 RX ORDER — ACETAMINOPHEN 325 MG/1
975 TABLET ORAL ONCE
Status: COMPLETED | OUTPATIENT
Start: 2024-03-21 | End: 2024-03-21

## 2024-03-21 RX ADMIN — ACETAMINOPHEN 975 MG: 325 TABLET, FILM COATED ORAL at 21:40

## 2024-03-21 NOTE — Clinical Note
Rosetta Gallagher was seen and treated in our emergency department on 3/21/2024.                Diagnosis:     Rosetta  .    She may return on this date: 03/25/2024    May return sooner if feeling improved.     If you have any questions or concerns, please don't hesitate to call.      Leah Shrestha MD    ______________________________           _______________          _______________  Hospital Representative                              Date                                Time
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
## 2024-07-03 DIAGNOSIS — N93.9 ABNORMAL UTERINE BLEEDING: ICD-10-CM

## 2024-07-03 DIAGNOSIS — Z01.419 ENCOUNTER FOR GYNECOLOGICAL EXAMINATION WITHOUT ABNORMAL FINDING: ICD-10-CM

## 2024-07-03 RX ORDER — NORETHINDRONE ACETATE AND ETHINYL ESTRADIOL 1MG-20(24)
1 KIT ORAL DAILY
Qty: 84 TABLET | Refills: 0 | Status: SHIPPED | OUTPATIENT
Start: 2024-07-03

## 2024-07-03 NOTE — TELEPHONE ENCOUNTER
Patient is requesting a refill of Blisovi 24 Fe 1-20 MG-MCG sent to [facility] at [address] [place] [address]

## 2024-09-16 ENCOUNTER — ANNUAL EXAM (OUTPATIENT)
Dept: OBGYN CLINIC | Facility: CLINIC | Age: 39
End: 2024-09-16
Payer: COMMERCIAL

## 2024-09-16 VITALS
HEIGHT: 66 IN | DIASTOLIC BLOOD PRESSURE: 84 MMHG | SYSTOLIC BLOOD PRESSURE: 130 MMHG | WEIGHT: 208 LBS | BODY MASS INDEX: 33.43 KG/M2

## 2024-09-16 DIAGNOSIS — Z01.419 WELL WOMAN EXAM WITH ROUTINE GYNECOLOGICAL EXAM: Primary | ICD-10-CM

## 2024-09-16 DIAGNOSIS — Z12.31 SCREENING MAMMOGRAM FOR BREAST CANCER: ICD-10-CM

## 2024-09-16 DIAGNOSIS — N92.1 BREAKTHROUGH BLEEDING ON BIRTH CONTROL PILLS: ICD-10-CM

## 2024-09-16 DIAGNOSIS — Z23 NEED FOR HPV VACCINATION: ICD-10-CM

## 2024-09-16 DIAGNOSIS — N93.9 ABNORMAL UTERINE BLEEDING: ICD-10-CM

## 2024-09-16 DIAGNOSIS — Z11.3 SCREENING FOR STD (SEXUALLY TRANSMITTED DISEASE): ICD-10-CM

## 2024-09-16 PROCEDURE — 87591 N.GONORRHOEAE DNA AMP PROB: CPT

## 2024-09-16 PROCEDURE — 99395 PREV VISIT EST AGE 18-39: CPT

## 2024-09-16 PROCEDURE — 87491 CHLMYD TRACH DNA AMP PROBE: CPT

## 2024-09-16 PROCEDURE — 90471 IMMUNIZATION ADMIN: CPT

## 2024-09-16 PROCEDURE — 90651 9VHPV VACCINE 2/3 DOSE IM: CPT

## 2024-09-16 RX ORDER — NORETHINDRONE ACETATE AND ETHINYL ESTRADIOL 1MG-20(24)
1 KIT ORAL DAILY
Qty: 84 TABLET | Refills: 4 | Status: CANCELLED | OUTPATIENT
Start: 2024-09-16

## 2024-09-16 NOTE — PROGRESS NOTES
ASSESSMENT & PLAN:   - Will increase dose of OCP to Junel 1.5/30. If no change to abnormal breakthrough bleeding, can proceed with pelvic US, and afterwards can try to switch to a different pill or different method of birth control.  - RTO in 3 months    Diagnoses and all orders for this visit:    Well woman exam with routine gynecological exam    Abnormal uterine bleeding  -     norethindrone-ethinyl estradiol-iron (Junel FE 1.5/30) 1.5-30 MG-MCG tablet; Take 1 tablet by mouth daily    Screening for STD (sexually transmitted disease)  -     Chlamydia/GC amplified DNA by PCR    Need for HPV vaccination  -     HPV Vaccine 9 valent IM    Screening mammogram for breast cancer  -     Mammo screening bilateral w 3d and cad; Future    Breakthrough bleeding on birth control pills  -     norethindrone-ethinyl estradiol-iron (Junel FE 1.5/30) 1.5-30 MG-MCG tablet; Take 1 tablet by mouth daily      The following were reviewed in today's visit: ASCCP guidelines, Gardisil vaccination, STD testing breast self exam, STD testing, use and side effects of OCPs, exercise, and healthy diet.    Patient to return to office in yearly for annual exam.     All questions have been answered to her satisfaction.    CC:  Annual Gynecologic Examination  Chief Complaint   Patient presents with    Gynecologic Exam     Annual exam, pap not indicated. Pt c/o breakthrough bleeding prior to menstrual bleeding, on Blisovi. Also, pt c/o headaches on OCP   Last pap 2022 Neg pap/ Neg hpv     Medication Refill     Blisovi- would like a yearly refill      HPI: Rosetta Gallagher is a 39 y.o.  who presents for annual gynecologic examination.  She has the following concerns:  breakthrough bleeding on OCP for the past 4-5 months. She has been having random days of spotting in the middle of her pill packs, usually unprovoked but occasionally after intercourse. Periods are usually regular otherwise occurring on placebo days and bleeding often lasts 3-4  days.   Patient would like HPV vaccine #1 today    Health Maintenance:    Exercise: infrequently  Breast exams/breast awareness: yes    Past Medical History:   Diagnosis Date    Allergic rhinitis     CTS (carpal tunnel syndrome)     Migraines     Mild depression     Last Assessed:7/27/17    Obesity     Last Assessed:7/27/17    Varicella     childhood    Vitamin D deficiency     Last Assessed:4/21/16     Past Surgical History:   Procedure Laterality Date    NO PAST SURGERIES       Past OB/Gyn History:  Period Cycle (Days): 28  Period Duration (Days): 4  Period Pattern: Regular  Menstrual Flow: Moderate  Menstrual Control: Maxi pad, Thin pad  Dysmenorrhea: (!) Moderate  Dysmenorrhea Symptoms: Cramping, HeadachePatient's last menstrual period was 08/28/2024 (within days).    Last Pap: 6/16/2022: no abnormalities  History of abnormal Pap smear: no  HPV vaccine completed: no    Patient is currently sexually active.   STD testing: yes  Current contraception: OCP (estrogen/progesterone)    Family History  Family History   Problem Relation Age of Onset    Hypertension Mother     Vitamin D deficiency Mother     Gout Father     Hypertension Father     Migraines Sister     Vitamin D deficiency Sister     Vitamin D deficiency Brother     Leukemia Brother     No Known Problems Son     No Known Problems Son     No Known Problems Maternal Grandmother     Diabetes Maternal Grandfather         type 2    Alzheimer's disease Maternal Grandfather     Alzheimer's disease Paternal Grandmother     Stroke Paternal Uncle     Breast cancer Neg Hx     Colon cancer Neg Hx     Ovarian cancer Neg Hx      Family history of uterine or ovarian cancer: no  Family history of breast cancer: no  Family history of colon cancer: no    Social History:  Social History     Socioeconomic History    Marital status: /Civil Union     Spouse name: Not on file    Number of children: 1    Years of education: Not on file    Highest education level: Not on  file   Occupational History    Occupation:    Tobacco Use    Smoking status: Never    Smokeless tobacco: Never   Vaping Use    Vaping status: Never Used   Substance and Sexual Activity    Alcohol use: Not Currently     Comment: On occasion    Drug use: Never    Sexual activity: Yes     Partners: Male     Birth control/protection: OCP     Comment: Blisovi   Other Topics Concern    Not on file   Social History Narrative    Attempting to conceive    Non-smoker     No know smoke exposure     No alcohol use     No illicit drug     Seatbelt use     Exercises sometimes     Has one child     Lives with Family              Social Determinants of Health     Financial Resource Strain: Low Risk  (5/11/2021)    Overall Financial Resource Strain (CARDIA)     Difficulty of Paying Living Expenses: Not hard at all   Food Insecurity: No Food Insecurity (5/11/2021)    Hunger Vital Sign     Worried About Running Out of Food in the Last Year: Never true     Ran Out of Food in the Last Year: Never true   Transportation Needs: No Transportation Needs (5/11/2021)    PRAPARE - Transportation     Lack of Transportation (Medical): No     Lack of Transportation (Non-Medical): No   Physical Activity: Sufficiently Active (5/11/2021)    Exercise Vital Sign     Days of Exercise per Week: 5 days     Minutes of Exercise per Session: 30 min   Stress: No Stress Concern Present (5/11/2021)    [employer] - Occupational Stress Questionnaire     Feeling of Stress : Only a little   Social Connections: Not on file   Intimate Partner Violence: Not At Risk (5/11/2021)    Humiliation, Afraid, Rape, and Kick questionnaire     Fear of Current or Ex-Partner: No     Emotionally Abused: No     Physically Abused: No     Sexually Abused: No   Housing Stability: Not on file     Domestic violence screen: negative    Allergies:  Allergies   Allergen Reactions    Iron Palpitations    Milk-Related Compounds - Food Allergy GI  "Intolerance    Other      Cats and dogs      Medications:    Current Outpatient Medications:     Acetaminophen 325 MG CAPS, Take by mouth as needed, Disp: , Rfl:     amLODIPine (NORVASC) 10 mg tablet, TAKE 1 TABLET BY MOUTH EVERY DAY, Disp: 90 tablet, Rfl: 1    cholecalciferol (VITAMIN D3) 1,000 units tablet, TAKE 1 TABLET BY MOUTH EVERY DAY, Disp: 90 tablet, Rfl: 0    fexofenadine (ALLEGRA) 60 MG tablet, TAKE 1 TABLET BY MOUTH EVERY DAY, Disp: 90 tablet, Rfl: 1    ibuprofen (MOTRIN) 600 mg tablet, Take 1 tablet (600 mg total) by mouth every 6 (six) hours as needed for mild pain, Disp: 30 tablet, Rfl: 0    norethindrone-ethinyl estradiol-iron (Junel FE 1.5/30) 1.5-30 MG-MCG tablet, Take 1 tablet by mouth daily, Disp: 84 tablet, Rfl: 1    Review of Systems:  Review of Systems   Constitutional:  Negative for chills and fever.   Respiratory:  Negative for shortness of breath.    Cardiovascular:  Negative for chest pain.   Gastrointestinal:  Negative for abdominal pain, constipation and diarrhea.   Genitourinary:  Negative for dysuria, frequency, pelvic pain, vaginal discharge and vaginal pain.   Psychiatric/Behavioral:  Negative for self-injury and suicidal ideas.      Physical Exam:  /84 (BP Location: Left arm, Patient Position: Sitting, Cuff Size: Standard)   Ht 5' 5.5\" (1.664 m)   Wt 94.3 kg (208 lb)   LMP 08/28/2024 (Within Days)   BMI 34.09 kg/m²    Physical Exam  Constitutional:       Appearance: Normal appearance.   Genitourinary:      Vulva and urethral meatus normal.      No labial fusion noted.      No vaginal erythema or tenderness.        Right Adnexa: not tender.     Left Adnexa: not tender.     No cervical discharge or friability.      Uterus is not tender.   Breasts:     Breasts are symmetrical.      Right: Normal.      Left: Normal.   HENT:      Head: Normocephalic and atraumatic.   Neck:      Thyroid: No thyroid mass or thyroid tenderness.   Cardiovascular:      Rate and Rhythm: Normal rate " and regular rhythm.      Heart sounds: Normal heart sounds. No murmur heard.  Pulmonary:      Effort: Pulmonary effort is normal.      Breath sounds: Normal breath sounds. No wheezing.   Abdominal:      Palpations: Abdomen is soft. There is no mass.      Tenderness: There is no abdominal tenderness.   Lymphadenopathy:      Cervical: No cervical adenopathy.   Neurological:      General: No focal deficit present.      Mental Status: She is alert and oriented to person, place, and time.   Skin:     General: Skin is warm and dry.   Psychiatric:         Mood and Affect: Mood normal.         Behavior: Behavior normal.   Vitals and nursing note reviewed.

## 2024-09-17 LAB
C TRACH DNA SPEC QL NAA+PROBE: NEGATIVE
N GONORRHOEA DNA SPEC QL NAA+PROBE: NEGATIVE

## 2024-09-17 RX ORDER — NORETHINDRONE ACETATE AND ETHINYL ESTRADIOL 1.5-30(21)
1 KIT ORAL DAILY
Qty: 84 TABLET | Refills: 1 | Status: SHIPPED | OUTPATIENT
Start: 2024-09-17

## 2024-09-24 ENCOUNTER — APPOINTMENT (OUTPATIENT)
Dept: LAB | Facility: CLINIC | Age: 39
End: 2024-09-24
Payer: COMMERCIAL

## 2024-09-24 DIAGNOSIS — I10 ESSENTIAL HYPERTENSION, MALIGNANT: ICD-10-CM

## 2024-09-24 LAB
ALBUMIN SERPL BCG-MCNC: 4.2 G/DL (ref 3.5–5)
ALP SERPL-CCNC: 86 U/L (ref 34–104)
ALT SERPL W P-5'-P-CCNC: 19 U/L (ref 7–52)
ANION GAP SERPL CALCULATED.3IONS-SCNC: 6 MMOL/L (ref 4–13)
AST SERPL W P-5'-P-CCNC: 18 U/L (ref 13–39)
BASOPHILS # BLD AUTO: 0.06 THOUSANDS/ΜL (ref 0–0.1)
BASOPHILS NFR BLD AUTO: 1 % (ref 0–1)
BILIRUB SERPL-MCNC: 0.47 MG/DL (ref 0.2–1)
BUN SERPL-MCNC: 9 MG/DL (ref 5–25)
CALCIUM SERPL-MCNC: 8.6 MG/DL (ref 8.4–10.2)
CHLORIDE SERPL-SCNC: 103 MMOL/L (ref 96–108)
CHOLEST SERPL-MCNC: 198 MG/DL
CO2 SERPL-SCNC: 27 MMOL/L (ref 21–32)
CREAT SERPL-MCNC: 0.57 MG/DL (ref 0.6–1.3)
EOSINOPHIL # BLD AUTO: 0.48 THOUSAND/ΜL (ref 0–0.61)
EOSINOPHIL NFR BLD AUTO: 4 % (ref 0–6)
ERYTHROCYTE [DISTWIDTH] IN BLOOD BY AUTOMATED COUNT: 12 % (ref 11.6–15.1)
GFR SERPL CREATININE-BSD FRML MDRD: 117 ML/MIN/1.73SQ M
GLUCOSE P FAST SERPL-MCNC: 94 MG/DL (ref 65–99)
HCT VFR BLD AUTO: 47 % (ref 34.8–46.1)
HDLC SERPL-MCNC: 52 MG/DL
HGB BLD-MCNC: 15.7 G/DL (ref 11.5–15.4)
IMM GRANULOCYTES # BLD AUTO: 0.13 THOUSAND/UL (ref 0–0.2)
IMM GRANULOCYTES NFR BLD AUTO: 1 % (ref 0–2)
LDLC SERPL CALC-MCNC: 106 MG/DL (ref 0–100)
LYMPHOCYTES # BLD AUTO: 2.18 THOUSANDS/ΜL (ref 0.6–4.47)
LYMPHOCYTES NFR BLD AUTO: 20 % (ref 14–44)
MCH RBC QN AUTO: 27.6 PG (ref 26.8–34.3)
MCHC RBC AUTO-ENTMCNC: 33.4 G/DL (ref 31.4–37.4)
MCV RBC AUTO: 83 FL (ref 82–98)
MONOCYTES # BLD AUTO: 0.61 THOUSAND/ΜL (ref 0.17–1.22)
MONOCYTES NFR BLD AUTO: 6 % (ref 4–12)
NEUTROPHILS # BLD AUTO: 7.6 THOUSANDS/ΜL (ref 1.85–7.62)
NEUTS SEG NFR BLD AUTO: 68 % (ref 43–75)
NONHDLC SERPL-MCNC: 146 MG/DL
NRBC BLD AUTO-RTO: 0 /100 WBCS
PLATELET # BLD AUTO: 310 THOUSANDS/UL (ref 149–390)
PMV BLD AUTO: 9.7 FL (ref 8.9–12.7)
POTASSIUM SERPL-SCNC: 3.8 MMOL/L (ref 3.5–5.3)
PROT SERPL-MCNC: 7.6 G/DL (ref 6.4–8.4)
RBC # BLD AUTO: 5.69 MILLION/UL (ref 3.81–5.12)
SODIUM SERPL-SCNC: 136 MMOL/L (ref 135–147)
TRIGL SERPL-MCNC: 198 MG/DL
TSH SERPL DL<=0.05 MIU/L-ACNC: 0.54 UIU/ML (ref 0.45–4.5)
WBC # BLD AUTO: 11.06 THOUSAND/UL (ref 4.31–10.16)

## 2024-09-24 PROCEDURE — 80061 LIPID PANEL: CPT

## 2024-09-24 PROCEDURE — 80053 COMPREHEN METABOLIC PANEL: CPT

## 2024-09-24 PROCEDURE — 85025 COMPLETE CBC W/AUTO DIFF WBC: CPT

## 2024-09-24 PROCEDURE — 84443 ASSAY THYROID STIM HORMONE: CPT

## 2024-09-24 PROCEDURE — 36415 COLL VENOUS BLD VENIPUNCTURE: CPT

## 2024-10-25 NOTE — PATIENT INSTRUCTIONS
preventive therapy for migraine headaches:  -  I will start her on propanolol 20 mg twice a day   abortive therapy for migraine headaches  -  At the onset of a migraine headache she is to take Toradol  ( ketorolac ) 10 mg  And Compazine (  prochlorperazine) 10 mg       hyper pressure was elevated today  We will see if blood pressure continues to stay elevated with propanolol    We will see patient back in 2 months stretcher

## 2024-11-18 ENCOUNTER — CLINICAL SUPPORT (OUTPATIENT)
Dept: OBGYN CLINIC | Facility: CLINIC | Age: 39
End: 2024-11-18
Payer: COMMERCIAL

## 2024-11-18 VITALS
DIASTOLIC BLOOD PRESSURE: 84 MMHG | BODY MASS INDEX: 33.43 KG/M2 | HEIGHT: 66 IN | SYSTOLIC BLOOD PRESSURE: 124 MMHG | WEIGHT: 208 LBS

## 2024-11-18 DIAGNOSIS — Z23 NEED FOR HPV VACCINATION: Primary | ICD-10-CM

## 2024-11-18 PROCEDURE — 90651 9VHPV VACCINE 2/3 DOSE IM: CPT

## 2024-11-18 PROCEDURE — 90471 IMMUNIZATION ADMIN: CPT

## 2024-12-04 ENCOUNTER — TELEPHONE (OUTPATIENT)
Age: 39
End: 2024-12-04

## 2025-03-17 ENCOUNTER — CLINICAL SUPPORT (OUTPATIENT)
Dept: OBGYN CLINIC | Facility: CLINIC | Age: 40
End: 2025-03-17
Payer: COMMERCIAL

## 2025-03-17 ENCOUNTER — APPOINTMENT (OUTPATIENT)
Dept: LAB | Facility: CLINIC | Age: 40
End: 2025-03-17
Payer: COMMERCIAL

## 2025-03-17 VITALS
SYSTOLIC BLOOD PRESSURE: 120 MMHG | BODY MASS INDEX: 32.78 KG/M2 | HEIGHT: 66 IN | DIASTOLIC BLOOD PRESSURE: 80 MMHG | WEIGHT: 204 LBS

## 2025-03-17 DIAGNOSIS — I10 ESSENTIAL HYPERTENSION, MALIGNANT: ICD-10-CM

## 2025-03-17 DIAGNOSIS — Z23 NEED FOR HPV VACCINATION: Primary | ICD-10-CM

## 2025-03-17 LAB
ALBUMIN SERPL BCG-MCNC: 3.9 G/DL (ref 3.5–5)
ALP SERPL-CCNC: 92 U/L (ref 34–104)
ALT SERPL W P-5'-P-CCNC: 31 U/L (ref 7–52)
ANION GAP SERPL CALCULATED.3IONS-SCNC: 6 MMOL/L (ref 4–13)
AST SERPL W P-5'-P-CCNC: 27 U/L (ref 13–39)
BILIRUB SERPL-MCNC: 0.41 MG/DL (ref 0.2–1)
BUN SERPL-MCNC: 10 MG/DL (ref 5–25)
CALCIUM SERPL-MCNC: 8.3 MG/DL (ref 8.4–10.2)
CHLORIDE SERPL-SCNC: 107 MMOL/L (ref 96–108)
CHOLEST SERPL-MCNC: 187 MG/DL (ref ?–200)
CO2 SERPL-SCNC: 25 MMOL/L (ref 21–32)
CREAT SERPL-MCNC: 0.58 MG/DL (ref 0.6–1.3)
GFR SERPL CREATININE-BSD FRML MDRD: 116 ML/MIN/1.73SQ M
GLUCOSE P FAST SERPL-MCNC: 98 MG/DL (ref 65–99)
HDLC SERPL-MCNC: 50 MG/DL
LDLC SERPL CALC-MCNC: 110 MG/DL (ref 0–100)
NONHDLC SERPL-MCNC: 137 MG/DL
POTASSIUM SERPL-SCNC: 3.9 MMOL/L (ref 3.5–5.3)
PROT SERPL-MCNC: 7.2 G/DL (ref 6.4–8.4)
SODIUM SERPL-SCNC: 138 MMOL/L (ref 135–147)
TRIGL SERPL-MCNC: 135 MG/DL (ref ?–150)

## 2025-03-17 PROCEDURE — 80053 COMPREHEN METABOLIC PANEL: CPT

## 2025-03-17 PROCEDURE — 90471 IMMUNIZATION ADMIN: CPT

## 2025-03-17 PROCEDURE — 36415 COLL VENOUS BLD VENIPUNCTURE: CPT

## 2025-03-17 PROCEDURE — 80061 LIPID PANEL: CPT

## 2025-03-17 PROCEDURE — 90651 9VHPV VACCINE 2/3 DOSE IM: CPT

## 2025-03-17 NOTE — PROGRESS NOTES
Patient presents to office for HPV injection given in left deltoid per patients request, patient tolerated well.